# Patient Record
Sex: FEMALE | Race: WHITE | NOT HISPANIC OR LATINO | Employment: FULL TIME | URBAN - METROPOLITAN AREA
[De-identification: names, ages, dates, MRNs, and addresses within clinical notes are randomized per-mention and may not be internally consistent; named-entity substitution may affect disease eponyms.]

---

## 2017-03-13 ENCOUNTER — TELEPHONE (OUTPATIENT)
Dept: FAMILY MEDICINE | Facility: CLINIC | Age: 62
End: 2017-03-13

## 2017-03-13 NOTE — TELEPHONE ENCOUNTER
Dr. Frazier: Dr. Lenz is away until 3-21  What is needed for this patient from me (I am covering for her)?  Ninfa Morales MD

## 2017-03-13 NOTE — TELEPHONE ENCOUNTER
Patient is scheduled for a colonoscopy for 04/12/2017 with Dr. Do, patient is noting that she is needing to have orders for Mac Sedation for her anesthesia orders are needed. Thank you

## 2017-04-05 ENCOUNTER — OFFICE VISIT (OUTPATIENT)
Dept: FAMILY MEDICINE | Facility: CLINIC | Age: 62
End: 2017-04-05
Payer: COMMERCIAL

## 2017-04-05 VITALS
HEIGHT: 66 IN | SYSTOLIC BLOOD PRESSURE: 136 MMHG | BODY MASS INDEX: 22.02 KG/M2 | WEIGHT: 137 LBS | TEMPERATURE: 98 F | OXYGEN SATURATION: 94 % | HEART RATE: 92 BPM | DIASTOLIC BLOOD PRESSURE: 88 MMHG

## 2017-04-05 DIAGNOSIS — F33.0 MILD RECURRENT MAJOR DEPRESSION (H): ICD-10-CM

## 2017-04-05 DIAGNOSIS — J45.30 MILD PERSISTENT ASTHMA WITHOUT COMPLICATION: ICD-10-CM

## 2017-04-05 DIAGNOSIS — I10 HYPERTENSION GOAL BP (BLOOD PRESSURE) < 140/90: ICD-10-CM

## 2017-04-05 DIAGNOSIS — R19.5 POSITIVE FIT (FECAL IMMUNOCHEMICAL TEST): ICD-10-CM

## 2017-04-05 DIAGNOSIS — J33.9 NASAL POLYPOSIS: ICD-10-CM

## 2017-04-05 DIAGNOSIS — Z01.818 PREOP GENERAL PHYSICAL EXAM: Primary | ICD-10-CM

## 2017-04-05 LAB — HGB BLD-MCNC: 15.2 G/DL (ref 11.7–15.7)

## 2017-04-05 PROCEDURE — 85018 HEMOGLOBIN: CPT | Performed by: FAMILY MEDICINE

## 2017-04-05 PROCEDURE — 36415 COLL VENOUS BLD VENIPUNCTURE: CPT | Performed by: FAMILY MEDICINE

## 2017-04-05 PROCEDURE — 99214 OFFICE O/P EST MOD 30 MIN: CPT | Performed by: FAMILY MEDICINE

## 2017-04-05 PROCEDURE — 93000 ELECTROCARDIOGRAM COMPLETE: CPT | Performed by: FAMILY MEDICINE

## 2017-04-05 NOTE — LETTER
My Depression Action Plan  Name: Wendy Francis   Date of Birth 1955  Date: 4/5/2017    My doctor: Rosanna Lenz   My clinic: [address]  [place] [address]  [phone]          GREEN    ZONE   Good Control    What it looks like:     Things are going generally well. You have normal up s and down s. You may even feel depressed from time to time, but bad moods usually last less than a day.   What you need to do:  1. Continue to care for yourself (see self care plan)  2. Check your depression survival kit and update it as needed  3. Follow your physician s recommendations including any medication.  4. Do not stop taking medication unless you consult with your physician first.           YELLOW         ZONE Getting Worse    What it looks like:     Depression is starting to interfere with your life.     It may be hard to get out of bed; you may be starting to isolate yourself from others.    Symptoms of depression are starting to last most all day and this has happened for several days.     You may have suicidal thoughts but they are not constant.   What you need to do:     1. Call your care team, your response to treatment will improve if you keep your care team informed of your progress. Yellow periods are signs an adjustment may need to be made.     2. Continue your self-care, even if you have to fake it!    3. Talk to someone in your support network    4. Open up your depression survival kit           RED    ZONE Medical Alert - Get Help    What it looks like:     Depression is seriously interfering with your life.     You may experience these or other symptoms: You can t get out of bed most days, can t work or engage in other necessary activities, you have trouble taking care of basic hygiene, or basic responsibilities, thoughts of suicide or death that will not go away, self-injurious behavior.     What you need to do:  1. Call your care team and  request a same-day appointment. If they are not available (weekends or after hours) call your local crisis line, emergency room or 911.      Electronically signed by: Rosanna Lenz, April 5, 2017    Depression Self Care Plan / Survival Kit    Self-Care for Depression  Here s the deal. Your body and mind are really not as separate as most people think.  What you do and think affects how you feel and how you feel influences what you do and think. This means if you do things that people who feel good do, it will help you feel better.  Sometimes this is all it takes.  There is also a place for medication and therapy depending on how severe your depression is, so be sure to consult with your medical provider and/ or Behavioral Health Consultant if your symptoms are worsening or not improving.     In order to better manage my stress, I will:    Exercise  Get some form of exercise, every day. This will help reduce pain and release endorphins, the  feel good  chemicals in your brain. This is almost as good as taking antidepressants!  This is not the same as joining a gym and then never going! (they count on that by the way ) It can be as simple as just going for a walk or doing some gardening, anything that will get you moving.      Hygiene   Maintain good hygiene (Get out of bed in the morning, Make your bed, Brush your teeth, Take a shower, and Get dressed like you were going to work, even if you are unemployed).  If your clothes don't fit try to get ones that do.    Diet  I will strive to eat foods that are good for me, drink plenty of water, and avoid excessive sugar, caffeine, alcohol, and other mood-altering substances.  Some foods that are helpful in depression are: complex carbohydrates, B vitamins, flaxseed, fish or fish oil, fresh fruits and vegetables.    Psychotherapy  I agree to participate in Individual Therapy (if recommended).    Medication  If prescribed medications, I agree to take them.  Missing doses  can result in serious side effects.  I understand that drinking alcohol, or other illicit drug use, may cause potential side effects.  I will not stop my medication abruptly without first discussing it with my provider.    Staying Connected With Others  I will stay in touch with my friends, family members, and my primary care provider/team.    Use your imagination  Be creative.  We all have a creative side; it doesn t matter if it s oil painting, sand castles, or mud pies! This will also kick up the endorphins.    Witness Beauty  (AKA stop and smell the roses) Take a look outside, even in mid-winter. Notice colors, textures. Watch the squirrels and birds.     Service to others  Be of service to others.  There is always someone else in need.  By helping others we can  get out of ourselves  and remember the really important things.  This also provides opportunities for practicing all the other parts of the program.    Humor  Laugh and be silly!  Adjust your TV habits for less news and crime-drama and more comedy.    Control your stress  Try breathing deep, massage therapy, biofeedback, and meditation. Find time to relax each day.     My support system    Clinic Contact:  Phone number:    Contact 1:  Phone number:    Contact 2:  Phone number:    Latter day/:  Phone number:    Therapist:  Phone number:    Local crisis center:    Phone number:    Other community support:  Phone number:

## 2017-04-05 NOTE — NURSING NOTE
"Chief Complaint   Patient presents with     Pre-Op Exam       Initial /88 (BP Location: Left arm, Patient Position: Chair, Cuff Size: Adult Regular)  Pulse 92  Temp 98  F (36.7  C) (Oral)  Ht 5' 6\" (1.676 m)  Wt 137 lb (62.1 kg)  SpO2 94%  BMI 22.11 kg/m2 Estimated body mass index is 22.11 kg/(m^2) as calculated from the following:    Height as of this encounter: 5' 6\" (1.676 m).    Weight as of this encounter: 137 lb (62.1 kg).  Medication Reconciliation: complete    "

## 2017-04-05 NOTE — TELEPHONE ENCOUNTER
RN spoke with Velia with  compounding pharmacy and states they are request a refill on the Budesonide 2 mg Irrigation refills.  Medication and and pharmacy teed up.  Will route to MD for the refill request.    Josse ROCHA RN, BSN

## 2017-04-05 NOTE — TELEPHONE ENCOUNTER
Reason for call: med refill   Patient called regarding (reason for call): prescription-Budesonide 2 mg irrigation  Additional comments: Please call to discuss further    Phone Number Pt can be reached at: [phone]  Best Time: anytime before 4  Can we leave a detailed message on this number? YES

## 2017-04-05 NOTE — PROGRESS NOTES
[facility]  [address] [place] [id number]  [phone]  Dept: [phone]    PRE-OP EVALUATION:  Today's date: 2017    Wendy Francis (: 1955) presents for pre-operative evaluation assessment as requested by Dr. Do.  She requires evaluation and anesthesia risk assessment prior to undergoing surgery/procedure for treatment of occult blood in stool.  Proposed procedure: Colonoscopy    Date of Surgery/ Procedure: 2017  Time of Surgery/ Procedure: 11:30 am  Hospital/Surgical Facility: [facility]  Primary Physician: Rosanna Lenz  Type of Anesthesia Anticipated: to be determined    Patient has a Health Care Directive or Living Will:  NO    Preop Questions 4/3/2017   1.  Do you have a history of heart attack, stroke, stent, bypass or surgery on an artery in the head, neck, heart or legs? No   2.  Do you ever have any pain or discomfort in your chest? No   3.  Do you have a history of  Heart Failure? No   4.   Are you troubled by shortness of breath when:  walking on a level surface, or up a slight hill, or at night? No   5.  Do you currently have a cold, bronchitis or other respiratory infection? No   6.  Do you have a cough, shortness of breath, or wheezing? No   7.  Do you sometimes get pains in the calves of your legs when you walk? No   8. Do you or anyone in your family have previous history of blood clots? No   9.  Do you or does anyone in your family have a serious bleeding problem such as prolonged bleeding following surgeries or cuts? No   10. Have you ever had problems with anemia or been told to take iron pills? No   11. Have you had any abnormal blood loss such as black, tarry or bloody stools, or abnormal vaginal bleeding? No   12. Have you ever had a blood transfusion? No   13. Have you or any of your relatives ever had problems with anesthesia? No   14. Do you have sleep apnea, excessive snoring or daytime drowsiness? No   15. Do you have any prosthetic  heart valves? No   16. Do you have prosthetic joints? No   17. Is there any chance that you may be pregnant? No     HPI:                                                    Wendy MELISSA Shantonioien is a 61 year old female who presents with positive occult blood in stool by FIT card. She denies symptoms.     She has been feeling depressed for 2 months.     See problem list for active medical problems.  Problems all longstanding and stable, except as noted/documented.  See ROS for pertinent symptoms related to these conditions.                                                                                                  .    MEDICAL HISTORY:                                                      Patient Active Problem List    Diagnosis Date Noted     Hypertension goal BP (blood pressure) < 140/90      Priority: High     Mild persistent asthma      Priority: High     Mild recurrent major depression (H)      Priority: Medium     Positive FIT (fecal immunochemical test) 12/15/2016     Priority: Medium     Nasal polyposis 02/07/2013     Priority: Medium     Dr. Zhang ENT       History of cervical cancer 09/23/2010     Priority: Medium     2000       Overactive bladder      Priority: Medium     Advanced directives, counseling/discussion 08/18/2011     Priority: Low     Advanced directive materials discussed and given to patient.         Allergic rhinitis      Priority: Low     molds, dust; claritin ineffective       CARDIOVASCULAR SCREENING; LDL GOAL LESS THAN 160 10/31/2010     Priority: Low      Past Medical History:   Diagnosis Date     Allergic rhinitis     molds, dust     Atrophic vaginitis      Cervical cancer (H) 4/10/2000    chemo and radiation, IB-II squamous cell, positive nodes     Diverticulosis of colon 6/2004     Hypertension goal BP (blood pressure) < 140/90      Mild persistent asthma      Mild recurrent major depression (H)      Nasal polyps      Overactive bladder      Sensorineural hearing losses       Tinnitus      Varicose vein of leg     No surgery     Past Surgical History:   Procedure Laterality Date     COLPOSCOPY,LOOP ELECTRD CERVIX EXCIS  2000    Radiation & chemotherapy for cervical cancer. Diagnosed in 2000     HC CORRECT BUNION,SIMPLE  over 10 years ago    ESTEPHANIE     SINUS SURGERY      x 2, Nasal polyps removed     Current Outpatient Prescriptions   Medication Sig Dispense Refill     tolterodine (DETROL LA) 4 MG 24 hr capsule Take 1 capsule (4 mg) by mouth daily 90 capsule 3     budesonide-formoterol (SYMBICORT) 80-4.5 MCG/ACT inhaler USE 2 INHALATIONS TWICE A DAY 3 Inhaler 1     COMPOUND (CMPD RX) - PHARMACY TO MIX COMPOUNDED MEDICATION Budesonide 2 mg/L irrigation solution. Rinse with 20 mls in each nostril two times a day 2000 mL 11     albuterol (PROAIR HFA, PROVENTIL HFA, VENTOLIN HFA) 108 (90 BASE) MCG/ACT inhaler Inhale 2 puffs into the lungs every 4 hours as needed for shortness of breath / dyspnea 1 Inhaler 5     cetirizine (ZYRTEC) 10 MG tablet Take 1 tablet by mouth daily as needed for allergies.       Omega-3 Fatty Acids (FISH OIL) 500 MG CAPS Take  by mouth. 1 daily         MULTI-VITAMIN OR DAILY       OTC products: None, except as noted above    Allergies   Allergen Reactions     Citalopram      Thinking changes     Flonase [Fluticasone Propionate]      Sulfa Drugs Rash      Latex Allergy: NO    Social History   Substance Use Topics     Smoking status: Never Smoker     Smokeless tobacco: Never Used     Alcohol use 1.0 oz/week      Comment: 0-2 drinks weekly     History   Drug Use No     Social History     Social History     Marital status:      Spouse name: Gerry     Number of children: 0     Years of education: 14     Occupational History     office work Other     [employer]     Social History Main Topics     Smoking status: Never Smoker     Smokeless tobacco: Never Used     Alcohol use 1.0 oz/week      Comment: 0-2 drinks weekly     Drug use: No     Sexual activity: Not Currently      Partners: Male     Birth control/ protection: Post-menopausal     Other Topics Concern      Service No     Blood Transfusions No     Caffeine Concern No     Occupational Exposure No     Hobby Hazards No     Sleep Concern No     Stress Concern Yes     marital stress, as patient is not sexually active.     Weight Concern No     Special Diet No     Back Care No     Exercise Yes     gym 3-4 x week, aerobics, weight machines.     Bike Helmet No     she will purchase one.     Seat Belt Yes     Self-Exams Yes     occasional     Parent/Sibling W/ Cabg, Mi Or Angioplasty Before 65f 55m? No     Social History Narrative    She and her  have been taking lessons for dancing.    Hobby of dancing, go to [place] to dance.          Family History   Problem Relation Age of Onset     CANCER Maternal Grandmother      pelvic     Neurologic Disorder Sister      brain aneurysm     Hypertension Father      Asthma Father      Pacemaker Father      DIABETES Maternal Grandfather      Allergies Brother      CEREBROVASCULAR DISEASE Sister      C.A.D. No family hx of       REVIEW OF SYSTEMS:                                                    C: NEGATIVE for fever, chills, change in weight  I: NEGATIVE for worrisome rashes, moles or lesions  E: NEGATIVE for vision changes or irritation  E/M: NEGATIVE for ear, mouth and throat problems  R: NEGATIVE for significant cough or SOB  B: NEGATIVE for masses, tenderness or discharge  CV: NEGATIVE for chest pain, palpitations or peripheral edema  GI: NEGATIVE for nausea, abdominal pain, heartburn, or change in bowel habits  : NEGATIVE for frequency, dysuria, or hematuria  M: NEGATIVE for significant arthralgias or myalgia  N: NEGATIVE for weakness, dizziness or paresthesias  E: NEGATIVE for temperature intolerance, skin/hair changes  H: NEGATIVE for bleeding problems  PSYCHIATRIC: depression   Complete ROS otherwise negative.     EXAM:                                                   "  /88 (BP Location: Left arm, Patient Position: Chair, Cuff Size: Adult Regular)  Pulse 92  Temp 98  F (36.7  C) (Oral)  Ht 5' 6\" (1.676 m)  Wt 137 lb (62.1 kg)  SpO2 94%  BMI 22.11 kg/m2    GENERAL APPEARANCE: healthy, alert and no distress     EYES: EOMI, PERRL     HENT: ear canals and TM's normal and nose and mouth without ulcers or lesions     NECK: no adenopathy, no asymmetry, masses, or scars and thyroid normal to palpation     RESP: lungs clear to auscultation - no rales, rhonchi or wheezes     CV: regular rates and rhythm, normal S1 S2, no S3 or S4 and no murmur, click or rub     ABDOMEN:  soft, nontender, no HSM or masses and bowel sounds normal     MS: extremities normal- no gross deformities noted, no evidence of inflammation in joints, FROM in all extremities.     SKIN: no suspicious lesions or rashes; multiple seborrheic keratoses and cherry hemangiomas; a few excoriations on back     NEURO: Normal strength and tone, sensory exam grossly normal, mentation intact and speech normal     PSYCH: mentation appears normal. and affect normal/bright     LYMPHATICS: No axillary, cervical, or supraclavicular nodes    DIAGNOSTICS:                                                    Normal EKG     IMPRESSION:                                                    Reason for surgery/procedure: occult blood in stool   Diagnosis/reason for consult: hypertension, asthma     The proposed surgical procedure is considered LOW risk.    REVISED CARDIAC RISK INDEX  The patient has the following serious cardiovascular risks for perioperative complications such as (MI, PE, VFib and 3  AV Block):  No serious cardiac risks  INTERPRETATION: 0 risks: Class I (very low risk - 0.4% complication rate)    The patient has the following additional risks for perioperative complications:  No identified additional risks      ICD-10-CM    1. Preop general physical exam Z01.818 EKG 12-lead complete w/read - Clinics     Hemoglobin     " JUST IN CASE   2. Positive FIT (fecal immunochemical test) R19.5 Hemoglobin     JUST IN CASE   3. Mild recurrent major depression (H) F33.0    4. Hypertension goal BP (blood pressure) < 140/90 I10    5. Mild persistent asthma without complication J45.30      RECOMMENDATIONS:                                                    --Patient is to hold all scheduled medications on the day of surgery    Follow-up with me about depression.     APPROVAL GIVEN to proceed with proposed procedure, without further diagnostic evaluation       Signed Electronically by: Rosanna Lenz MD    Copy of this evaluation report is provided to requesting physician.    [facility] Preop Guidelines

## 2017-04-05 NOTE — TELEPHONE ENCOUNTER
Signed Prescriptions:                        Disp   Refills    COMPOUND (CMPD RX) - PHARMACY TO MIX COMPO*2000 mL11       Sig: Budesonide 2 mg/L irrigation solution. Rinse with 20           mls in each nostril two times a day  Authorizing Provider: CASSANDRA LA

## 2017-04-05 NOTE — PATIENT INSTRUCTIONS
Before Your Surgery      Call your surgeon if there is any change in your health. This includes signs of a cold or flu (such as a sore throat, runny nose, cough, rash or fever).    Do not smoke, drink alcohol or take over the counter medicine (unless your surgeon or primary care doctor tells you to) for the 24 hours before and after surgery.    If you take prescribed drugs: Follow your doctor s orders about which medicines to take and which to stop until after surgery.    Eating and drinking prior to surgery: follow the instructions from your surgeon    Take a shower or bath the night before surgery. Use the soap your surgeon gave you to gently clean your skin. If you do not have soap from your surgeon, use your regular soap. Do not shave or scrub the surgery site.  Wear clean pajamas and have clean sheets on your bed.       [facility]    If you have any questions regarding to your visit please contact your care team:       Team Red:   Clinic Hours Telephone Number   Dr. Rosanna Newby NP   7am-7pm  Monday - Thursday   7am-5pm  Fridays  [phone]  (Appointment scheduling available 24/7)    Questions about your visit?   Team Line  [phone]   Urgent Care - [place] - 11am-9pm Monday-Friday Saturday-Sunday- 9am-5pm   [place] - 5pm-9pm Monday-Friday Saturday-Sunday- 9am-5pm  [phone] - [place]   [phone] - [place]       What options do I have for visits at the clinic other than the traditional office visit?  To expand how we care for you, many of our providers are utilizing electronic visits (e-visits) and telephone visits, when medically appropriate, for interactions with their patients rather than a visit in the clinic.   We also offer nurse visits for many medical concerns. Just like any other service, we will bill your insurance company for this type of visit based on time spent on the phone with  your provider. Not all insurance companies cover these visits. Please check with your medical insurance if this type of visit is covered. You will be responsible for any charges that are not paid by your insurance.      E-visits via [employer]:  generally incur a $35.00 fee.  Telephone visits:  Time spent on the phone: *charged based on time that is spent on the phone in increments of 10 minutes. Estimated cost:   5-10 mins $30.00   11-20 mins. $59.00   21-30 mins. $85.00     Use [employer] (secure email communication and access to your chart) to send your primary care provider a message or make an appointment. Ask someone on your Team how to sign up for [employer].  For a Price Quote for your services, please call our Consumer Price Line at [phone].      As always, Thank you for trusting us with your health care needs!      Discharge TULIO Olsen  CMA

## 2017-04-05 NOTE — MR AVS SNAPSHOT
After Visit Summary   4/5/2017    Wendy Francis    MRN: [id number]           Patient Information     Date Of Birth          1955        Visit Information        Provider Department      4/5/2017 7:00 AM Rosanna Lenz MD [facility]        Today's Diagnoses     Preop general physical exam    -  1    Positive FIT (fecal immunochemical test)        Mild recurrent major depression (H)        Hypertension goal BP (blood pressure) < 140/90        Mild persistent asthma without complication          Care Instructions      Before Your Surgery      Call your surgeon if there is any change in your health. This includes signs of a cold or flu (such as a sore throat, runny nose, cough, rash or fever).    Do not smoke, drink alcohol or take over the counter medicine (unless your surgeon or primary care doctor tells you to) for the 24 hours before and after surgery.    If you take prescribed drugs: Follow your doctor s orders about which medicines to take and which to stop until after surgery.    Eating and drinking prior to surgery: follow the instructions from your surgeon    Take a shower or bath the night before surgery. Use the soap your surgeon gave you to gently clean your skin. If you do not have soap from your surgeon, use your regular soap. Do not shave or scrub the surgery site.  Wear clean pajamas and have clean sheets on your bed.       [facility]    If you have any questions regarding to your visit please contact your care team:       Team Red:   Clinic Hours Telephone Number   Dr. Rosanna Newby, NP   7am-7pm  Monday - Thursday   7am-5pm  Fridays  [phone]  (Appointment scheduling available 24/7)    Questions about your visit?   Team Line  [phone]   Urgent Care - [place] and [facility] [place] - 11am-9pm Monday-Friday Saturday-Sunday- 9am-5pm   [place] - 5pm-9pm Monday-Friday Saturday-Sunday-  9am-5pm  [phone] - [place]   [phone] - [place]       What options do I have for visits at the clinic other than the traditional office visit?  To expand how we care for you, many of our providers are utilizing electronic visits (e-visits) and telephone visits, when medically appropriate, for interactions with their patients rather than a visit in the clinic.   We also offer nurse visits for many medical concerns. Just like any other service, we will bill your insurance company for this type of visit based on time spent on the phone with your provider. Not all insurance companies cover these visits. Please check with your medical insurance if this type of visit is covered. You will be responsible for any charges that are not paid by your insurance.      E-visits via Sawer[employer]:  generally incur a $35.00 fee.  Telephone visits:  Time spent on the phone: *charged based on time that is spent on the phone in increments of 10 minutes. Estimated cost:   5-10 mins $30.00   11-20 mins. $59.00   21-30 mins. $85.00     Use Sawerly (secure email communication and access to your chart) to send your primary care provider a message or make an appointment. Ask someone on your Team how to sign up for [employer]er[employer].  For a Price Quote for your services, please call our Consumer Price Line at [phone].      As always, Thank you for trusting us with your health care needs!      Discharge TULIO Olsen  [facility]          Follow-ups after your visit        Follow-up notes from your care team     Return in about 6 months (around 10/5/2017) for physical, asthma, depression.      Your next 10 appointments already scheduled     Apr 12, 2017   Procedure with Anatoly Do MD   [facility] (--)    [address]  [facility] [address] [phone]              Who to contact     If you have questions or need follow up information about today's clinic visit or your schedule please contact [facility] directly at [phone].  Normal or non-critical lab and imaging results will be communicated to you by MyChart, letter or phone within 4 business days after the clinic has received the results. If you do not hear from us within 7 days, please contact the clinic through [employer] or phone. If you have a critical or abnormal lab result, we will notify you by phone as soon as possible.  Submit refill requests through [employer] or call your pharmacy and they will forward the refill request to us. Please allow 3 business days for your refill to be completed.          Additional Information About Your Visit        [employer] Information     [employer] gives you secure access to your electronic health record. If you see a primary care provider, you can also send messages to your care team and make appointments. If you have questions, please call your primary care clinic.  If you do not have a primary care provider, please call [phone] and they will assist you.        Care EveryWhere ID     This is your Care EveryWhere ID. This could be used by other organizations to access your [facility] medical records  [id number]        Your Vitals Were     Pulse Temperature Height Pulse Oximetry BMI (Body Mass Index)       92 98  F (36.7  C) (Oral) 5' 6\" (1.676 m) 94% 22.11 kg/m2        Blood Pressure from Last 3 Encounters:   04/05/17 136/88   11/10/16 (!) 154/100   11/07/16 140/90    Weight from Last 3 Encounters:   04/05/17 137 lb (62.1 kg)   01/09/17 135 lb (61.2 kg)   11/10/16 134 lb 9.6 oz (61.1 kg)              We Performed the Following     EKG 12-lead complete w/read - Clinics     Hemoglobin     JUST IN CASE        Primary Care Provider Office Phone # Fax #    Rosanna Lenz [phone]       [facility] [address] [facility] [address]        Thank you!     Thank you for choosing [facility]  for your care. Our goal is always to provide you with excellent care. " Hearing back from our patients is one way we can continue to improve our services. Please take a few minutes to complete the written survey that you may receive in the mail after your visit with us. Thank you!             Your Updated Medication List - Protect others around you: Learn how to safely use, store and throw away your medicines at www.disposemymeds.org.          This list is accurate as of: 4/5/17  7:44 AM.  Always use your most recent med list.                   Brand Name Dispense Instructions for use    albuterol 108 (90 BASE) MCG/ACT Inhaler    PROAIR HFA/PROVENTIL HFA/VENTOLIN HFA    1 Inhaler    Inhale 2 puffs into the lungs every 4 hours as needed for shortness of breath / dyspnea       budesonide-formoterol 80-4.5 MCG/ACT Inhaler    SYMBICORT    3 Inhaler    USE 2 INHALATIONS TWICE A DAY       cetirizine 10 MG tablet    zyrTEC     Take 1 tablet by mouth daily as needed for allergies.       COMPOUND - PHARMACY TO MIX COMPOUNDED MEDICATION    CMPD RX    2000 mL    Budesonide 2 mg/L irrigation solution. Rinse with 20 mls in each nostril two times a day       Fish Oil 500 MG Caps      Take  by mouth. 1 daily       MULTI-VITAMIN PO      DAILY       tolterodine 4 MG 24 hr capsule    DETROL LA    90 capsule    Take 1 capsule (4 mg) by mouth daily

## 2017-04-11 ENCOUNTER — TELEPHONE (OUTPATIENT)
Dept: FAMILY MEDICINE | Facility: CLINIC | Age: 62
End: 2017-04-11

## 2017-04-11 NOTE — TELEPHONE ENCOUNTER
Called and left message to call red team.    Rae Huff CMA ([facility])

## 2017-04-11 NOTE — TELEPHONE ENCOUNTER
Ele was in to see Dr. Lenz on 04-05-17 and has the diagnosis of Depression and was due for a PHQ-9, please complete.

## 2017-04-12 ENCOUNTER — HOSPITAL ENCOUNTER (OUTPATIENT)
Facility: AMBULATORY SURGERY CENTER | Age: 62
Discharge: HOME | End: 2017-04-12
Attending: SURGERY | Admitting: SURGERY
Payer: COMMERCIAL

## 2017-04-12 ENCOUNTER — SURGERY (OUTPATIENT)
Age: 62
End: 2017-04-12

## 2017-04-12 ENCOUNTER — ANESTHESIA (OUTPATIENT)
Dept: SURGERY | Facility: AMBULATORY SURGERY CENTER | Age: 62
End: 2017-04-12

## 2017-04-12 ENCOUNTER — ANESTHESIA EVENT (OUTPATIENT)
Dept: SURGERY | Facility: AMBULATORY SURGERY CENTER | Age: 62
End: 2017-04-12

## 2017-04-12 VITALS — RESPIRATION RATE: 17 BRPM

## 2017-04-12 VITALS
OXYGEN SATURATION: 96 % | RESPIRATION RATE: 18 BRPM | TEMPERATURE: 97.4 F | SYSTOLIC BLOOD PRESSURE: 153 MMHG | DIASTOLIC BLOOD PRESSURE: 78 MMHG

## 2017-04-12 DIAGNOSIS — R19.5 HEME POSITIVE STOOL: Primary | ICD-10-CM

## 2017-04-12 LAB — COLONOSCOPY: NORMAL

## 2017-04-12 PROCEDURE — G8918 PT W/O PREOP ORDER IV AB PRO: HCPCS

## 2017-04-12 PROCEDURE — 45378 DIAGNOSTIC COLONOSCOPY: CPT | Mod: 74

## 2017-04-12 PROCEDURE — 45378 DIAGNOSTIC COLONOSCOPY: CPT | Mod: 53 | Performed by: SURGERY

## 2017-04-12 PROCEDURE — G8907 PT DOC NO EVENTS ON DISCHARG: HCPCS

## 2017-04-12 RX ORDER — NALOXONE HYDROCHLORIDE 0.4 MG/ML
.1-.4 INJECTION, SOLUTION INTRAMUSCULAR; INTRAVENOUS; SUBCUTANEOUS
Status: DISCONTINUED | OUTPATIENT
Start: 2017-04-12 | End: 2017-04-13 | Stop reason: HOSPADM

## 2017-04-12 RX ORDER — FENTANYL CITRATE 50 UG/ML
25-50 INJECTION, SOLUTION INTRAMUSCULAR; INTRAVENOUS
Status: DISCONTINUED | OUTPATIENT
Start: 2017-04-12 | End: 2017-04-13 | Stop reason: HOSPADM

## 2017-04-12 RX ORDER — ONDANSETRON 4 MG/1
4 TABLET, ORALLY DISINTEGRATING ORAL EVERY 30 MIN PRN
Status: DISCONTINUED | OUTPATIENT
Start: 2017-04-12 | End: 2017-04-13 | Stop reason: HOSPADM

## 2017-04-12 RX ORDER — PROPOFOL 10 MG/ML
INJECTION, EMULSION INTRAVENOUS PRN
Status: DISCONTINUED | OUTPATIENT
Start: 2017-04-12 | End: 2017-04-12

## 2017-04-12 RX ORDER — ONDANSETRON 2 MG/ML
4 INJECTION INTRAMUSCULAR; INTRAVENOUS EVERY 30 MIN PRN
Status: DISCONTINUED | OUTPATIENT
Start: 2017-04-12 | End: 2017-04-13 | Stop reason: HOSPADM

## 2017-04-12 RX ORDER — SODIUM CHLORIDE, SODIUM LACTATE, POTASSIUM CHLORIDE, CALCIUM CHLORIDE 600; 310; 30; 20 MG/100ML; MG/100ML; MG/100ML; MG/100ML
INJECTION, SOLUTION INTRAVENOUS CONTINUOUS
Status: DISCONTINUED | OUTPATIENT
Start: 2017-04-12 | End: 2017-04-13 | Stop reason: HOSPADM

## 2017-04-12 RX ORDER — LIDOCAINE 40 MG/G
CREAM TOPICAL
Status: DISCONTINUED | OUTPATIENT
Start: 2017-04-12 | End: 2017-04-13 | Stop reason: HOSPADM

## 2017-04-12 RX ORDER — LIDOCAINE HYDROCHLORIDE 20 MG/ML
INJECTION, SOLUTION INFILTRATION; PERINEURAL PRN
Status: DISCONTINUED | OUTPATIENT
Start: 2017-04-12 | End: 2017-04-12

## 2017-04-12 RX ORDER — ONDANSETRON 2 MG/ML
INJECTION INTRAMUSCULAR; INTRAVENOUS PRN
Status: DISCONTINUED | OUTPATIENT
Start: 2017-04-12 | End: 2017-04-12

## 2017-04-12 RX ORDER — PROPOFOL 10 MG/ML
INJECTION, EMULSION INTRAVENOUS CONTINUOUS PRN
Status: DISCONTINUED | OUTPATIENT
Start: 2017-04-12 | End: 2017-04-12

## 2017-04-12 RX ADMIN — PROPOFOL 50 MG: 10 INJECTION, EMULSION INTRAVENOUS at 11:54

## 2017-04-12 RX ADMIN — SODIUM CHLORIDE, SODIUM LACTATE, POTASSIUM CHLORIDE, CALCIUM CHLORIDE: 600; 310; 30; 20 INJECTION, SOLUTION INTRAVENOUS at 10:46

## 2017-04-12 RX ADMIN — ONDANSETRON 4 MG: 2 INJECTION INTRAMUSCULAR; INTRAVENOUS at 11:59

## 2017-04-12 RX ADMIN — PROPOFOL 50 MG: 10 INJECTION, EMULSION INTRAVENOUS at 11:53

## 2017-04-12 RX ADMIN — SODIUM CHLORIDE, SODIUM LACTATE, POTASSIUM CHLORIDE, CALCIUM CHLORIDE: 600; 310; 30; 20 INJECTION, SOLUTION INTRAVENOUS at 11:47

## 2017-04-12 RX ADMIN — PROPOFOL 100 MCG/KG/MIN: 10 INJECTION, EMULSION INTRAVENOUS at 11:51

## 2017-04-12 RX ADMIN — LIDOCAINE HYDROCHLORIDE 40 MG: 20 INJECTION, SOLUTION INFILTRATION; PERINEURAL at 11:51

## 2017-04-12 NOTE — ANESTHESIA CARE TRANSFER NOTE
Patient: Wendy Francis    Procedure(s):  colonoscopy with MAC - Wound Class: II-Clean Contaminated    Diagnosis: screening  Diagnosis Additional Information: No value filed.    Anesthesia Type:   MAC     Note:    Patient transferred to:Phase II        Vitals: (Last set prior to Anesthesia Care Transfer)    CRNA VITALS  4/12/2017 1136 - 4/12/2017 1208      4/12/2017             Pulse: 99    SpO2: 97 %                Electronically Signed By: CAMILO Hayden CRNA  April 12, 2017  12:08 PM

## 2017-04-12 NOTE — ANESTHESIA PREPROCEDURE EVALUATION
Anesthesia Evaluation     . Pt has had prior anesthetic. Type: General    No history of anesthetic complications          ROS/MED HX    ENT/Pulmonary:     (+)Mild Persistent asthma Treatment: Inhaler daily,  , . .    Neurologic:  - neg neurologic ROS     Cardiovascular:     (+) hypertension----. : . . . :. .       METS/Exercise Tolerance:     Hematologic:  - neg hematologic  ROS       Musculoskeletal:  - neg musculoskeletal ROS       GI/Hepatic:  - neg GI/hepatic ROS       Renal/Genitourinary:     (+) Other Renal/ Genitourinary, Overactive bladder      Endo:  - neg endo ROS       Psychiatric:     (+) psychiatric history depression and anxiety      Infectious Disease:  - neg infectious disease ROS       Malignancy:   (+) Malignancy History of Other  Other CA Cervical Remission status post         Other:    - neg other ROS                 Physical Exam  Normal systems: cardiovascular, pulmonary and dental    Airway   Mallampati: I  TM distance: >3 FB  Neck ROM: full    Dental     Cardiovascular       Pulmonary    breath sounds clear to auscultation                    Anesthesia Plan      History & Physical Review  History and physical reviewed and following examination; no interval change.    ASA Status:  3 .    NPO Status:  > 6 hours    Plan for MAC with Intravenous and Propofol induction. Maintenance will be TIVA.  Reason for MAC:  Extreme anxiety (QS)  PONV prophylaxis:  Ondansetron (or other 5HT-3)       Postoperative Care  Postoperative pain management:  Multi-modal analgesia.      Consents  Anesthetic plan, risks, benefits and alternatives discussed with:  Patient and Spouse..                          .

## 2017-04-12 NOTE — IP AVS SNAPSHOT
MRN:[id number]                      After Visit Summary   4/12/2017    Wendy Francis    MRN: [id number]           Thank you!     Thank you for choosing [facility] for your care. Our goal is always to provide you with excellent care. Hearing back from our patients is one way we can continue to improve our services. Please take a few minutes to complete the written survey that you may receive in the mail after you visit with us. Thank you!        Patient Information     Date Of Birth          1955        About your hospital stay     You were admitted on:  April 12, 2017 You last received care in the:  [facility]    You were discharged on:  April 12, 2017       Who to Call     For medical emergencies, please call 911.  For non-urgent questions about your medical care, please call your primary care provider or clinic, [phone]  For questions related to your surgery, please call your surgery clinic        Attending Provider     Provider Specialty    Anatoly Do MD Surgery       Primary Care Provider Office Phone # Fax #    Rosanna Lenz [phone]       [address] [address]        Your next 10 appointments already scheduled     Apr 13, 2017  9:00 AM CDT   XR COLON AIR CONTRAST with MGXR1, MG NM RAD   [facility]    [address] [place] [address] [phone]           Your exam will take about one hour. If you think you might be pregnant, tell your doctor before your exam.  Your bowel (insides) must be empty to get a clear picture. Follow these guidelines:   The night before your exam:Take 10 ounces magnesium citrate at 6 p.m. the night before your exam.   Take 3 Dulcolax tablets at 8 p.m. Swallow the tablets whole, do not chew or crush them. Do not take within 1 hour of antacids.   1 Dulcolax rectal suppository. Take this the morning  of your exam if your stools are not clear by this time. If your stools are clear, you don t need to take this. Do not use a suppository if you are having an air contrast colon.   Follow a  non-residue diet  for at least 48 hours. This means no fruits, vegetables, nuts, seeds or whole grains.   Do not eat, chew gum or smoke for 8 hours before your exam.   Keep drinking clear liquids until 2 hours before your exam. Clear liquids include water, clear juice, black coffee or clear tea without milk, Gatorade, clear soda.   Please bring a list of your current medicines to your exam. (Include vitamins, minerals and over-the-counter medicines.) Leave your valuables at home.  Please call the Imaging Department at your exam site with any questions.              Future tests that were ordered for you     XR Colon Air contrast       Only able to get to 40 cm on colonoscopy                  Further instructions from your care team        I did notice that your anus muscle was weak.    Do you, or anyone you know, have any trouble with leaking of stool?  With passing gas or sudden urge to have a bowel movement?  Or not being aware that you are passing stool with no warning or while you are asleep?  If so we have a questionnaire to have you fill out and then follow up with me in the clinic to discuss a great new option that has a 90 % success rate and you get to see if it works for you first before having any permanent procedure done.    Call [phone] get an appointment with me.   There is hope.  Thanks  Anatoly Do MD      Pending Results     No orders found from 4/10/2017 to 4/13/2017.            Admission Information     Date & Time Provider Department Dept. Phone    4/12/2017 Anatoly Do MD [facility] [phone]      Your Vitals Were     Blood Pressure Temperature Respirations Pulse Oximetry          132/68 97.4  F (36.3  C) (Temporal) 16 96%        MyChart Information     MyChart  gives you secure access to your electronic health record. If you see a primary care provider, you can also send messages to your care team and make appointments. If you have questions, please call your primary care clinic.  If you do not have a primary care provider, please call [phone] and they will assist you.        Care EveryWhere ID     This is your Care EveryWhere ID. This could be used by other organizations to access your [facility] medical records  [id number]           Review of your medicines      CONTINUE these medicines which have NOT CHANGED        Dose / Directions    albuterol 108 (90 BASE) MCG/ACT Inhaler   Commonly known as:  PROAIR HFA/PROVENTIL HFA/VENTOLIN HFA   Used for:  Mild persistent asthma without complication        Dose:  2 puff   Inhale 2 puffs into the lungs every 4 hours as needed for shortness of breath / dyspnea   Quantity:  1 Inhaler   Refills:  5       budesonide-formoterol 80-4.5 MCG/ACT Inhaler   Commonly known as:  SYMBICORT   Used for:  Mild persistent asthma without complication        USE 2 INHALATIONS TWICE A DAY   Quantity:  3 Inhaler   Refills:  1       cetirizine 10 MG tablet   Commonly known as:  zyrTEC        Dose:  10 mg   Take 1 tablet by mouth daily as needed for allergies.   Refills:  0       COMPOUND - PHARMACY TO MIX COMPOUNDED MEDICATION   Commonly known as:  CMPD RX   Used for:  Nasal polyposis        Budesonide 2 mg/L irrigation solution. Rinse with 20 mls in each nostril two times a day   Quantity:  2000 mL   Refills:  11       Fish Oil 500 MG Caps        Take  by mouth. 1 daily   Refills:  0       MULTI-VITAMIN PO        DAILY   Refills:  0       tolterodine 4 MG 24 hr capsule   Commonly known as:  DETROL LA   Used for:  Overactive bladder        Dose:  4 mg   Take 1 capsule (4 mg) by mouth daily   Quantity:  90 capsule   Refills:  3                Protect others around you: Learn how to safely use, store and throw away your medicines at  www.disposemymeds.org.             Medication List: This is a list of all your medications and when to take them. Check marks below indicate your daily home schedule. Keep this list as a reference.      Medications           Morning Afternoon Evening Bedtime As Needed    albuterol 108 (90 BASE) MCG/ACT Inhaler   Commonly known as:  PROAIR HFA/PROVENTIL HFA/VENTOLIN HFA   Inhale 2 puffs into the lungs every 4 hours as needed for shortness of breath / dyspnea                                budesonide-formoterol 80-4.5 MCG/ACT Inhaler   Commonly known as:  SYMBICORT   USE 2 INHALATIONS TWICE A DAY                                cetirizine 10 MG tablet   Commonly known as:  zyrTEC   Take 1 tablet by mouth daily as needed for allergies.                                COMPOUND - PHARMACY TO MIX COMPOUNDED MEDICATION   Commonly known as:  CMPD RX   Budesonide 2 mg/L irrigation solution. Rinse with 20 mls in each nostril two times a day                                Fish Oil 500 MG Caps   Take  by mouth. 1 daily                                MULTI-VITAMIN PO   DAILY                                tolterodine 4 MG 24 hr capsule   Commonly known as:  DETROL LA   Take 1 capsule (4 mg) by mouth daily

## 2017-04-12 NOTE — DISCHARGE INSTRUCTIONS
I did notice that your anus muscle was weak.    Do you, or anyone you know, have any trouble with leaking of stool?  With passing gas or sudden urge to have a bowel movement?  Or not being aware that you are passing stool with no warning or while you are asleep?  If so we have a questionnaire to have you fill out and then follow up with me in the clinic to discuss a great new option that has a 90 % success rate and you get to see if it works for you first before having any permanent procedure done.    Call [phone] get an appointment with me.   There is hope.  Thanks  Anatoly Do MD

## 2017-04-12 NOTE — ANESTHESIA POSTPROCEDURE EVALUATION
Patient: Wendy Francis    Procedure(s):  colonoscopy with MAC - Wound Class: II-Clean Contaminated    Diagnosis:screening  Diagnosis Additional Information: No value filed.    Anesthesia Type:  MAC    Note:  Anesthesia Post Evaluation    Patient location during evaluation: PACU  Patient participation: Able to fully participate in evaluation  Level of consciousness: awake  Pain management: adequate  Airway patency: patent  Cardiovascular status: acceptable  Respiratory status: acceptable  Hydration status: acceptable  PONV: none     Anesthetic complications: None    Comments: Excellent recovery noted.        Last vitals:  Vitals:    04/12/17 1038 04/12/17 1205   BP: 154/75 132/68   Resp: 18 16   Temp: 97.4  F (36.3  C)    SpO2: 95% 96%         Electronically Signed By: Adolfo Randall MD  April 12, 2017  12:14 PM

## 2017-04-12 NOTE — IP AVS SNAPSHOT
[facility]    [address] [place] [address]    Phone:  [phone]                                       After Visit Summary   4/12/2017    Wendy Francis    MRN: [id number]           After Visit Summary Signature Page     I have received my discharge instructions, and my questions have been answered. I have discussed any challenges I see with this plan with the nurse or doctor.    ..........................................................................................................................................  Patient/Patient Representative Signature      ..........................................................................................................................................  Patient Representative Print Name and Relationship to Patient    ..................................................               ................................................  Date                                            Time    ..........................................................................................................................................  Reviewed by Signature/Title    ...................................................              ..............................................  Date                                                            Time

## 2017-04-13 ENCOUNTER — RADIANT APPOINTMENT (OUTPATIENT)
Dept: GENERAL RADIOLOGY | Facility: CLINIC | Age: 62
End: 2017-04-13
Attending: SURGERY
Payer: COMMERCIAL

## 2017-04-13 DIAGNOSIS — R19.5 HEME POSITIVE STOOL: ICD-10-CM

## 2017-04-13 PROCEDURE — 74270 X-RAY XM COLON 1CNTRST STD: CPT

## 2017-04-14 NOTE — TELEPHONE ENCOUNTER
Spoke to patient let her know that Dr Lenz want patient to do PHQ 9 on my chart, send to patient    Jodie Olsen. MA

## 2017-05-05 ENCOUNTER — MYC MEDICAL ADVICE (OUTPATIENT)
Dept: FAMILY MEDICINE | Facility: CLINIC | Age: 62
End: 2017-05-05

## 2017-05-05 DIAGNOSIS — J45.30 MILD PERSISTENT ASTHMA WITHOUT COMPLICATION: ICD-10-CM

## 2017-05-05 RX ORDER — ALBUTEROL SULFATE 90 UG/1
2 AEROSOL, METERED RESPIRATORY (INHALATION) EVERY 4 HOURS PRN
Qty: 1 INHALER | Refills: 4 | Status: SHIPPED | OUTPATIENT
Start: 2017-05-05 | End: 2019-06-30

## 2017-05-05 NOTE — TELEPHONE ENCOUNTER
albuterol (PROAIR HFA, PROVENTIL HFA, VENTOLIN HFA) 108 (90 BASE) MCG/ACT inhaler  Last Written Prescription Date: 11/7/2016  Last Fill Quantity: 1, # refills: 5    Last Office Visit with [facility], P or [facility] prescribing provider:  4/5/2017   Future Office Visit:       Date of Last Asthma Action Plan Letter:   Asthma Action Plan Q1 Year    Topic Date Due     Asthma Action Plan - yearly  11/07/2017      Asthma Control Test:   ACT Total Scores 11/7/2016   ACT TOTAL SCORE (Goal Greater than or Equal to 20) 22   In the past 12 months, how many times did you visit the emergency room for your asthma without being admitted to the hospital? 0   In the past 12 months, how many times were you hospitalized overnight because of your asthma? 0       Date of Last Spirometry Test:   No results found for this or any previous visit.

## 2017-05-05 NOTE — TELEPHONE ENCOUNTER
Prescription approved per [facility] Refill Protocol.    Signed Prescriptions:                        Disp   Refills    albuterol (PROAIR HFA/PROVENTIL HFA/VENTOL*1 Inha*4        Sig: Inhale 2 puffs into the lungs every 4 hours as needed           for shortness of breath / dyspnea  Authorizing Provider: CASSANDRA LA  Ordering User: MAYRA REDMOND, RN, BSN

## 2017-05-06 ENCOUNTER — TRANSFERRED RECORDS (OUTPATIENT)
Dept: HEALTH INFORMATION MANAGEMENT | Facility: CLINIC | Age: 62
End: 2017-05-06

## 2017-05-11 NOTE — PROGRESS NOTES
SUBJECTIVE:                                                    Wendy Francis is a 62 year old female who presents to clinic today for the following health issues:    Hospital Follow-up Visit:    Hospital/Nursing Home/IP Rehab Facility: [facility]  Date of Admission: 5/6/17  Date of Discharge: 5/8/17  Reason(s) for Admission: Asthma exacerbation             Problems taking medications regularly:  None       Medication changes since discharge: None       Problems adhering to non-medication therapy:  None    Summary of hospitalization:  CareEverywhere information obtained and reviewed  Diagnostic Tests/Treatments reviewed.  Follow up needed: none  Other Healthcare Providers Involved in Patient s Care:         [facility]  Update since discharge: improved.      Post Discharge Medication Reconciliation: discharge medications reconciled, continue medications without change.  Plan of care communicated with patient     Coding guidelines for this visit:  Type of Medical   Decision Making Face-to-Face Visit       within 7 Days of discharge Face-to-Face Visit        within 14 days of discharge   Moderate Complexity [id number]   High Complexity [id number]          Wendy Francis is a 62 year old female who presents with hospital follow-up of asthma exacerbation. Symptom onset of acute shortness of breath has been sudden, improving for a time period of 9 days. Severity is described as mild. Course of her symptoms over time is improving.    I have reviewed the patient's medical history in detail and updated the computerized patient record.     ROS:  C: NEGATIVE for fever, chills, change in weight  I: NEGATIVE for worrisome rashes, moles or lesions  E: NEGATIVE for vision changes or irritation  E/M: NEGATIVE for ear, mouth and throat problems  RESP:as above  CV: NEGATIVE for chest pain, palpitations or peripheral edema    OBJECTIVE:                                                    /80  Pulse 88  Temp 97.8  " F (36.6  C)  Resp 16  Ht 5' 6\" (1.676 m)  Wt 135 lb (61.2 kg)  SpO2 97%  BMI 21.79 kg/m2  Body mass index is 21.79 kg/(m^2).  GENERAL: healthy, alert and no distress  EYES: Eyes grossly normal to inspection, PERRL and conjunctivae and sclerae normal  HENT: ear canals and TM's normal, nose and mouth without ulcers or lesions  NECK: no adenopathy, no asymmetry, masses, or scars and thyroid normal to palpation  RESP: lungs clear to auscultation - no rales, rhonchi or wheezes  CV: regular rate and rhythm, normal S1 S2, no S3 or S4, no murmur, click or rub, no peripheral edema and peripheral pulses strong  MS: no gross musculoskeletal defects noted, no edema  PSYCH: mentation appears normal, affect normal/bright    Diagnostic Test Results:  none      ASSESSMENT/PLAN:                                                    (J45.30) Mild persistent asthma without complication  (primary encounter diagnosis)  Comment: improved   Plan: budesonide-formoterol (SYMBICORT) 80-4.5         MCG/ACT Inhaler        Follow-up ACT in 1 month     (I10) Hypertension goal BP (blood pressure) < 140/90  Comment: Well controlled with medications without side effects.     See Patient Instructions    Rosanna Lenz MD  [facility]    "

## 2017-05-11 NOTE — PATIENT INSTRUCTIONS
[facility]    If you have any questions regarding to your visit please contact your care team:       Team Red:   Clinic Hours Telephone Number   Dr. Rosanna Newby, NP   7am-7pm  Monday - Thursday   7am-5pm  Fridays  [phone]  (Appointment scheduling available 24/7)    Questions about your visit?   Team Line  [phone]   [facility] [place] [facility] [facility] 11am-9pm Monday-Friday Saturday-Sunday- 9am-5pm   [place] - 5pm-9pm Monday-Friday Saturday-Sunday- 9am-5pm  [phone] - [place]   [phone] - [place]       What options do I have for visits at the clinic other than the traditional office visit?  To expand how we care for you, many of our providers are utilizing electronic visits (e-visits) and telephone visits, when medically appropriate, for interactions with their patients rather than a visit in the clinic.   We also offer nurse visits for many medical concerns. Just like any other service, we will bill your insurance company for this type of visit based on time spent on the phone with your provider. Not all insurance companies cover these visits. Please check with your medical insurance if this type of visit is covered. You will be responsible for any charges that are not paid by your insurance.      E-visits via PokitDok:  generally incur a $35.00 fee.  Telephone visits:  Time spent on the phone: *charged based on time that is spent on the phone in increments of 10 minutes. Estimated cost:   5-10 mins $30.00   11-20 mins. $59.00   21-30 mins. $85.00     Use [employer] (secure email communication and access to your chart) to send your primary care provider a message or make an appointment. Ask someone on your Team how to sign up for PokitDok.  For a Price Quote for your services, please call our Consumer Price Line at [phone].      As always, Thank you for trusting us with your health care  needs!      Discharge TULIO Olsen  CMA

## 2017-05-15 ENCOUNTER — OFFICE VISIT (OUTPATIENT)
Dept: FAMILY MEDICINE | Facility: CLINIC | Age: 62
End: 2017-05-15
Payer: COMMERCIAL

## 2017-05-15 VITALS
TEMPERATURE: 97.8 F | HEIGHT: 66 IN | DIASTOLIC BLOOD PRESSURE: 80 MMHG | OXYGEN SATURATION: 97 % | RESPIRATION RATE: 16 BRPM | BODY MASS INDEX: 21.69 KG/M2 | WEIGHT: 135 LBS | HEART RATE: 88 BPM | SYSTOLIC BLOOD PRESSURE: 122 MMHG

## 2017-05-15 DIAGNOSIS — I10 HYPERTENSION GOAL BP (BLOOD PRESSURE) < 140/90: ICD-10-CM

## 2017-05-15 DIAGNOSIS — J45.30 MILD PERSISTENT ASTHMA WITHOUT COMPLICATION: Primary | ICD-10-CM

## 2017-05-15 PROCEDURE — 99214 OFFICE O/P EST MOD 30 MIN: CPT | Performed by: FAMILY MEDICINE

## 2017-05-15 RX ORDER — BUDESONIDE AND FORMOTEROL FUMARATE DIHYDRATE 80; 4.5 UG/1; UG/1
AEROSOL RESPIRATORY (INHALATION)
Qty: 3 INHALER | Refills: 1 | Status: SHIPPED | OUTPATIENT
Start: 2017-05-15 | End: 2018-01-01

## 2017-05-15 ASSESSMENT — PATIENT HEALTH QUESTIONNAIRE - PHQ9: 5. POOR APPETITE OR OVEREATING: NOT AT ALL

## 2017-05-15 ASSESSMENT — ANXIETY QUESTIONNAIRES

## 2017-05-15 NOTE — MR AVS SNAPSHOT
After Visit Summary   5/15/2017    Wendy Francis    MRN: [id number]           Patient Information     Date Of Birth          1955        Visit Information        Provider Department      5/15/2017 2:20 PM Rosanna Lenz MD [facility]        Today's Diagnoses     Mild persistent asthma without complication    -  1    Hypertension goal BP (blood pressure) < 140/90          Care Instructions    [facility]    If you have any questions regarding to your visit please contact your care team:       Team Red:   Clinic Hours Telephone Number   Dr. Rosanna Newby NP   7am-7pm  Monday - Thursday   7am-5pm  Fridays  [phone]  (Appointment scheduling available 24/7)    Questions about your visit?   Team Line  [phone]   Urgent Care - [place] - 11am-9pm Monday-Friday Saturday-Sunday- 9am-5pm   [place] - 5pm-9pm Monday-Friday Saturday-Sunday- 9am-5pm  [phone] - [facility]  [phone] - [place]       What options do I have for visits at the clinic other than the traditional office visit?  To expand how we care for you, many of our providers are utilizing electronic visits (e-visits) and telephone visits, when medically appropriate, for interactions with their patients rather than a visit in the clinic.   We also offer nurse visits for many medical concerns. Just like any other service, we will bill your insurance company for this type of visit based on time spent on the phone with your provider. Not all insurance companies cover these visits. Please check with your medical insurance if this type of visit is covered. You will be responsible for any charges that are not paid by your insurance.      E-visits via iPosition:  generally incur a $35.00 fee.  Telephone visits:  Time spent on the phone: *charged based on time that is spent on the phone in increments of 10 minutes. Estimated  cost:   5-10 mins $30.00   11-20 mins. $59.00   21-30 mins. $85.00     Use [employer]khart (secure email communication and access to your chart) to send your primary care provider a message or make an appointment. Ask someone on your Team how to sign up for [employer].  For a Price Quote for your services, please call our [employer] Line at [phone].      As always, Thank you for trusting us with your health care needs!      Discharge TULIO Olsen CMA          Follow-ups after your visit        Follow-up notes from your care team     Return in about 6 months (around 11/15/2017), or if symptoms worsen or fail to improve, for physical, asthma, depression.      Who to contact     If you have questions or need follow up information about today's clinic visit or your schedule please contact [facility] directly at [phone].  Normal or non-critical lab and imaging results will be communicated to you by MyChart, letter or phone within 4 business days after the clinic has received the results. If you do not hear from us within 7 days, please contact the clinic through StandDeskhart or phone. If you have a critical or abnormal lab result, we will notify you by phone as soon as possible.  Submit refill requests through [employer]etera or call your pharmacy and they will forward the refill request to us. Please allow 3 business days for your refill to be completed.          Additional Information About Your Visit        Stand[employer]khart Information     [employer] gives you secure access to your electronic health record. If you see a primary care provider, you can also send messages to your care team and make appointments. If you have questions, please call your primary care clinic.  If you do not have a primary care provider, please call [phone] and they will assist you.        Care EveryWhere ID     This is your Care EveryWhere ID. This could be used by other organizations to access your [facility]  "records  [id number]        Your Vitals Were     Pulse Temperature Respirations Height Pulse Oximetry BMI (Body Mass Index)    88 97.8  F (36.6  C) 16 5' 6\" (1.676 m) 97% 21.79 kg/m2       Blood Pressure from Last 3 Encounters:   05/15/17 122/80   04/12/17 153/78   04/05/17 136/88    Weight from Last 3 Encounters:   05/15/17 135 lb (61.2 kg)   04/05/17 137 lb (62.1 kg)   01/09/17 135 lb (61.2 kg)              We Performed the Following     DEPRESSION ACTION PLAN (DAP)          Where to get your medicines      These medications were sent to [email or web address] - [address] [address]     Phone:  [phone]     budesonide-formoterol 80-4.5 MCG/ACT Inhaler          Primary Care Provider Office Phone # Fax #    Rosanna Lenz [phone]       [address] [address]        Thank you!     Thank you for choosing [facility]  for your care. Our goal is always to provide you with excellent care. Hearing back from our patients is one way we can continue to improve our services. Please take a few minutes to complete the written survey that you may receive in the mail after your visit with us. Thank you!             Your Updated Medication List - Protect others around you: Learn how to safely use, store and throw away your medicines at www.disposemymeds.org.          This list is accurate as of: 5/15/17  2:29 PM.  Always use your most recent med list.                   Brand Name Dispense Instructions for use    albuterol 108 (90 BASE) MCG/ACT Inhaler    PROAIR HFA/PROVENTIL HFA/VENTOLIN HFA    1 Inhaler    Inhale 2 puffs into the lungs every 4 hours as needed for shortness of breath / dyspnea       budesonide-formoterol 80-4.5 MCG/ACT Inhaler    SYMBICORT    3 Inhaler    USE 2 INHALATIONS TWICE A DAY       cetirizine 10 MG tablet    zyrTEC     Take 1 tablet by mouth daily as needed " for allergies.       COMPOUND - PHARMACY TO MIX COMPOUNDED MEDICATION    CMPD RX    2000 mL    Budesonide 2 mg/L irrigation solution. Rinse with 20 mls in each nostril two times a day       Fish Oil 500 MG Caps      Take  by mouth. 1 daily       MULTI-VITAMIN PO      DAILY       tolterodine 4 MG 24 hr capsule    DETROL LA    90 capsule    Take 1 capsule (4 mg) by mouth daily

## 2017-05-15 NOTE — NURSING NOTE
"Chief Complaint   Patient presents with     Hospital F/U       Initial /80  Pulse 88  Temp 97.8  F (36.6  C)  Resp 16  Ht 5' 6\" (1.676 m)  Wt 135 lb (61.2 kg)  SpO2 97%  BMI 21.79 kg/m2 Estimated body mass index is 21.79 kg/(m^2) as calculated from the following:    Height as of this encounter: 5' 6\" (1.676 m).    Weight as of this encounter: 135 lb (61.2 kg).  Medication Reconciliation: complete     Jodie Olsen. MA      "

## 2017-05-16 ASSESSMENT — ASTHMA QUESTIONNAIRES: ACT_TOTALSCORE: 18

## 2017-05-16 ASSESSMENT — ANXIETY QUESTIONNAIRES: GAD7 TOTAL SCORE: 2

## 2017-05-16 ASSESSMENT — PATIENT HEALTH QUESTIONNAIRE - PHQ9: SUM OF ALL RESPONSES TO PHQ QUESTIONS 1-9: 5

## 2017-05-24 DIAGNOSIS — N32.81 OVERACTIVE BLADDER: ICD-10-CM

## 2017-05-24 NOTE — TELEPHONE ENCOUNTER
tolterodine (DETROL LA) 4 MG 24 hr capsule      Last Written Prescription Date: 11/7/16  Last Fill Quantity: 90,  # refills: 3   Last Office Visit with [facility], [facility] or [facility] prescribing provider: 5/15/17

## 2017-05-26 RX ORDER — TOLTERODINE 4 MG/1
CAPSULE, EXTENDED RELEASE ORAL
Qty: 90 CAPSULE | Refills: 1 | Status: SHIPPED | OUTPATIENT
Start: 2017-05-26 | End: 2017-07-25

## 2017-05-26 NOTE — TELEPHONE ENCOUNTER
Prescription approved per [facility] Ref[facility] Protocol.    Signed Prescriptions:                        Disp   Refills    tolterodine (DETROL LA) 4 MG 24 hr capsule 90 cap*1        Sig: TAKE 1 CAPSULE DAILY  Authorizing Provider: CASSANDRA LA  Ordering User: MAYRA REDMOND, RN, BSN

## 2017-07-20 NOTE — PROGRESS NOTES
"  SUBJECTIVE:                                                    Wendy Francis is a 62 year old female who presents to clinic today for the following health issues:    Diarrhea      Duration: 6 weeks    Description:       Consistency of stool: loose       Blood in stool: YES- just when she wipes       Number of loose stools past 24 hours: 8-10    Intensity:  Moderate, not getting any better    Accompanying signs and symptoms:       Fever: no        Nausea/vomitting: YES - once       Abdominal pain: no        Weight loss: no     History (recent antibiotics or travel/ill contacts/med changes/testing done): colonoscopy with follow-up barium enema 2017     Precipitating or alleviating factors: None    Therapies tried and outcome: Imodium AD gives no relief    ROS:  C: NEGATIVE for fever, chills, change in weight  ENT/MOUTH: allergies   RESP: mild persistent asthma   GI: diarrhea    I have reviewed the patient's medical history in detail and updated the computerized patient record.     OBJECTIVE:     /88 (BP Location: Left arm, Patient Position: Chair, Cuff Size: Adult Regular)  Pulse 87  Temp 98.6  F (37  C) (Oral)  Ht 5' 6\" (1.676 m)  Wt 135 lb (61.2 kg)  SpO2 95%  BMI 21.79 kg/m2  Body mass index is 21.79 kg/(m^2).  GENERAL: healthy, alert and no distress  EYES: Eyes grossly normal to inspection, PERRL and conjunctivae and sclerae normal  HENT: ear canals and TM's normal, nose and mouth without ulcers or lesions  NECK: no adenopathy, no asymmetry, masses, or scars and thyroid normal to palpation  RESP: lungs clear to auscultation - no rales, rhonchi or wheezes  CV: regular rate and rhythm, normal S1 S2, no S3 or S4, no murmur   ABDOMEN: soft, nontender, no hepatosplenomegaly, no masses and bowel sounds normal  MS: no gross musculoskeletal defects noted, no edema  PSYCH: mentation appears normal, affect normal/bright    Diagnostic Test Results:  Results for orders placed or performed in visit on 04/13/17 "   XR Colon Barium    Narrative    Barium enema    Comparisons: None.    HISTORY: Heme positive stool. Incomplete colonoscopy.    FINDINGS: There are multiple surgical clips in the pelvis on the   view. Single column barium enema was performed. No bladder mass or  polyp is identified. No mucosal abnormality is identified. There is  reflux of barium into the small bowel.      Impression    IMPRESSION: Negative barium enema.    REMINGTON ALANIZ MD       ASSESSMENT/PLAN:   (R19.7) Diarrhea, unspecified type  (primary encounter diagnosis)  Plan: Enteric Bacteria and Virus Panel by MARY Stool,         Clostridium difficile toxin B PCR,         diphenoxylate-atropine (LOMOTIL) 2.5-0.025 MG         per tablet        Discussed risks and benefits of this medication. No further refills until follow-up appointment. Call or return to clinic as needed if these symptoms worsen or fail to improve as anticipated.     (J45.30) Mild persistent asthma without complication  Comment: controlled with medications without side effects.         See Patient Instructions    Rosanna Lenz MD  [facility]

## 2017-07-20 NOTE — PATIENT INSTRUCTIONS
[facility]    If you have any questions regarding to your visit please contact your care team:       Team Red:   Clinic Hours Telephone Number   Dr. Rosanna Newby, NP   7am-7pm  Monday - Thursday   7am-5pm  Fridays  [phone]  (Appointment scheduling available 24/7)    Questions about your visit?   Team Line  [phone]   Urgent Care - [place] - 11am-9pm Monday-Friday Saturday-Sunday- 9am-5pm   [place] - 5pm-9pm Monday-Friday Saturday-Sunday- 9am-5pm  [phone] - [place]   [phone] - [place]       What options do I have for visits at the clinic other than the traditional office visit?  To expand how we care for you, many of our providers are utilizing electronic visits (e-visits) and telephone visits, when medically appropriate, for interactions with their patients rather than a visit in the clinic.   We also offer nurse visits for many medical concerns. Just like any other service, we will bill your insurance company for this type of visit based on time spent on the phone with your provider. Not all insurance companies cover these visits. Please check with your medical insurance if this type of visit is covered. You will be responsible for any charges that are not paid by your insurance.      E-visits via AtHoc:  generally incur a $35.00 fee.  Telephone visits:  Time spent on the phone: *charged based on time that is spent on the phone in increments of 10 minutes. Estimated cost:   5-10 mins $30.00   11-20 mins. $59.00   21-30 mins. $85.00     Use [employer]ict (secure email communication and access to your chart) to send your primary care provider a message or make an appointment. Ask someone on your Team how to sign up for AtHoc.  For a Price Quote for your services, please call our Consumer Price Line at [phone].      As always, Thank you for trusting us with your health care needs!  Roxie EMANUEL  MA

## 2017-07-25 ENCOUNTER — OFFICE VISIT (OUTPATIENT)
Dept: FAMILY MEDICINE | Facility: CLINIC | Age: 62
End: 2017-07-25
Payer: COMMERCIAL

## 2017-07-25 VITALS
BODY MASS INDEX: 21.69 KG/M2 | HEART RATE: 87 BPM | HEIGHT: 66 IN | SYSTOLIC BLOOD PRESSURE: 136 MMHG | OXYGEN SATURATION: 95 % | TEMPERATURE: 98.6 F | DIASTOLIC BLOOD PRESSURE: 88 MMHG | WEIGHT: 135 LBS

## 2017-07-25 DIAGNOSIS — J45.30 MILD PERSISTENT ASTHMA WITHOUT COMPLICATION: ICD-10-CM

## 2017-07-25 DIAGNOSIS — R19.7 DIARRHEA, UNSPECIFIED TYPE: Primary | ICD-10-CM

## 2017-07-25 PROCEDURE — 99214 OFFICE O/P EST MOD 30 MIN: CPT | Performed by: FAMILY MEDICINE

## 2017-07-25 RX ORDER — DIPHENOXYLATE HCL/ATROPINE 2.5-.025MG
2 TABLET ORAL 4 TIMES DAILY PRN
Qty: 30 TABLET | Refills: 0 | Status: SHIPPED | OUTPATIENT
Start: 2017-07-25 | End: 2018-08-06

## 2017-07-25 NOTE — MR AVS SNAPSHOT
After Visit Summary   7/25/2017    Wendy Francis    MRN: [id number]           Patient Information     Date Of Birth          1955        Visit Information        Provider Department      7/25/2017 6:20 PM Rosanna Lenz MD [facility]        Today's Diagnoses     Diarrhea, unspecified type    -  1    Mild persistent asthma without complication          Care Instructions    [facility]    If you have any questions regarding to your visit please contact your care team:       Team Red:   Clinic Hours Telephone Number   Dr. Rosanna Newby NP   7am-7pm  Monday - Thursday   7am-5pm  Fridays  [phone]  (Appointment scheduling available 24/7)    Questions about your visit?   Team Line  [phone]   Urgent Care - [place] [facility] - 11am-9pm Monday-Friday Saturday-Sunday- 9am-5pm   [place] - 5pm-9pm Monday-Friday Saturday-Sunday- 9am-5pm  [phone] - [place]   [phone] - [place]       What options do I have for visits at the clinic other than the traditional office visit?  To expand how we care for you, many of our providers are utilizing electronic visits (e-visits) and telephone visits, when medically appropriate, for interactions with their patients rather than a visit in the clinic.   We also offer nurse visits for many medical concerns. Just like any other service, we will bill your insurance company for this type of visit based on time spent on the phone with your provider. Not all insurance companies cover these visits. Please check with your medical insurance if this type of visit is covered. You will be responsible for any charges that are not paid by your insurance.      E-visits via [employer]:  generally incur a $35.00 fee.  Telephone visits:  Time spent on the phone: *charged based on time that is spent on the phone in increments of 10 minutes. Estimated cost:   5-10 mins  $30.00   11-20 mins. $59.00   21-30 mins. $85.00     Use Exacasterhart (secure email communication and access to your chart) to send your primary care provider a message or make an appointment. Ask someone on your Team how to sign up for Lucky Pai.  For a Price Quote for your services, please call our ASSET4 Line at [phone].      As always, Thank you for trusting us with your health care needs!  Roxie EMANUEL MA            Follow-ups after your visit        Follow-up notes from your care team     Return in about 6 months (around 1/25/2018), or if symptoms worsen or fail to improve, for physical, asthma.      Future tests that were ordered for you today     Open Future Orders        Priority Expected Expires Ordered    Enteric Bacteria and Virus Panel by MARY Stool Routine 7/25/2017 7/25/2018 7/25/2017    Clostridium difficile toxin B PCR Routine 7/25/2017 8/24/2017 7/25/2017            Who to contact     If you have questions or need follow up information about today's clinic visit or your schedule please contact [facility] directly at [phone].  Normal or non-critical lab and imaging results will be communicated to you by Exacasterhart, letter or phone within 4 business days after the clinic has received the results. If you do not hear from us within 7 days, please contact the clinic through Exacasterhart or phone. If you have a critical or abnormal lab result, we will notify you by phone as soon as possible.  Submit refill requests through Lucky Pai or call your pharmacy and they will forward the refill request to us. Please allow 3 business days for your refill to be completed.          Additional Information About Your Visit        Exacasterhart Information     [employer] gives you secure access to your electronic health record. If you see a primary care provider, you can also send messages to your care team and make appointments. If you have questions, please call your primary care clinic.  If you do not have a  "primary care provider, please call [phone] and they will assist you.        Care EveryWhere ID     This is your Care EveryWhere ID. This could be used by other organizations to access your [facility] medical records  [id number]        Your Vitals Were     Pulse Temperature Height Pulse Oximetry BMI (Body Mass Index)       87 98.6  F (37  C) (Oral) 5' 6\" (1.676 m) 95% 21.79 kg/m2        Blood Pressure from Last 3 Encounters:   07/25/17 136/88   05/15/17 122/80   04/12/17 153/78    Weight from Last 3 Encounters:   07/25/17 135 lb (61.2 kg)   05/15/17 135 lb (61.2 kg)   04/05/17 137 lb (62.1 kg)                 Today's Medication Changes          These changes are accurate as of: 7/25/17  6:51 PM.  If you have any questions, ask your nurse or doctor.               Start taking these medicines.        Dose/Directions    diphenoxylate-atropine 2.5-0.025 MG per tablet   Commonly known as:  LOMOTIL   Used for:  Diarrhea, unspecified type   Started by:  Rosanna Lenz MD        Dose:  2 tablet   Take 2 tablets by mouth 4 times daily as needed for diarrhea . No further refills until follow-up appointment   Quantity:  30 tablet   Refills:  0            Where to get your medicines      Some of these will need a paper prescription and others can be bought over the counter.  Ask your nurse if you have questions.     Bring a paper prescription for each of these medications     diphenoxylate-atropine 2.5-0.025 MG per tablet                Primary Care Provider Office Phone # Fax #    Rosanna Lenz [phone]       [address] [address]        Equal Access to Services     [facility]: Shayla Neumann, wajenny lumonroe, qayumi kaalmada tesfaye, rajwinder joy. So [facility] [phone].    ATENCIÓN: Si habla español, tiene a wolff disposición servicios gratuitos de asistencia lingüística. Llame al [phone].    We comply with " applicable federal civil rights laws and [place] laws. We do not discriminate on the basis of race, color, national origin, age, disability sex, sexual orientation or gender identity.            Thank you!     Thank you for choosing [facility]  for your care. Our goal is always to provide you with excellent care. Hearing back from our patients is one way we can continue to improve our services. Please take a few minutes to complete the written survey that you may receive in the mail after your visit with us. Thank you!             Your Updated Medication List - Protect others around you: Learn how to safely use, store and throw away your medicines at www.disposemymeds.org.          This list is accurate as of: 7/25/17  6:51 PM.  Always use your most recent med list.                   Brand Name Dispense Instructions for use Diagnosis    albuterol 108 (90 BASE) MCG/ACT Inhaler    PROAIR HFA/PROVENTIL HFA/VENTOLIN HFA    1 Inhaler    Inhale 2 puffs into the lungs every 4 hours as needed for shortness of breath / dyspnea    Mild persistent asthma without complication       budesonide-formoterol 80-4.5 MCG/ACT Inhaler    SYMBICORT    3 Inhaler    USE 2 INHALATIONS TWICE A DAY    Mild persistent asthma without complication       cetirizine 10 MG tablet    zyrTEC     Take 1 tablet by mouth daily as needed for allergies.        COMPOUNDED NON-CONTROLLED SUBSTANCE - PHARMACY TO MIX COMPOUNDED MEDICATION    CMPD RX    2000 mL    Budesonide 2 mg/L irrigation solution. Rinse with 20 mls in each nostril two times a day    Nasal polyposis       diphenoxylate-atropine 2.5-0.025 MG per tablet    LOMOTIL    30 tablet    Take 2 tablets by mouth 4 times daily as needed for diarrhea . No further refills until follow-up appointment    Diarrhea, unspecified type       Fish Oil 500 MG Caps      Take  by mouth. 1 daily        MULTI-VITAMIN PO      DAILY        tolterodine 4 MG 24 hr capsule    DETROL LA    90 capsule     Take 1 capsule (4 mg) by mouth daily    Overactive bladder

## 2017-07-25 NOTE — NURSING NOTE
"Chief Complaint   Patient presents with     Diarrhea       Initial /88 (BP Location: Left arm, Patient Position: Chair, Cuff Size: Adult Regular)  Pulse 87  Temp 98.6  F (37  C) (Oral)  Ht 5' 6\" (1.676 m)  Wt 135 lb (61.2 kg)  SpO2 95%  BMI 21.79 kg/m2 Estimated body mass index is 21.79 kg/(m^2) as calculated from the following:    Height as of this encounter: 5' 6\" (1.676 m).    Weight as of this encounter: 135 lb (61.2 kg).  Medication Reconciliation: complete    "

## 2017-07-26 DIAGNOSIS — R19.7 DIARRHEA, UNSPECIFIED TYPE: ICD-10-CM

## 2017-07-26 PROCEDURE — 87493 C DIFF AMPLIFIED PROBE: CPT | Performed by: FAMILY MEDICINE

## 2017-07-26 PROCEDURE — 87506 IADNA-DNA/RNA PROBE TQ 6-11: CPT | Performed by: FAMILY MEDICINE

## 2017-07-26 ASSESSMENT — ASTHMA QUESTIONNAIRES: ACT_TOTALSCORE: 23

## 2017-07-27 LAB
C DIFF TOX B STL QL: NORMAL
SPECIMEN SOURCE: NORMAL

## 2017-07-27 NOTE — PROGRESS NOTES
Your results are normal.  Your final test results are pending.  Please check your chart again within 3 to 5 days. You will receive further instruction when your full test result panel is complete.    Rosanna Lenz MD

## 2017-07-28 LAB
CAMPYLOBACTER GROUP BY NAT: NOT DETECTED
ENTERIC PATHOGEN COMMENT: NORMAL
NOROVIRUS I AND II BY NAT: NOT DETECTED
ROTAVIRUS A BY NAT: NOT DETECTED
SALMONELLA SPECIES BY NAT: NOT DETECTED
SHIGA TOXIN 1 GENE BY NAT: NOT DETECTED
SHIGA TOXIN 2 GENE BY NAT: NOT DETECTED
SHIGELLA SP+EIEC IPAH STL QL NAA+PROBE: NOT DETECTED
VIBRIO GROUP BY NAT: NOT DETECTED
YERSINIA ENTEROCOLITICA BY NAT: NOT DETECTED

## 2017-07-28 NOTE — PROGRESS NOTES
Your results are normal. Call or return to clinic as needed if these symptoms worsen or fail to improve as anticipated.     Rosanna Lenz MD

## 2017-08-04 ENCOUNTER — MYC MEDICAL ADVICE (OUTPATIENT)
Dept: FAMILY MEDICINE | Facility: CLINIC | Age: 62
End: 2017-08-04

## 2017-08-04 NOTE — TELEPHONE ENCOUNTER
Pt was seen for diarrhea issues on 7/25/17.  Please review the mychart message below and advise.    Josse ROCHA, RN, BSN

## 2017-08-07 ENCOUNTER — MYC MEDICAL ADVICE (OUTPATIENT)
Dept: FAMILY MEDICINE | Facility: CLINIC | Age: 62
End: 2017-08-07

## 2017-08-07 DIAGNOSIS — R19.7 DIARRHEA, UNSPECIFIED TYPE: Primary | ICD-10-CM

## 2017-08-18 ENCOUNTER — TRANSFERRED RECORDS (OUTPATIENT)
Dept: HEALTH INFORMATION MANAGEMENT | Facility: CLINIC | Age: 62
End: 2017-08-18

## 2017-08-21 ENCOUNTER — TRANSFERRED RECORDS (OUTPATIENT)
Dept: HEALTH INFORMATION MANAGEMENT | Facility: CLINIC | Age: 62
End: 2017-08-21

## 2017-10-06 ENCOUNTER — TRANSFERRED RECORDS (OUTPATIENT)
Dept: HEALTH INFORMATION MANAGEMENT | Facility: CLINIC | Age: 62
End: 2017-10-06

## 2018-01-01 DIAGNOSIS — J45.30 MILD PERSISTENT ASTHMA WITHOUT COMPLICATION: ICD-10-CM

## 2018-01-05 RX ORDER — DILTIAZEM HYDROCHLORIDE 60 MG/1
TABLET, FILM COATED ORAL
Qty: 30.6 G | Refills: 1 | Status: SHIPPED | OUTPATIENT
Start: 2018-01-05 | End: 2018-07-01

## 2018-01-05 NOTE — TELEPHONE ENCOUNTER
Spoke with [facility]. No prior auth. Needed. Patient will have a 60.00 dollar copay.  Roxie EMANUEL MA

## 2018-01-05 NOTE — TELEPHONE ENCOUNTER
Prescription approved per [facility] Refill Protocol.  Niurka Rowley, RN - BC

## 2018-01-06 ENCOUNTER — OFFICE VISIT (OUTPATIENT)
Dept: URGENT CARE | Facility: URGENT CARE | Age: 63
End: 2018-01-06
Payer: COMMERCIAL

## 2018-01-06 VITALS
BODY MASS INDEX: 20.98 KG/M2 | OXYGEN SATURATION: 91 % | WEIGHT: 130 LBS | HEART RATE: 133 BPM | TEMPERATURE: 99.5 F | SYSTOLIC BLOOD PRESSURE: 152 MMHG | DIASTOLIC BLOOD PRESSURE: 81 MMHG

## 2018-01-06 DIAGNOSIS — J06.9 VIRAL URI WITH COUGH: ICD-10-CM

## 2018-01-06 DIAGNOSIS — J45.21 MILD INTERMITTENT ASTHMA WITH EXACERBATION: Primary | ICD-10-CM

## 2018-01-06 PROCEDURE — 94640 AIRWAY INHALATION TREATMENT: CPT | Performed by: PHYSICIAN ASSISTANT

## 2018-01-06 PROCEDURE — 99214 OFFICE O/P EST MOD 30 MIN: CPT | Mod: 25 | Performed by: PHYSICIAN ASSISTANT

## 2018-01-06 RX ORDER — IPRATROPIUM BROMIDE AND ALBUTEROL SULFATE 2.5; .5 MG/3ML; MG/3ML
1 SOLUTION RESPIRATORY (INHALATION) ONCE
Qty: 3 ML | Refills: 0
Start: 2018-01-06 | End: 2019-01-09

## 2018-01-06 RX ORDER — IPRATROPIUM BROMIDE AND ALBUTEROL SULFATE 2.5; .5 MG/3ML; MG/3ML
1 SOLUTION RESPIRATORY (INHALATION) EVERY 6 HOURS PRN
Qty: 30 VIAL | Refills: 0 | Status: SHIPPED | OUTPATIENT
Start: 2018-01-06 | End: 2019-06-30

## 2018-01-06 RX ORDER — PREDNISONE 20 MG/1
40 TABLET ORAL DAILY
Qty: 10 TABLET | Refills: 0 | Status: SHIPPED | OUTPATIENT
Start: 2018-01-06 | End: 2018-01-11

## 2018-01-06 ASSESSMENT — ENCOUNTER SYMPTOMS
PSYCHIATRIC NEGATIVE: 1
CARDIOVASCULAR NEGATIVE: 1
GASTROINTESTINAL NEGATIVE: 1
EYES NEGATIVE: 1
NEUROLOGICAL NEGATIVE: 1
MUSCULOSKELETAL NEGATIVE: 1

## 2018-01-06 NOTE — NURSING NOTE
"Chief Complaint   Patient presents with     Cough     Patient complains of cough and wheezing       Initial /81 (BP Location: Left arm, Patient Position: Chair, Cuff Size: Adult Regular)  Pulse 133  Temp 99.5  F (37.5  C) (Oral)  Wt 130 lb (59 kg)  SpO2 91%  BMI 20.98 kg/m2 Estimated body mass index is 20.98 kg/(m^2) as calculated from the following:    Height as of 7/25/17: 5' 6\" (1.676 m).    Weight as of this encounter: 130 lb (59 kg).  Medication Reconciliation: complete       Tess Lancaster    "

## 2018-01-06 NOTE — PROGRESS NOTES
SUBJECTIVE:   Wendy Francis is a 62 year old female presenting with a chief complaint of   Chief Complaint   Patient presents with     Cough     Patient complains of cough and wheezing   .    Onset of symptoms was 1 day(s) ago.  Course of illness is worsening.    Severity moderate  Current and Associated symptoms: wheezing, cough, tightness in chest, mucinex, sinus medications  Treatment measures tried include inhaler.  Predisposing factors include None.    Tored after work yesterday  She has a headache, body aches, wheezing, chills,   She feels short of breath  She has very seldom asthma attacks - maybe 2 a year  No fever, no chest pain     Hx of pneumonia 1x    Review of Systems   Constitutional:        As in HPI   HENT:        As in HPI   Eyes: Negative.    Respiratory:        As in HPI   Cardiovascular: Negative.    Gastrointestinal: Negative.    Genitourinary: Negative.    Musculoskeletal: Negative.    Skin: Negative.    Neurological: Negative.    Psychiatric/Behavioral: Negative.          Past Medical History:   Diagnosis Date     Allergic rhinitis     molds, dust     Atrophic vaginitis      Cervical cancer (H) 4/10/2000    chemo and radiation, IB-II squamous cell, positive nodes     Diverticulosis of colon 6/2004     Hypertension goal BP (blood pressure) < 140/90      Mild persistent asthma      Mild recurrent major depression (H)      Nasal polyps      Overactive bladder      Sensorineural hearing losses      Tinnitus      Varicose vein of leg     No surgery     Current Outpatient Prescriptions   Medication Sig Dispense Refill     ipratropium - albuterol 0.5 mg/2.5 mg/3 mL (DUONEB) 0.5-2.5 (3) MG/3ML neb solution Take 1 vial (3 mLs) by nebulization once for 1 dose 3 mL 0     predniSONE (DELTASONE) 20 MG tablet Take 2 tablets (40 mg) by mouth daily for 5 days 10 tablet 0     ipratropium - albuterol 0.5 mg/2.5 mg/3 mL (DUONEB) 0.5-2.5 (3) MG/3ML neb solution Take 1 vial (3 mLs) by nebulization every 6 hours  as needed for shortness of breath / dyspnea or wheezing 30 vial 0     order for DME Equipment being ordered: Nebulizer 1 Device 0     SYMBICORT 80-4.5 MCG/ACT Inhaler USE 2 INHALATIONS TWICE A DAY 30.6 g 1     tolterodine (DETROL LA) 4 MG 24 hr capsule TAKE 1 CAPSULE DAILY 90 capsule 1     diphenoxylate-atropine (LOMOTIL) 2.5-0.025 MG per tablet Take 2 tablets by mouth 4 times daily as needed for diarrhea . No further refills until follow-up appointment 30 tablet 0     albuterol (PROAIR HFA/PROVENTIL HFA/VENTOLIN HFA) 108 (90 BASE) MCG/ACT Inhaler Inhale 2 puffs into the lungs every 4 hours as needed for shortness of breath / dyspnea 1 Inhaler 4     COMPOUND (CMPD RX) - PHARMACY TO MIX COMPOUNDED MEDICATION Budesonide 2 mg/L irrigation solution. Rinse with 20 mls in each nostril two times a day 2000 mL 11     cetirizine (ZYRTEC) 10 MG tablet Take 1 tablet by mouth daily as needed for allergies.       Omega-3 Fatty Acids (FISH OIL) 500 MG CAPS Take  by mouth. 1 daily         MULTI-VITAMIN OR DAILY       Social History   Substance Use Topics     Smoking status: Never Smoker     Smokeless tobacco: Never Used     Alcohol use 1.0 oz/week      Comment: 1-2 a month       OBJECTIVE  /81 (BP Location: Left arm, Patient Position: Chair, Cuff Size: Adult Regular)  Pulse 133  Temp 99.5  F (37.5  C) (Oral)  Wt 130 lb (59 kg)  SpO2 91%  BMI 20.98 kg/m2    Physical Exam   Constitutional: She is oriented to person, place, and time and well-developed, well-nourished, and in no distress.   HENT:   Head: Normocephalic and atraumatic.   Right Ear: Tympanic membrane and ear canal normal.   Left Ear: Tympanic membrane and ear canal normal.   Nose: Nose normal.   Mouth/Throat: Uvula is midline, oropharynx is clear and moist and mucous membranes are normal.   Eyes: Conjunctivae and EOM are normal. Pupils are equal, round, and reactive to light.   Neck: Normal range of motion. Neck supple.   Cardiovascular: Normal rate, regular  rhythm and normal heart sounds.    Pulmonary/Chest: Effort normal. No tachypnea. No respiratory distress. She has wheezes. She has no rhonchi. She has no rales.   Neurological: She is alert and oriented to person, place, and time. Gait normal.   Skin: Skin is warm and dry.   Psychiatric: Mood and affect normal.       Labs:  No results found for this or any previous visit (from the past 24 hour(s)).        ASSESSMENT:      ICD-10-CM    1. Mild intermittent asthma with exacerbation J45.21 ipratropium - albuterol 0.5 mg/2.5 mg/3 mL (DUONEB) 0.5-2.5 (3) MG/3ML neb solution     INHALATION/NEBULIZER TREATMENT, INITIAL     ALBUTEROL/IPRATROPIUM 3ML NEB     predniSONE (DELTASONE) 20 MG tablet     ipratropium - albuterol 0.5 mg/2.5 mg/3 mL (DUONEB) 0.5-2.5 (3) MG/3ML neb solution     order for DME   2. Viral URI with cough J06.9 predniSONE (DELTASONE) 20 MG tablet    B97.89         Medical Decision Making:    Duoneb today helped her symptoms, but wheezing sounded the same  Home duonebs ordered and prednisone  She will return if getting worse, and we would consider an antibiotic at that time or possible chest XR    Serious Comorbid Conditions:  Adult:  Asthma    PLAN:    URI Adult:  Fluids, Rest, OTC cough suppressant/expectorant and Rx Asthma exacerbation  Prednisone and Duoneb    Followup:    If not improving or if condition worsens, follow up with your Primary Care Provider    There are no Patient Instructions on file for this visit.    Siena Cantrell PA-C

## 2018-01-06 NOTE — MR AVS SNAPSHOT
After Visit Summary   1/6/2018    Wendy Francis    MRN: [id number]           Patient Information     Date Of Birth          1955        Visit Information        Provider Department      1/6/2018 11:05 AM Siena Cantrell PA-C [facility]        Today's Diagnoses     Mild intermittent asthma with exacerbation    -  1    Viral URI with cough           Follow-ups after your visit        Who to contact     If you have questions or need follow up information about today's clinic visit or your schedule please contact [facility] directly at [phone].  Normal or non-critical lab and imaging results will be communicated to you by PCT Internationalhart, letter or phone within 4 business days after the clinic has received the results. If you do not hear from us within 7 days, please contact the clinic through [employer] or phone. If you have a critical or abnormal lab result, we will notify you by phone as soon as possible.  Submit refill requests through IES or call your pharmacy and they will forward the refill request to us. Please allow 3 business days for your refill to be completed.          Additional Information About Your Visit        MyChart Information     IES gives you secure access to your electronic health record. If you see a primary care provider, you can also send messages to your care team and make appointments. If you have questions, please call your primary care clinic.  If you do not have a primary care provider, please call [phone] and they will assist you.        Care EveryWhere ID     This is your Care EveryWhere ID. This could be used by other organizations to access your [facility] medical records  [id number]        Your Vitals Were     Pulse Temperature Pulse Oximetry BMI (Body Mass Index)          133 99.5  F (37.5  C) (Oral) 91% 20.98 kg/m2         Blood Pressure from Last 3 Encounters:   01/06/18 152/81   07/25/17 136/88    05/15/17 122/80    Weight from Last 3 Encounters:   01/06/18 130 lb (59 kg)   07/25/17 135 lb (61.2 kg)   05/15/17 135 lb (61.2 kg)              We Performed the Following     ALBUTEROL/IPRATROPIUM 3ML NEB     INHALATION/NEBULIZER TREATMENT, INITIAL          Today's Medication Changes          These changes are accurate as of: 1/6/18 11:53 AM.  If you have any questions, ask your nurse or doctor.               Start taking these medicines.        Dose/Directions    * ipratropium - albuterol 0.5 mg/2.5 mg/3 mL 0.5-2.5 (3) MG/3ML neb solution   Commonly known as:  DUONEB   Used for:  Mild intermittent asthma with exacerbation   Started by:  Siena Cantrell PA-C        Dose:  1 vial   Take 1 vial (3 mLs) by nebulization once for 1 dose   Quantity:  3 mL   Refills:  0       * ipratropium - albuterol 0.5 mg/2.5 mg/3 mL 0.5-2.5 (3) MG/3ML neb solution   Commonly known as:  DUONEB   Used for:  Mild intermittent asthma with exacerbation   Started by:  Siena Cantrell PA-C        Dose:  1 vial   Take 1 vial (3 mLs) by nebulization every 6 hours as needed for shortness of breath / dyspnea or wheezing   Quantity:  30 vial   Refills:  0       order for DME   Used for:  Mild intermittent asthma with exacerbation   Started by:  Siena Cantrell PA-C        Equipment being ordered: Nebulizer   Quantity:  1 Device   Refills:  0       predniSONE 20 MG tablet   Commonly known as:  DELTASONE   Used for:  Mild intermittent asthma with exacerbation, Viral URI with cough   Started by:  Siena Cantrell PA-C        Dose:  40 mg   Take 2 tablets (40 mg) by mouth daily for 5 days   Quantity:  10 tablet   Refills:  0       * Notice:  This list has 2 medication(s) that are the same as other medications prescribed for you. Read the directions carefully, and ask your doctor or other care provider to review them with you.         Where to get your medicines      These medications [place] [facility] [place] [address]  [address], [facility] [address]     Phone:  [phone]     ipratropium - albuterol 0.5 mg/2.5 mg/3 mL 0.5-2.5 (3) MG/3ML neb solution    predniSONE 20 MG tablet         Some of these will need a paper prescription and others can be bought over the counter.  Ask your nurse if you have questions.     Bring a paper prescription for each of these medications     order for DME       You don't need a prescription for these medications     ipratropium - albuterol 0.5 mg/2.5 mg/3 mL 0.5-2.5 (3) MG/3ML neb solution                Primary Care Provider Office Phone # Fax #    Rosanna Lenz [phone] [address] [facility]  [place] [address]        Equal Access to Services     [facility]: Hadii breonna louise hadasho Sojane, waaxda luqadaha, qaybta kaalmada adeegyada, rajwinder harris . So [facility] [phone].    ATENCIÓN: Si habla español, tiene a wolff disposición servicios gratuitos de asistencia lingüística. Ll[facility] [phone].    We comply with applicable federal civil rights laws and [place] laws. We do not discriminate on the basis of race, color, national origin, age, disability, sex, sexual orientation, or gender identity.            Thank you!     Thank you for choosing [facility]  for your care. Our goal is always to provide you with excellent care. Hearing back from our patients is one way we can continue to improve our services. Please take a few minutes to complete the written survey that you may receive in the mail after your visit with us. Thank you!             Your Updated Medication List - Protect others around you: Learn how to safely use, store and throw away your medicines at www.disposemymeds.org.          This list is accurate as of: 1/6/18 11:54 AM.  Always use your most recent med list.                   Brand Name Dispense Instructions for use Diagnosis     albuterol 108 (90 BASE) MCG/ACT Inhaler    PROAIR HFA/PROVENTIL HFA/VENTOLIN HFA    1 Inhaler    Inhale 2 puffs into the lungs every 4 hours as needed for shortness of breath / dyspnea    Mild persistent asthma without complication       cetirizine 10 MG tablet    zyrTEC     Take 1 tablet by mouth daily as needed for allergies.        COMPOUNDED NON-CONTROLLED SUBSTANCE - PHARMACY TO MIX COMPOUNDED MEDICATION    CMPD RX    2000 mL    Budesonide 2 mg/L irrigation solution. Rinse with 20 mls in each nostril two times a day    Nasal polyposis       diphenoxylate-atropine 2.5-0.025 MG per tablet    LOMOTIL    30 tablet    Take 2 tablets by mouth 4 times daily as needed for diarrhea . No further refills until follow-up appointment    Diarrhea, unspecified type       Fish Oil 500 MG Caps      Take  by mouth. 1 daily        * ipratropium - albuterol 0.5 mg/2.5 mg/3 mL 0.5-2.5 (3) MG/3ML neb solution    DUONEB    3 mL    Take 1 vial (3 mLs) by nebulization once for 1 dose    Mild intermittent asthma with exacerbation       * ipratropium - albuterol 0.5 mg/2.5 mg/3 mL 0.5-2.5 (3) MG/3ML neb solution    DUONEB    30 vial    Take 1 vial (3 mLs) by nebulization every 6 hours as needed for shortness of breath / dyspnea or wheezing    Mild intermittent asthma with exacerbation       MULTI-VITAMIN PO      DAILY        order for DME     1 Device    Equipment being ordered: Nebulizer    Mild intermittent asthma with exacerbation       predniSONE 20 MG tablet    DELTASONE    10 tablet    Take 2 tablets (40 mg) by mouth daily for 5 days    Mild intermittent asthma with exacerbation, Viral URI with cough       SYMBICORT 80-4.5 MCG/ACT Inhaler   Generic drug:  budesonide-formoterol     30.6 g    USE 2 INHALATIONS TWICE A DAY    Mild persistent asthma without complication       tolterodine 4 MG 24 hr capsule    DETROL LA    90 capsule    TAKE 1 CAPSULE DAILY    Overactive bladder       * Notice:  This list has 2 medication(s) that are  the same as other medications prescribed for you. Read the directions carefully, and ask your doctor or other care provider to review them with you.

## 2018-01-06 NOTE — NURSING NOTE
The following nebulizer treatment was given:     MEDICATION: Duoneb  : [employer]  LOT #: [id number]  EXPIRATION DATE:  July 2019  NDC # 6420-7414-88     Christianne Antonio MA

## 2018-01-20 ENCOUNTER — HEALTH MAINTENANCE LETTER (OUTPATIENT)
Age: 63
End: 2018-01-20

## 2018-05-14 DIAGNOSIS — J33.9 NASAL POLYPOSIS: ICD-10-CM

## 2018-05-14 NOTE — TELEPHONE ENCOUNTER
Budesonide 2mg/ml Irrigation Soln     Last Written Prescription Date:  04/05/2017  Last Fill Quantity: 2000 mls,   # refills: 11  Last Office Visit: 01/06/2018  Future Office visit:       Routing refill request to provider for review/approval because:  Drug not on the [facility], [facility] or [facility] refill protocol or controlled substance        Todd Beltran  Compounding Pharmacy Technician  [facility]   [address] [place] [address]   Phone: [phone]  Fax: [phone]

## 2018-07-01 DIAGNOSIS — J45.30 MILD PERSISTENT ASTHMA WITHOUT COMPLICATION: ICD-10-CM

## 2018-07-02 NOTE — TELEPHONE ENCOUNTER
"Pending Prescriptions:                       Disp   Refills    SYMBICORT 80-4.5 MCG/ACT Inhaler [Pharmac*30.6 g 1            Sig: USE 2 INHALATIONS TWICE A DAY    Failed [facility] refill protocol, see below:    Requested Prescriptions   Pending Prescriptions Disp Refills     SYMBICORT 80-4.5 MCG/ACT Inhaler [Pharmacy Med Name: SYMBICORT INH AEROSOL 80/4.5] 30.6 g 1     Sig: USE 2 INHALATIONS TWICE A DAY    Inhaled Steroids Protocol Failed    7/2/2018  7:43 AM       Failed - Asthma control assessment score within normal limits in last 6 months    Please review ACT score.          Passed - Patient is age 12 or older       Passed - Recent (6 mo) or future (30 days) visit within the authorizing provider's specialty    Patient had office visit in the last 6 months or has a visit in the next 30 days with authorizing provider or within the authorizing provider's specialty.  See \"Patient Info\" tab in inbasket, or \"Choose Columns\" in Meds & Orders section of the refill encounter.            Raquel Moulton RN    "

## 2018-07-03 RX ORDER — BUDESONIDE AND FORMOTEROL FUMARATE DIHYDRATE 80; 4.5 UG/1; UG/1
2 AEROSOL RESPIRATORY (INHALATION) 2 TIMES DAILY
Qty: 1 INHALER | Refills: 0 | Status: SHIPPED | OUTPATIENT
Start: 2018-07-03 | End: 2018-08-06

## 2018-07-03 NOTE — TELEPHONE ENCOUNTER
MA - call patient and schedule follow up appointment. Medication filled for 1 month.    Niurka Rowley RN - BC

## 2018-08-01 NOTE — PATIENT INSTRUCTIONS
Did you know you can lower your blood pressure with your daily habits?    *Losing 20 pounds of weight lowers blood pressure 5 to 20 points.  *Eating a low-fat diet rich in fruits, vegetables and low-fat dairy lowers blood pressure 8 to 14 points.  *Eating a low-salt diet lowers blood pressure 2 to 8 points.  *Exercising regularly lowers blood pressure 4 to 9 points.  *Reducing alcohol use lowers blood pressure 2 to 4 points.    [facility]    If you have any questions regarding to your visit please contact your care team:       Team Red:   Clinic Hours Telephone Number   Dr. Rosanna Newby, NP   7am-7pm  Monday - Thursday   7am-5pm  Fridays  [phone]  (Appointment scheduling available 24/7)    Questions about your recent visit?   Team Line  [phone]   Urgent Care - [place] and [facility] - 11am-9pm Monday-Friday Saturday-Sunday- 9am-5pm   [place] - 5pm-9pm Monday-Friday Saturday-Sunday- 9am-5pm  [phone] - [place]  [phone] - [place]       What options do I have for a visit other than an office visit? We offer electronic visits (e-visits) and telephone visits, when medically appropriate.  Please check with your medical insurance to see if these types of visits are covered, as you will be responsible for any charges that are not paid by your insurance.      You can use [employer] (secure electronic communication) to access to your chart, send your primary care provider a message, or make an appointment. Ask a team member how to get started.     For a price quote for your services, please call our Consumer Price Line at [phone] or our Imaging Cost estimation line at [phone] (for imaging tests).    Roxie EMANUEL MA      Preventive Health Recommendations  Female Ages 50 - 64    Yearly exam: See your health care provider every year in order to  o Review health changes.   o Discuss preventive care.    o Review your  medicines if your doctor has prescribed any.      Get a Pap test every three years (unless you have an abnormal result and your provider advises testing more often).    If you get Pap tests with HPV test, you only need to test every 5 years, unless you have an abnormal result.     You do not need a Pap test if your uterus was removed (hysterectomy) and you have not had cancer.    You should be tested each year for STDs (sexually transmitted diseases) if you're at risk.     Have a mammogram every 1 to 2 years.    Have a colonoscopy at age 50, or have a yearly FIT test (stool test). These exams screen for colon cancer.      Have a cholesterol test every 5 years, or more often if advised.    Have a diabetes test (fasting glucose) every three years. If you are at risk for diabetes, you should have this test more often.     If you are at risk for osteoporosis (brittle bone disease), think about having a bone density scan (DEXA).    Shots: Get a flu shot each year. Get a tetanus shot every 10 years.    Nutrition:     Eat at least 5 servings of fruits and vegetables each day.    Eat whole-grain bread, whole-wheat pasta and brown rice instead of white grains and rice.    Get adequate Calcium and Vitamin D.     Lifestyle    Exercise at least 150 minutes a week (30 minutes a day, 5 days a week). This will help you control your weight and prevent disease.    Limit alcohol to one drink per day.    No smoking.     Wear sunscreen to prevent skin cancer.     See your dentist every six months for an exam and cleaning.    See your eye doctor every 1 to 2 years.    It is recommended that you get a vaccination for shingles called Shingrix (given as 2 shots, 2 to 6 months apart), even if you have already had the Zostavax vaccine. Discuss getting the Shingix vaccine from your pharmacist, or schedule an ancillary shot visit here. Some insurances do not cover the cost of these vaccines.

## 2018-08-06 ENCOUNTER — TELEPHONE (OUTPATIENT)
Dept: OTHER | Facility: CLINIC | Age: 63
End: 2018-08-06

## 2018-08-06 ENCOUNTER — RADIANT APPOINTMENT (OUTPATIENT)
Dept: ULTRASOUND IMAGING | Facility: CLINIC | Age: 63
End: 2018-08-06
Attending: FAMILY MEDICINE
Payer: COMMERCIAL

## 2018-08-06 ENCOUNTER — OFFICE VISIT (OUTPATIENT)
Dept: FAMILY MEDICINE | Facility: CLINIC | Age: 63
End: 2018-08-06
Payer: COMMERCIAL

## 2018-08-06 VITALS
RESPIRATION RATE: 16 BRPM | BODY MASS INDEX: 21.82 KG/M2 | OXYGEN SATURATION: 95 % | HEART RATE: 87 BPM | DIASTOLIC BLOOD PRESSURE: 90 MMHG | TEMPERATURE: 98 F | HEIGHT: 67 IN | WEIGHT: 139 LBS | SYSTOLIC BLOOD PRESSURE: 146 MMHG

## 2018-08-06 DIAGNOSIS — M79.89 RIGHT LEG SWELLING: ICD-10-CM

## 2018-08-06 DIAGNOSIS — N32.81 OVERACTIVE BLADDER: ICD-10-CM

## 2018-08-06 DIAGNOSIS — J45.30 MILD PERSISTENT ASTHMA WITHOUT COMPLICATION: ICD-10-CM

## 2018-08-06 DIAGNOSIS — Z85.41 HISTORY OF CERVICAL CANCER: ICD-10-CM

## 2018-08-06 DIAGNOSIS — Z00.00 ROUTINE HISTORY AND PHYSICAL EXAMINATION OF ADULT: Primary | ICD-10-CM

## 2018-08-06 DIAGNOSIS — L30.9 DERMATITIS: ICD-10-CM

## 2018-08-06 DIAGNOSIS — I10 HYPERTENSION GOAL BP (BLOOD PRESSURE) < 140/90: ICD-10-CM

## 2018-08-06 PROCEDURE — 93971 EXTREMITY STUDY: CPT | Mod: RT

## 2018-08-06 PROCEDURE — 87624 HPV HI-RISK TYP POOLED RSLT: CPT | Performed by: FAMILY MEDICINE

## 2018-08-06 PROCEDURE — 99396 PREV VISIT EST AGE 40-64: CPT | Performed by: FAMILY MEDICINE

## 2018-08-06 PROCEDURE — G0145 SCR C/V CYTO,THINLAYER,RESCR: HCPCS | Performed by: FAMILY MEDICINE

## 2018-08-06 RX ORDER — TOLTERODINE 4 MG/1
4 CAPSULE, EXTENDED RELEASE ORAL DAILY
Qty: 90 CAPSULE | Refills: 3 | Status: SHIPPED | OUTPATIENT
Start: 2018-08-06 | End: 2019-07-22

## 2018-08-06 RX ORDER — BUDESONIDE AND FORMOTEROL FUMARATE DIHYDRATE 80; 4.5 UG/1; UG/1
2 AEROSOL RESPIRATORY (INHALATION) 2 TIMES DAILY
Qty: 3 INHALER | Refills: 3 | Status: SHIPPED | OUTPATIENT
Start: 2018-08-06 | End: 2019-08-04

## 2018-08-06 RX ORDER — TRIAMCINOLONE ACETONIDE 1 MG/G
CREAM TOPICAL
Qty: 30 G | Refills: 1 | Status: SHIPPED | OUTPATIENT
Start: 2018-08-06 | End: 2019-11-20

## 2018-08-06 NOTE — TELEPHONE ENCOUNTER
Spoke with patient she did not have her work calendar in front of her so she will call back tomorrow.     Esmer Blackburn MA

## 2018-08-06 NOTE — LETTER
My Asthma Action Plan  Name: Wendy Francis   YOB: 1955  Date: 8/6/2018   My doctor: Rosanna Lenz MD   My clinic: [facility]        My Control Medicine: Budesonide + formoterol (Symbicort) -  80/4.5 mcg 1 puff twice daily  My Rescue Medicine: Albuterol (Proair/Ventolin/Proventil) inhaler 2 puffs every 4 hours as needed   My Asthma Severity: mild persistent  Avoid your asthma triggers: upper respiratory infections               GREEN ZONE   Good Control    I feel good    No cough or wheeze    Can work, sleep and play without asthma symptoms       Take your asthma control medicine every day.     1. If exercise triggers your asthma, take your rescue medication    15 minutes before exercise or sports, and    During exercise if you have asthma symptoms  2. Spacer to use with inhaler: If you have a spacer, make sure to use it with your inhaler             YELLOW ZONE Getting Worse  I have ANY of these:    I do not feel good    Cough or wheeze    Chest feels tight    Wake up at night   1. Keep taking your Green Zone medications  2. Start taking your rescue medicine:    every 20 minutes for up to 1 hour. Then every 4 hours for 24-48 hours.  3. If you stay in the Yellow Zone for more than 12-24 hours, contact your doctor.  4. If you do not return to the Green Zone in 12-24 hours or you get worse, start taking your oral steroid medicine if prescribed by your provider.           RED ZONE Medical Alert - Get Help  I have ANY of these:    I feel awful    Medicine is not helping    Breathing getting harder    Trouble walking or talking    Nose opens wide to breathe       1. Take your rescue medicine NOW  2. If your provider has prescribed an oral steroid medicine, start taking it NOW  3. Call your doctor NOW  4. If you are still in the Red Zone after 20 minutes and you have not reached your doctor:    Take your rescue medicine again and    Call 911 or go to the emergency room right away    See  your regular doctor within 2 weeks of an Emergency Room or Urgent Care visit for follow-up treatment.          Annual Reminders:  Meet with Asthma Educator,  Flu Shot in the Fall, consider Pneumonia Vaccination for patients with asthma (aged 19 and older).    Pharmacy:    [facility] - [place], [place] - [address] [facility] - [place], [place] - [address] [facility] [place], [place] - [address] [place] [address]                      Asthma Triggers  How To Control Things That Make Your Asthma Worse    Triggers are things that make your asthma worse.  Look at the list below to help you find your triggers and what you can do about them.  You can help prevent asthma flare-ups by staying away from your triggers.      Trigger                                                          What you can do   Cigarette Smoke  Tobacco smoke can make asthma worse. Do not allow smoking in your home, car or around you.  Be sure no one smokes at a child s day care or school.  If you smoke, ask your health care provider for ways to help you quit.  Ask family members to quit too.  Ask your health care provider for a referral to Quit Plan to help you quit smoking, or call 2-247-529-PLAN.     Colds, Flu, Bronchitis  These are common triggers of asthma. Wash your hands often.  Don t touch your eyes, nose or mouth.  Get a flu shot every year.     Dust Mites  These are tiny bugs that live in cloth or carpet. They are too small to see. Wash sheets and blankets in hot water every week.   Encase pillows and mattress in dust mite proof covers.  Avoid having carpet if you can. If you have carpet, vacuum weekly.   Use a dust mask and HEPA vacuum.   Pollen and Outdoor Mold  Some people are allergic to trees, grass, or weed pollen, or molds. Try to keep your windows closed.  Limit time out doors when pollen count is high.   Ask you health care provider  about taking medicine during allergy season.     Animal Dander  Some people are allergic to skin flakes, urine or saliva from pets with fur or feathers. Keep pets with fur or feathers out of your home.    If you can t keep the pet outdoors, then keep the pet out of your bedroom.  Keep the bedroom door closed.  Keep pets off cloth furniture and away from stuffed toys.     Mice, Rats, and Cockroaches  Some people are allergic to the waste from these pests.   Cover food and garbage.  Clean up spills and food crumbs.  Store grease in the refrigerator.   Keep food out of the bedroom.   Indoor Mold  This can be a trigger if your home has high moisture. Fix leaking faucets, pipes, or other sources of water.   Clean moldy surfaces.  Dehumidify basement if it is damp and smelly.   Smoke, Strong Odors, and Sprays  These can reduce air quality. Stay away from strong odors and sprays, such as perfume, powder, hair spray, paints, smoke incense, paint, cleaning products, candles and new carpet.   Exercise or Sports  Some people with asthma have this trigger. Be active!  Ask your doctor about taking medicine before sports or exercise to prevent symptoms.    Warm up for 5-10 minutes before and after sports or exercise.     Other Triggers of Asthma  Cold air:  Cover your nose and mouth with a scarf.  Sometimes laughing or crying can be a trigger.  Some medicines and food can trigger asthma.

## 2018-08-06 NOTE — TELEPHONE ENCOUNTER
Pt referred to [facility] by Rosanna Lenz MD  for right leg swelling.    Pt needs to be scheduled for consult with vascular medicine.  Will route to scheduling to coordinate an appointment at next available.    JONO OcampoN, RN

## 2018-08-06 NOTE — MR AVS SNAPSHOT
After Visit Summary   8/6/2018    Wendy Francis    MRN: [id number]           Patient Information     Date Of Birth          1955        Visit Information        Provider Department      8/6/2018 10:00 AM Rosanna Lenz MD [facility]        Today's Diagnoses     Routine history and physical examination of adult    -  1    Hypertension goal BP (blood pressure) < 140/90        Mild persistent asthma without complication        Mild recurrent major depression (H)        Right leg swelling        Overactive bladder        Dermatitis        History of cervical cancer          Care Instructions    Did you know you can lower your blood pressure with your daily habits?    *Losing 20 pounds of weight lowers blood pressure 5 to 20 points.  *Eating a low-fat diet rich in fruits, vegetables and low-fat dairy lowers blood pressure 8 to 14 points.  *Eating a low-salt diet lowers blood pressure 2 to 8 points.  *Exercising regularly lowers blood pressure 4 to 9 points.  *Reducing alcohol use lowers blood pressure 2 to 4 points.    [facility]    If you have any questions regarding to your visit please contact your care team:       Team Red:   Clinic Hours Telephone Number   Dr. Rosanna Newby, NP   7am-7pm  Monday - Thursday   7am-5pm  Fridays  [phone]  (Appointment scheduling available 24/7)    Questions about your recent visit?   Team Line  [phone]   Urgent Care - [place] and [facility] - 11am-9pm Monday-Friday Saturday-Sunday- 9am-5pm   [place] - 5pm-9pm Monday-Friday Saturday-Sunday- 9am-5pm  [phone] - [place]  [phone] - [place]       What options do I have for a visit other than an office visit? We offer electronic visits (e-visits) and telephone visits, when medically appropriate.  Please check with your medical insurance to see if these types of visits are covered, as you will be  responsible for any charges that are not paid by your insurance.      You can use [employer] (secure electronic communication) to access to your chart, send your primary care provider a message, or make an appointment. Ask a team member how to get started.     For a price quote for your services, please call our Consumer Price Line at [phone] or our Imaging Cost estimation line at [phone] (for imaging tests).    Roxie EMANUEL MA      Preventive Health Recommendations  Female Ages 50 - 64    Yearly exam: See your health care provider every year in order to  o Review health changes.   o Discuss preventive care.    o Review your medicines if your doctor has prescribed any.      Get a Pap test every three years (unless you have an abnormal result and your provider advises testing more often).    If you get Pap tests with HPV test, you only need to test every 5 years, unless you have an abnormal result.     You do not need a Pap test if your uterus was removed (hysterectomy) and you have not had cancer.    You should be tested each year for STDs (sexually transmitted diseases) if you're at risk.     Have a mammogram every 1 to 2 years.    Have a colonoscopy at age 50, or have a yearly FIT test (stool test). These exams screen for colon cancer.      Have a cholesterol test every 5 years, or more often if advised.    Have a diabetes test (fasting glucose) every three years. If you are at risk for diabetes, you should have this test more often.     If you are at risk for osteoporosis (brittle bone disease), think about having a bone density scan (DEXA).    Shots: Get a flu shot each year. Get a tetanus shot every 10 years.    Nutrition:     Eat at least 5 servings of fruits and vegetables each day.    Eat whole-grain bread, whole-wheat pasta and brown rice instead of white grains and rice.    Get adequate Calcium and Vitamin D.     Lifestyle    Exercise at least 150 minutes a week (30 minutes a day, 5 days a week). This  will help you control your weight and prevent disease.    Limit alcohol to one drink per day.    No smoking.     Wear sunscreen to prevent skin cancer.     See your dentist every six months for an exam and cleaning.    See your eye doctor every 1 to 2 years.    It is recommended that you get a vaccination for shingles called Shingrix (given as 2 shots, 2 to 6 months apart), even if you have already had the Zostavax vaccine. Discuss getting the Shingix vaccine from your pharmacist, or schedule an ancillary shot visit here. Some insurances do not cover the cost of these vaccines.               Follow-ups after your visit        Additional Services     VASCULAR SURGERY REFERRAL       Your provider has referred you to: **Vascular  Services [phone] - Vascular - Vascular Medicine & None - Please Order Appropriate Testing   [email or web address]    Please be aware that coverage of these services is subject to the terms and limitations of your health insurance plan.  Call member services at your health plan with any benefit or coverage questions.      Please bring the following with you to your appointment:    (1) Any X-Rays, CTs or MRIs which have been performed.  Contact the facility where they were done to arrange for  prior to your scheduled appointment.    (2) List of current medications   (3) This referral request   (4) Any documents/labs given to you for this referral                  Follow-up notes from your care team     Return in about 1 month (around 9/6/2018) for free pharmacy blood pressure check (walk-in).      Future tests that were ordered for you today     Open Future Orders        Priority Expected Expires Ordered    US Lower Extremity Venous Duplex Right Routine  8/6/2019 8/6/2018            Who to contact     If you have questions or need follow up information about today's clinic visit or your schedule please contact [facility] directly  "at [phone].  Normal or non-critical lab and imaging results will be communicated to you by [employer], letter or phone within 4 business days after the clinic has received the results. If you do not hear from us within 7 days, please contact the clinic through [employer] or phone. If you have a critical or abnormal lab result, we will notify you by phone as soon as possible.  Submit refill requests through [employer] or call your pharmacy and they will forward the refill request to us. Please allow 3 business days for your refill to be completed.          Additional Information About Your Visit        [employer] Information     [employer] gives you secure access to your electronic health record. If you see a primary care provider, you can also send messages to your care team and make appointments. If you have questions, please call your primary care clinic.  If you do not have a primary care provider, please call [phone] and they will assist you.        Care EveryWhere ID     This is your Care EveryWhere ID. This could be used by other organizations to access your [place] medical records  [id number]        Your Vitals Were     Pulse Temperature Respirations Height Pulse Oximetry Breastfeeding?    87 98  F (36.7  C) (Oral) 16 5' 6.5\" (1.689 m) 95% No    BMI (Body Mass Index)                   22.1 kg/m2            Blood Pressure from Last 3 Encounters:   08/06/18 140/90   01/06/18 152/81   07/25/17 136/88    Weight from Last 3 Encounters:   08/06/18 139 lb (63 kg)   01/06/18 130 lb (59 kg)   07/25/17 135 lb (61.2 kg)              We Performed the Following     Pap imaged thin layer screen reflex to HPV if ASCUS - recommend age 25 - 29     VASCULAR SURGERY REFERRAL          Today's Medication Changes          These changes are accurate as of 8/6/18 11:00 AM.  If you have any questions, ask your nurse or doctor.               Start taking these medicines.        Dose/Directions    triamcinolone 0.1 % cream   Commonly " known as:  KENALOG   Used for:  Dermatitis   Started by:  Rosanna Lenz MD        Apply to affected area sparingly twice daily as needed   Quantity:  30 g   Refills:  1         These medicines have changed or have updated prescriptions.        Dose/Directions    budesonide-formoterol 80-4.5 MCG/ACT Inhaler   Commonly known as:  SYMBICORT   This may have changed:  additional instructions   Used for:  Mild persistent asthma without complication   Changed by:  Rosanna Lenz MD        Dose:  2 puff   Inhale 2 puffs into the lungs 2 times daily   Quantity:  3 Inhaler   Refills:  3       tolterodine 4 MG 24 hr capsule   Commonly known as:  DETROL LA   This may have changed:  See the new instructions.   Used for:  Overactive bladder   Changed by:  oRsanna Lenz MD        Dose:  4 mg   Take 1 capsule (4 mg) by mouth daily   Quantity:  90 capsule   Refills:  3            Where to get your medicines      These medications were sent to [facility] [address] [address]     Phone:  [phone]     budesonide-formoterol 80-4.5 MCG/ACT Inhaler    tolterodine 4 MG 24 hr capsule    triamcinolone 0.1 % cream                Primary Care Provider Office Phone # Fax #    Rosanna Lenz [phone] [address] [facility] [address]        Equal Access to Services     [facility] Center[facility]ET[facility] AH: Hadii breonna ku hadasho Soomaali, waaxda luqadaha, qaybta kaalmada adeegyada, rajwinder joy. So [facility] [phone].    ATENCIÓN: Si habla español, tiene a wolff disposición servicios gratuitos de asistencia lingüística. Geovanni al [phone].    We comply with applicable federal civil rights laws and [place] laws. We do not discriminate on the basis of race, color, national origin, age, disability, sex, sexual orientation, or gender identity.            Thank you!     Thank you for choosing [facility]   for your care. Our goal is always to provide you with excellent care. Hearing back from our patients is one way we can continue to improve our services. Please take a few minutes to complete the written survey that you may receive in the mail after your visit with us. Thank you!             Your Updated Medication List - Protect others around you: Learn how to safely use, store and throw away your medicines at www.disposemymeds.org.          This list is accurate as of 8/6/18 11:00 AM.  Always use your most recent med list.                   Brand Name Dispense Instructions for use Diagnosis    albuterol 108 (90 Base) MCG/ACT Inhaler    PROAIR HFA/PROVENTIL HFA/VENTOLIN HFA    1 Inhaler    Inhale 2 puffs into the lungs every 4 hours as needed for shortness of breath / dyspnea    Mild persistent asthma without complication       budesonide-formoterol 80-4.5 MCG/ACT Inhaler    SYMBICORT    3 Inhaler    Inhale 2 puffs into the lungs 2 times daily    Mild persistent asthma without complication       cetirizine 10 MG tablet    zyrTEC     Take 1 tablet by mouth daily as needed for allergies.        Fish Oil 500 MG Caps      Take  by mouth. 1 daily        ipratropium - albuterol 0.5 mg/2.5 mg/3 mL 0.5-2.5 (3) MG/3ML neb solution    DUONEB    30 vial    Take 1 vial (3 mLs) by nebulization every 6 hours as needed for shortness of breath / dyspnea or wheezing    Mild intermittent asthma with exacerbation       MULTI-VITAMIN PO      DAILY        NASACORT ALLERGY 24HR NA           tolterodine 4 MG 24 hr capsule    DETROL LA    90 capsule    Take 1 capsule (4 mg) by mouth daily    Overactive bladder       triamcinolone 0.1 % cream    KENALOG    30 g    Apply to affected area sparingly twice daily as needed    Dermatitis

## 2018-08-06 NOTE — PROGRESS NOTES
SUBJECTIVE:   CC: Wendy Francis is an 63 year old woman  who presents for preventive health visit.     She has insidious onset of right leg swelling, poorly controlled with use of elevation and compression stockings. Hypertension well controlled on current medications without side effects, chest pain, or dyspnea. Patient also has asthma.  Patient has had asthma for years.  Occasional wheezing and shortness of breath are relieved by albuterol.  She has a recurrent itchy rash on her buttocks, treated with lidex 5 years ago.     Healthy Habits:    Do you get at least three servings of calcium containing foods daily (dairy, green leafy vegetables, etc.)? yes    Amount of exercise or daily activities, outside of work: walks / hikes    Problems taking medications regularly No    Medication side effects: No    Have you had an eye exam in the past two years? yes    Do you see a dentist twice per year? yes    Do you have sleep apnea, excessive snoring or daytime drowsiness?no    Today's PHQ-2 Score:   PHQ-2 (  [employer]) 2018   Q1: Little interest or pleasure in doing things 0 1   Q2: Feeling down, depressed or hopeless 0 1   PHQ-2 Score 0 2   Q1: Little interest or pleasure in doing things - -   Q2: Feeling down, depressed or hopeless - -   PHQ-2 Score - -       Abuse: Current or Past(Physical, Sexual or Emotional)- No  Do you feel safe in your environment - Yes    Social History   Substance Use Topics     Smoking status: Never Smoker     Smokeless tobacco: Never Used     Alcohol use 1.0 oz/week      Comment: 1-2 a month     If you drink alcohol do you typically have >3 drinks per day or >7 drinks per week? No                     Reviewed orders with patient.  Reviewed health maintenance and updated orders accordingly - Yes  Patient Active Problem List   Diagnosis     Mild persistent asthma     Overactive bladder     History of cervical cancer     CARDIOVASCULAR SCREENING; LDL GOAL LESS THAN 160      Allergic rhinitis     Advanced directives, counseling/discussion     Hypertension goal BP (blood pressure) < 140/90     Nasal polyposis     Past Surgical History:   Procedure Laterality Date     COLONOSCOPY WITH CO2 INSUFFLATION N/A 4/12/2017    Procedure: COLONOSCOPY WITH CO2 INSUFFLATION;  Surgeon: Anatoly Do MD;  Location: [facility] OR     COLPOSCOPY,LOOP ELECTRD CERVIX EXCIS  2000    Radiation & chemotherapy for cervical cancer. Diagnosed in 2000     HC CORRECT BUNION,SIMPLE  over 10 years ago    ESTEPHANIE     SINUS SURGERY      x 2, Nasal polyps removed       Social History   Substance Use Topics     Smoking status: Never Smoker     Smokeless tobacco: Never Used     Alcohol use 1.0 oz/week      Comment: 1-2 a month     Family History   Problem Relation Age of Onset     Cancer Maternal Grandmother      pelvic     Neurologic Disorder Sister      brain aneurysm     Hypertension Father      Asthma Father      Pacemaker Father      Diabetes Maternal Grandfather      Allergies Brother      Cerebrovascular Disease Sister      C.A.D. No family hx of          Current Outpatient Prescriptions   Medication Sig Dispense Refill     albuterol (PROAIR HFA/PROVENTIL HFA/VENTOLIN HFA) 108 (90 BASE) MCG/ACT Inhaler Inhale 2 puffs into the lungs every 4 hours as needed for shortness of breath / dyspnea 1 Inhaler 4     budesonide-formoterol (SYMBICORT) 80-4.5 MCG/ACT Inhaler Inhale 2 puffs into the lungs 2 times daily 3 Inhaler 3     cetirizine (ZYRTEC) 10 MG tablet Take 1 tablet by mouth daily as needed for allergies.       ipratropium - albuterol 0.5 mg/2.5 mg/3 mL (DUONEB) 0.5-2.5 (3) MG/3ML neb solution Take 1 vial (3 mLs) by nebulization every 6 hours as needed for shortness of breath / dyspnea or wheezing 30 vial 0     MULTI-VITAMIN OR DAILY       Omega-3 Fatty Acids (FISH OIL) 500 MG CAPS Take  by mouth. 1 daily         tolterodine (DETROL LA) 4 MG 24 hr capsule Take 1 capsule (4 mg) by mouth daily 90 capsule 3      triamcinolone (KENALOG) 0.1 % cream Apply to affected area sparingly twice daily as needed 30 g 1     Triamcinolone Acetonide (NASACORT ALLERGY 24HR NA)        [DISCONTINUED] budesonide-formoterol (SYMBICORT) 80-4.5 MCG/ACT Inhaler Inhale 2 puffs into the lungs 2 times daily Need to see MD for further refills 1 Inhaler 0     Allergies   Allergen Reactions     Citalopram      Thinking changes     Flonase [Fluticasone Propionate]      Sulfa Drugs Rash     Sulfasalazine Rash       Patient over age 50, mutual decision to screen reflected in health maintenance.    Pertinent mammograms are reviewed under the imaging tab.  History of abnormal Pap smear:   YES - other categories - see link Cervical Cytology Screening Guidelines  Last 3 Pap and HPV Results:   PAP / HPV Latest Ref Rng & Units 11/7/2016 6/2/2015 4/29/2014   PAP - UNSAT NIL NIL   HPV 16 DNA NEG - Negative -   HPV 18 DNA NEG - Negative -   OTHER HR HPV NEG - Negative -     PAP / HPV Latest Ref Rng & Units 11/7/2016 6/2/2015 4/29/2014   PAP - UNSAT NIL NIL   HPV 16 DNA NEG - Negative -   HPV 18 DNA NEG - Negative -   OTHER HR HPV NEG - Negative -     Reviewed and updated as needed this visit by clinical staff  Tobacco  Allergies  Meds  Problems  Med Hx  Surg Hx  Fam Hx  Soc Hx          Reviewed and updated as needed this visit by Provider  Allergies  Meds  Problems  Med Hx  Surg Hx  Fam Hx        Past Medical History:   Diagnosis Date     Allergic rhinitis     molds, dust     Atrophic vaginitis      Cervical cancer (H) 4/10/2000    chemo and radiation, IB-II squamous cell, positive nodes     Diverticulosis of colon 6/2004     Hypertension goal BP (blood pressure) < 140/90      Mild persistent asthma      Mild recurrent major depression (H)      Nasal polyps      Overactive bladder      Sensorineural hearing losses      Tinnitus      Varicose vein of leg     No surgery        ROS:  CONSTITUTIONAL: NEGATIVE for fever, chills, change in  "weight  INTEGUMENTARY/SKIN: see above   EYES: NEGATIVE for vision changes or irritation  ENT: NEGATIVE for ear, mouth and throat problems  RESP: NEGATIVE for significant cough or SOB  BREAST: NEGATIVE for masses, tenderness or discharge  CV: see above   GI: NEGATIVE for nausea, abdominal pain, heartburn, or change in bowel habits   menopausal female: see above   MUSCULOSKELETAL: NEGATIVE for significant arthralgias or myalgia  NEURO: NEGATIVE for weakness, dizziness or paresthesias  PSYCHIATRIC: NEGATIVE for changes in mood or affect     OBJECTIVE:   /90  Pulse 87  Temp 98  F (36.7  C) (Oral)  Resp 16  Ht 5' 6.5\" (1.689 m)  Wt 139 lb (63 kg)  SpO2 95%  Breastfeeding? No  BMI 22.1 kg/m2  EXAM:  GENERAL: healthy, alert and no distress  EYES: Eyes grossly normal to inspection, PERRL and conjunctivae and sclerae normal  HENT: ear canals and TM's normal, nose and mouth without ulcers or lesions  NECK: no adenopathy, no asymmetry, masses, or scars and thyroid normal to palpation  RESP: lungs clear to auscultation - no rales, rhonchi or wheezes  BREAST: normal without masses, tenderness or nipple discharge and no palpable axillary masses or adenopathy  CV: regular rate and rhythm, normal S1 S2, no S3 or S4, no murmur, click or rub, no peripheral edema and peripheral pulses strong  ABDOMEN: soft, nontender, no hepatosplenomegaly, no masses and bowel sounds normal   (female): normal external genitalia; post radiation changes and atrophy of vaginal wall and cervix; no adnexal or uterine masses   MS: no gross musculoskeletal defects noted, no edema  SKIN: dry, pink rash in gluteal crease with a few pink satellite papules   NEURO: Normal strength and tone, mentation intact and speech normal  PSYCH: mentation appears normal, affect normal/bright    Diagnostic Test Results:  Results for orders placed or performed in visit on 07/26/17   Enteric Bacteria and Virus Panel by MARY Stool   Result Value Ref Range    " Campylobacter group by MARY Not Detected NDET    Salmonella species by MARY Not Detected NDET    Shigella species by MARY Not Detected NDET    Vibrio group by MARY Not Detected NDET    Rotavirus A by MARY Not Detected NDET    Shiga toxin 1 gene by MARY Not Detected NDET    Shiga toxin 2 gene by MARY Not Detected NDET    Norovirus I and II by MARY Not Detected NDET    Yersinia enterocolitica by MARY Not Detected NDET    Enteric pathogen comment       Testing performed by multiplexed, qualitative PCR using the [employer]ene   Enteric Pathogens Nucleic Acid Test. Results should not be used as the sole   basis for diagnosis, treatment, or other patient management decisions.   Positive results do not rule out co-infection with other organisms that are not   detected by this test, and may not be the sole or definitive cause of patient   illness.   Negative results in the setting of clinical illness compatible with   gastroenteritis may be due to infection by pathogens that are not detected by   this test or non-infectious causes such as ulcerative colitis, irritable bowel   syndrome, or Crohn's disease.   Note: Shiga toxin producing E. coli (STEC) typically harbor one or both genes   that encode for Shiga toxins 1 and 2.     Clostridium difficile toxin B PCR   Result Value Ref Range    Specimen Description Feces     C Diff Toxin B PCR  NEG     Negative  Negative: Clostridium difficile target DNA sequences NOT detected, presumed   negative for Clostridium difficile toxin B or the number of bacteria present   may be below the limit of detection for the test.   FDA approved assay performed using [employer] GeneXpert real-time PCR.   A negative result does not exclude actual disease due to Clostridium difficile   and may be due to improper collection, handling and storage of the specimen or   the number of organisms in the specimen is below the detection limit of the   assay.         ASSESSMENT/PLAN:   (Z00.00) Routine history and  "physical examination of adult  (primary encounter diagnosis)  Plan: Pap imaged thin layer screen reflex to HPV if         ASCUS - recommend age 25 - 29          (I10) Hypertension goal BP (blood pressure) < 140/90  Plan: Follow up ancillary blood pressure recheck in one month to consider medication     (J45.30) Mild persistent asthma without complication  Comment: Well controlled with medications without side effects.   Plan: budesonide-formoterol (SYMBICORT) 80-4.5         MCG/ACT Inhaler          (M79.89) Right leg swelling  Plan: US Lower Extremity Venous Duplex Right,         VASCULAR SURGERY REFERRAL        Lower extremity elevation, low-salt diet, compression stockings, and call or return to clinic as needed      (N32.81) Overactive bladder  Comment: Well controlled with medications without side effects.   Plan: tolterodine (DETROL LA) 4 MG 24 hr capsule          (L30.9) Dermatitis  Comment: gluteal crease   Plan: triamcinolone (KENALOG) 0.1 % cream        Call or return to clinic as needed if these symptoms worsen or fail to improve as anticipated.     (Z85.41) History of cervical cancer  Plan: Pap smear yearly     COUNSELING:   Reviewed preventive health counseling, as reflected in patient instructions  Special attention given to:        Regular exercise       Healthy diet/nutrition       Osteoporosis Prevention/Bone Health       Colon cancer screening       Consider Hep C screening for patients born between 1945 and 1965       HIV screeninx in teen years, 1x in adult years, and at intervals if high risk       The ASCVD Risk score (Shukri LEROY Jr, et al., 2013) failed to calculate for the following reasons:    Cannot find a previous HDL lab    Cannot find a previous total cholesterol lab    BP Readings from Last 1 Encounters:   18 146/90     Estimated body mass index is 22.1 kg/(m^2) as calculated from the following:    Height as of this encounter: 5' 6.5\" (1.689 m).    Weight as of this encounter: 139 " lb (63 kg).           reports that she has never smoked. She has never used smokeless tobacco.      Counseling Resources:  ATP IV Guidelines  Pooled Cohorts Equation Calculator  Breast Cancer Risk Calculator  FRAX Risk Assessment  ICSI Preventive Guidelines  Dietary Guidelines for Americans, 2010  USDA's MyPlate  ASA Prophylaxis  Lung CA Screening    Rosanna Lenz MD  [facility]

## 2018-08-06 NOTE — LETTER
My Depression Action Plan  Name: Wendy Francis   Date of Birth 1955  Date: 8/6/2018    My doctor: Rosanna Lenz   My clinic: [address]  [place] [address]  [phone]          GREEN    ZONE   Good Control    What it looks like:     Things are going generally well. You have normal up s and down s. You may even feel depressed from time to time, but bad moods usually last less than a day.   What you need to do:  1. Continue to care for yourself (see self care plan)  2. Check your depression survival kit and update it as needed  3. Follow your physician s recommendations including any medication.  4. Do not stop taking medication unless you consult with your physician first.           YELLOW         ZONE Getting Worse    What it looks like:     Depression is starting to interfere with your life.     It may be hard to get out of bed; you may be starting to isolate yourself from others.    Symptoms of depression are starting to last most all day and this has happened for several days.     You may have suicidal thoughts but they are not constant.   What you need to do:     1. Call your care team, your response to treatment will improve if you keep your care team informed of your progress. Yellow periods are signs an adjustment may need to be made.     2. Continue your self-care, even if you have to fake it!    3. Talk to someone in your support network    4. Open up your depression survival kit           RED    ZONE Medical Alert - Get Help    What it looks like:     Depression is seriously interfering with your life.     You may experience these or other symptoms: You can t get out of bed most days, can t work or engage in other necessary activities, you have trouble taking care of basic hygiene, or basic responsibilities, thoughts of suicide or death that will not go away, self-injurious behavior.     What you need to do:  1. Call your care team and  request a same-day appointment. If they are not available (weekends or after hours) call your local crisis line, emergency room or 911.            Depression Self Care Plan / Survival Kit    Self-Care for Depression  Here s the deal. Your body and mind are really not as separate as most people think.  What you do and think affects how you feel and how you feel influences what you do and think. This means if you do things that people who feel good do, it will help you feel better.  Sometimes this is all it takes.  There is also a place for medication and therapy depending on how severe your depression is, so be sure to consult with your medical provider and/ or Behavioral Health Consultant if your symptoms are worsening or not improving.     In order to better manage my stress, I will:    Exercise  Get some form of exercise, every day. This will help reduce pain and release endorphins, the  feel good  chemicals in your brain. This is almost as good as taking antidepressants!  This is not the same as joining a gym and then never going! (they count on that by the way ) It can be as simple as just going for a walk or doing some gardening, anything that will get you moving.      Hygiene   Maintain good hygiene (Get out of bed in the morning, Make your bed, Brush your teeth, Take a shower, and Get dressed like you were going to work, even if you are unemployed).  If your clothes don't fit try to get ones that do.    Diet  I will strive to eat foods that are good for me, drink plenty of water, and avoid excessive sugar, caffeine, alcohol, and other mood-altering substances.  Some foods that are helpful in depression are: complex carbohydrates, B vitamins, flaxseed, fish or fish oil, fresh fruits and vegetables.    Psychotherapy  I agree to participate in Individual Therapy (if recommended).    Medication  If prescribed medications, I agree to take them.  Missing doses can result in serious side effects.  I understand that  drinking alcohol, or other illicit drug use, may cause potential side effects.  I will not stop my medication abruptly without first discussing it with my provider.    Staying Connected With Others  I will stay in touch with my friends, family members, and my primary care provider/team.    Use your imagination  Be creative.  We all have a creative side; it doesn t matter if it s oil painting, sand castles, or mud pies! This will also kick up the endorphins.    Witness Beauty  (AKA stop and smell the roses) Take a look outside, even in mid-winter. Notice colors, textures. Watch the squirrels and birds.     Service to others  Be of service to others.  There is always someone else in need.  By helping others we can  get out of ourselves  and remember the really important things.  This also provides opportunities for practicing all the other parts of the program.    Humor  Laugh and be silly!  Adjust your TV habits for less news and crime-drama and more comedy.    Control your stress  Try breathing deep, massage therapy, biofeedback, and meditation. Find time to relax each day.     My support system    Clinic Contact:  Phone number:    Contact 1:  Phone number:    Contact 2:  Phone number:    [place]:  Phone number:    Therapist:  Phone number:    Local crisis center:    Phone number:    Other community support:  Phone number:

## 2018-08-07 ASSESSMENT — PATIENT HEALTH QUESTIONNAIRE - PHQ9: SUM OF ALL RESPONSES TO PHQ QUESTIONS 1-9: 0

## 2018-08-07 ASSESSMENT — ASTHMA QUESTIONNAIRES: ACT_TOTALSCORE: 25

## 2018-08-08 LAB
COPATH REPORT: NORMAL
PAP: NORMAL

## 2018-08-13 LAB
FINAL DIAGNOSIS: NORMAL
HPV HR 12 DNA CVX QL NAA+PROBE: NEGATIVE
HPV16 DNA SPEC QL NAA+PROBE: NEGATIVE
HPV18 DNA SPEC QL NAA+PROBE: NEGATIVE
SPECIMEN DESCRIPTION: NORMAL
SPECIMEN SOURCE CVX/VAG CYTO: NORMAL

## 2018-08-15 ENCOUNTER — RADIANT APPOINTMENT (OUTPATIENT)
Dept: CT IMAGING | Facility: CLINIC | Age: 63
End: 2018-08-15
Attending: INTERNAL MEDICINE
Payer: COMMERCIAL

## 2018-08-15 ENCOUNTER — OFFICE VISIT (OUTPATIENT)
Dept: OTHER | Facility: CLINIC | Age: 63
End: 2018-08-15
Attending: INTERNAL MEDICINE
Payer: COMMERCIAL

## 2018-08-15 VITALS
OXYGEN SATURATION: 95 % | HEART RATE: 86 BPM | BODY MASS INDEX: 21.72 KG/M2 | SYSTOLIC BLOOD PRESSURE: 131 MMHG | WEIGHT: 136.6 LBS | DIASTOLIC BLOOD PRESSURE: 86 MMHG

## 2018-08-15 DIAGNOSIS — Z85.41 HISTORY OF CERVICAL CANCER: Primary | ICD-10-CM

## 2018-08-15 DIAGNOSIS — Z85.41 HISTORY OF CERVICAL CANCER: ICD-10-CM

## 2018-08-15 PROCEDURE — 74177 CT ABD & PELVIS W/CONTRAST: CPT | Mod: TC

## 2018-08-15 PROCEDURE — 99204 OFFICE O/P NEW MOD 45 MIN: CPT | Mod: ZP | Performed by: INTERNAL MEDICINE

## 2018-08-15 PROCEDURE — G0463 HOSPITAL OUTPT CLINIC VISIT: HCPCS

## 2018-08-15 RX ORDER — IOPAMIDOL 755 MG/ML
67 INJECTION, SOLUTION INTRAVASCULAR ONCE
Status: COMPLETED | OUTPATIENT
Start: 2018-08-15 | End: 2018-08-15

## 2018-08-15 RX ADMIN — IOPAMIDOL 67 ML: 755 INJECTION, SOLUTION INTRAVASCULAR at 14:25

## 2018-08-15 NOTE — NURSING NOTE
"Wendy Francis is a 63 year old female who presents for:  Chief Complaint   Patient presents with     Consult     Pt referred by Rosanna Lenz MD  for right leg swelling. LEV US done 8/6/18 in epic.        Vitals:    Vitals:    08/15/18 1028 08/15/18 1030   BP: 142/90 131/86   BP Location: Right arm Left arm   Patient Position: Chair Chair   Cuff Size: Adult Regular Adult Regular   Pulse: 85 86   SpO2: 95%    Weight: 136 lb 9.6 oz (62 kg)        BMI:  Estimated body mass index is 21.72 kg/(m^2) as calculated from the following:    Height as of 8/6/18: 5' 6.5\" (1.689 m).    Weight as of this encounter: 136 lb 9.6 oz (62 kg).    Pain Score:  Data Unavailable        Cinthya Bryant MA      "

## 2018-08-15 NOTE — MR AVS SNAPSHOT
After Visit Summary   8/15/2018    Wendy Francis    MRN: [id number]           Patient Information     Date Of Birth          1955        Visit Information        Provider Department      8/15/2018 10:20 AM Wally Gu MD [facility] [place]      Today's Diagnoses     History of cervical cancer    -  1       Follow-ups after your visit        Your next 10 appointments already scheduled     Aug 15, 2018  2:30 PM CDT   (Arrive by 2:00 PM)   CT ABDOMEN PELVIS W CONTRAST with BECT1   [facility]    [address] [address]   [phone]           Please bring any scans or X-rays taken at other hospitals, if similar tests were done. Also bring a list of your medicines, including vitamins, minerals and over-the-counter drugs. It is safest to leave personal items at home.  Be sure to tell your doctor:   If you have any allergies.   If there s any chance you are pregnant.   If you are breastfeeding.  How to prepare:   Do not eat or drink for 2 hours before your exam. If you need to take medicine, you may take it with small sips of water. (We may ask you to take liquid medicine as well.)   Please wear loose clothing, such as a sweat suit or jogging clothes. Avoid snaps, zippers and other metal. We may ask you to undress and put on a hospital gown.  Please arrive 30 minutes early for your CT. Once in the department you might be asked to drink water 15-20 minutes prior to your exam.  If indicated you may be asked to drink an oral contrast in advance of your CT.  If this is the case, the imaging team will let you know or be in contact with you prior to your appointment  Patients over 70 or patients with diabetes or kidney problems:   If you haven t had a blood test (creatinine test) within the last 30 days, the Cardiologist/Radiologist may require you to get this test prior to  your exam.  If you have diabetes:   Continue to take your metformin medication on the day of your exam  If you have any questions, please call the Imaging Department where you will have your exam.            Aug 21, 2018 10:50 AM CDT   Return Visit with Wally Gu MD   [facility] ([facility])    [address]  [place] [address] [phone]              Who to contact     If you have questions or need follow up information about today's clinic visit or your schedule please contact [facility] directly at [phone].  Normal or non-critical lab and imaging results will be communicated to you by MyChart, letter or phone within 4 business days after the clinic has received the results. If you do not hear from us within 7 days, please contact the clinic through Aframehart or phone. If you have a critical or abnormal lab result, we will notify you by phone as soon as possible.  Submit refill requests through Zounds or call your pharmacy and they will forward the refill request to us. Please allow 3 business days for your refill to be completed.          Additional Information About Your Visit        [email or web address] Information     Zounds gives you secure access to your electronic health record. If you see a primary care provider, you can also send messages to your care team and make appointments. If you have questions, please call your primary care clinic.  If you do not have a primary care provider, please call [phone] and they will assist you.        Care EveryWhere ID     This is your Care EveryWhere ID. This could be used by other organizations to access your [place] medical records  [id number]        Your Vitals Were     Pulse Pulse Oximetry Breastfeeding? BMI (Body Mass Index)          86 95% No 21.72 kg/m2         Blood Pressure from Last 3 Encounters:   08/15/18 131/86   08/06/18 146/90    01/06/18 152/81    Weight from Last 3 Encounters:   08/15/18 136 lb 9.6 oz (62 kg)   08/06/18 139 lb (63 kg)   01/06/18 130 lb (59 kg)               Primary Care Provider Office Phone # Fax #    Rosanna Lenz [phone] [address]  [facility] [address]        Equal Access to Services     [facility] Campus[facility]ETZY : Hadii aad ku hadasho Soomaali, waaxda luqadaha, qaybta kaalmada adeegyada, waxay idiin hayaan adeeg khginnysh la'aan . So [facility] [phone].    ATENCIÓN: Si habla español, tiene a wolff disposición servicios gratuitos de asistencia lingüística. Llame al [phone].    We comply with applicable federal civil rights laws and [place] laws. We do not discriminate on the basis of race, color, national origin, age, disability, sex, sexual orientation, or gender identity.            Thank you!     Thank you for choosing [facility] [place]  for your care. Our goal is always to provide you with excellent care. Hearing back from our patients is one way we can continue to improve our services. Please take a few minutes to complete the written survey that you may receive in the mail after your visit with us. Thank you!             Your Updated Medication List - Protect others around you: Learn how to safely use, store and throw away your medicines at www.disposemymeds.org.          This list is accurate as of 8/15/18  1:56 PM.  Always use your most recent med list.                   Brand Name Dispense Instructions for use Diagnosis    albuterol 108 (90 Base) MCG/ACT inhaler    PROAIR HFA/PROVENTIL HFA/VENTOLIN HFA    1 Inhaler    Inhale 2 puffs into the lungs every 4 hours as needed for shortness of breath / dyspnea    Mild persistent asthma without complication       budesonide-formoterol 80-4.5 MCG/ACT Inhaler    SYMBICORT    3 Inhaler    Inhale 2 puffs into the lungs 2 times daily    Mild persistent asthma without complication       cetirizine 10 MG tablet    zyrTEC     Take  1 tablet by mouth daily as needed for allergies.        Fish Oil 500 MG Caps      Take  by mouth. 1 daily        ipratropium - albuterol 0.5 mg/2.5 mg/3 mL 0.5-2.5 (3) MG/3ML neb solution    DUONEB    30 vial    Take 1 vial (3 mLs) by nebulization every 6 hours as needed for shortness of breath / dyspnea or wheezing    Mild intermittent asthma with exacerbation       MULTI-VITAMIN PO      DAILY        NASACORT ALLERGY 24HR NA           tolterodine 4 MG 24 hr capsule    DETROL LA    90 capsule    Take 1 capsule (4 mg) by mouth daily    Overactive bladder       triamcinolone 0.1 % cream    KENALOG    30 g    Apply to affected area sparingly twice daily as needed    Dermatitis

## 2018-08-15 NOTE — PROGRESS NOTES
Vascular Medicine Consultation     Chief Complaint   Right leg swelling    Date of Admission:  (Not on file)    Wendy Franics is a 63 year old female who was admitted on (Not on file). I was asked to see the patient for right leg swelling.    Code Status    Full code    Reason for Consult   Reason for consult: I was asked by PCP to evaluate this patient for right leg swelling.    Primary Care Physician   Rosanna Lenz      History is obtained from the patient    History of Present Illness   Wendy Francis is a 63 year old female who presents with sudden onset of right leg swelling patient noticed that her right leg is getting swollen, the condition started about 3 weeks ago and it is progressive the swelling is present all through the day gets a little bit better in the morning but the leg will remain swollen the whole day from the ankle all the way up to the hip, patient recently had Doppler ultrasound which showed no evidence of blood clotting patient denies any history of pain.  There is some discomfort on external rotation and hip flexion  Patient mentioned that with leg elevation she will get some relief  Patient has no prior history of varicose veins, DVT, no family history of DVT or varicose veins  Patient has a history of cervical cancer which was diagnosed in the year 2000 patient had surgery and radiation therapy back in 2000 for follow-up which was done recently was completely normal yet she did have any sort of imaging to the abdominal pelvis  Patient cancer screening is up-to-date  Patient denies any history of constitutional symptoms, no loss of weight, loss of appetite, no abdominal pain, no nausea vomiting fever night sweats  Patient wears compression stockings daily there are 15-20 mmHg and they are knee-high in spite of wearing compression stockings patient did develop right leg swelling  Patient denies any history of claudication rest pain or leg pain    Past Medical History   I have  reviewed this patient's medical history and updated it with pertinent information if needed.   Past Medical History:   Diagnosis Date     Allergic rhinitis     molds, dust     Atrophic vaginitis      Cervical cancer (H) 4/10/2000    chemo and radiation, IB-II squamous cell, positive nodes     Diverticulosis of colon 6/2004     Hypertension goal BP (blood pressure) < 140/90      Mild persistent asthma      Mild recurrent major depression (H)      Nasal polyps      Overactive bladder      Sensorineural hearing losses      Tinnitus      Varicose vein of leg     No surgery       Past Surgical History   I have reviewed this patient's surgical history and updated it with pertinent information if needed.  Past Surgical History:   Procedure Laterality Date     COLONOSCOPY WITH CO2 INSUFFLATION N/A 4/12/2017    Procedure: COLONOSCOPY WITH CO2 INSUFFLATION;  Surgeon: Anatoly Do MD;  Location: [facility] OR     COLPOSCOPY,LOOP ELECTRD CERVIX EXCIS  2000    Radiation & chemotherapy for cervical cancer. Diagnosed in 2000     HC CORRECT BUNION,SIMPLE  over 10 years ago    ESTEPHANIE     SINUS SURGERY      x 2, Nasal polyps removed       Prior to Admission Medications   Cannot display prior to admission medications because the patient has not been admitted in this contact.     Allergies   Allergies   Allergen Reactions     Citalopram      Thinking changes     Flonase [Fluticasone Propionate]      Sulfa Drugs Rash     Sulfasalazine Rash       Social History   I have reviewed this patient's social history and updated it with pertinent information if needed. Wendy A Shupien  reports that she has never smoked. She has never used smokeless tobacco. She reports that she drinks about 1.0 oz of alcohol per week  She reports that she does not use illicit drugs.    Family History   I have reviewed this patient's family history and updated it with pertinent information if needed.   Family History   Problem Relation Age of Onset     Cancer  Maternal Grandmother      pelvic     Neurologic Disorder Sister      brain aneurysm     Hypertension Father      Asthma Father      Pacemaker Father      Diabetes Maternal Grandfather      Allergies Brother      Cerebrovascular Disease Sister      C.A.D. No family hx of        Review of Systems   The 10 point Review of Systems is negative other than noted in the HPI or here.     Physical Exam       BP: 131/86 Pulse: 86     SpO2: 95 %      Vital Signs with Ranges  Pulse:  [85-86] 86  BP: (131-142)/(86-90) 131/86  SpO2:  [95 %] 95 %  136 lbs 9.6 oz    Constitutional: awake, alert, cooperative, no apparent distress, and appears stated age  Eyes: Lids and lashes normal, pupils equal, round and reactive to light, extra ocular muscles intact, sclera clear, conjunctiva normal  ENT: normocepalic, without obvious abnormality, oropharynx pink and moist  Hematologic / Lymphatic: no lymphadenopathy  Respiratory: No increased work of breathing, good air exchange, clear to auscultation bilaterally, no crackles or wheezing  Cardiovascular: regular rate and rhythm, normal S1 and S2 and no murmur noted  GI: Normal bowel sounds, soft, non-distended, non-tender  Skin: no redness, warmth, or swelling, no rashes and no lesions  Musculoskeletal: There is no redness, warmth, or swelling of the joints.  Full range of motion noted.  Motor strength is 5 out of 5 all extremities bilaterally.  Tone is normal.  Neurologic: Awake, alert, oriented to name, place and time.  Cranial nerves II-XII are grossly intact.  Motor is 5 out of 5 bilaterally.    Neuropsychiatric:  Normal affect, memory, insight.  Pulses: Palpable DP and PT pulses bilateral and equal +2  Leg swelling, nonpitting edema  . No carotid bruits appreciated.     Data   Most Recent 3 CBC's:  Recent Labs   Lab Test  04/05/17   0752   HGB  15.2     Most Recent 3 BMP's:  Recent Labs   Lab Test  06/02/15   0810  04/02/14   0856  04/06/12   1002   GLC  90  109*  89     Most Recent 2  LFT's:No lab results found.  Most Recent 3 INR's:No lab results found.  Most Recent 3 Troponin's:No lab results found.  Most Recent 3 BNP's:No lab results found.  Most Recent D-dimer:No lab results found.  Most Recent Hemoglobin A1c:No lab results found.  Most Recent 6 glucoses:  Recent Labs   Lab Test  06/02/15   0810  04/02/14   0856  04/06/12   1002   GLC  90  109*  89     Most Recent Urinalysis:No lab results found.  Most Recent Anemia Panel:  Recent Labs   Lab Test  04/05/17   0752   HGB  15.2         Assessment & Plan   (Z85.41) History of cervical cancer  (primary encounter diagnosis)  Comment: Given the fact that patient had acute onset of right leg swelling and given her prior history of cervical cancer and post surgical radiation therapy will obtain CT abdomen pelvis to rule out any recurrence, masses for stenotic lesions secondary to radiation vasculitis  Plan: CT Abdomen Pelvis w Contrast              Summary: We will see the patient once test results are available, CT scan of the abdomen pelvis panned out as being negative then patient might need venous insufficiency study right lower extremity    Wally Gu MD

## 2018-08-21 ENCOUNTER — TELEPHONE (OUTPATIENT)
Dept: OTHER | Facility: CLINIC | Age: 63
End: 2018-08-21

## 2018-08-21 ENCOUNTER — OFFICE VISIT (OUTPATIENT)
Dept: OTHER | Facility: CLINIC | Age: 63
End: 2018-08-21
Attending: INTERNAL MEDICINE
Payer: COMMERCIAL

## 2018-08-21 VITALS
DIASTOLIC BLOOD PRESSURE: 82 MMHG | BODY MASS INDEX: 21.62 KG/M2 | WEIGHT: 136 LBS | SYSTOLIC BLOOD PRESSURE: 138 MMHG | OXYGEN SATURATION: 94 % | HEART RATE: 83 BPM

## 2018-08-21 DIAGNOSIS — Z85.41 HISTORY OF CERVICAL CANCER: Primary | ICD-10-CM

## 2018-08-21 DIAGNOSIS — N83.8 OVARIAN MASS, LEFT: ICD-10-CM

## 2018-08-21 DIAGNOSIS — R16.0 LIVER MASS: ICD-10-CM

## 2018-08-21 PROCEDURE — 99214 OFFICE O/P EST MOD 30 MIN: CPT | Mod: ZP | Performed by: INTERNAL MEDICINE

## 2018-08-21 PROCEDURE — G0463 HOSPITAL OUTPT CLINIC VISIT: HCPCS

## 2018-08-21 NOTE — TELEPHONE ENCOUNTER
"\"We will refer the patient to primary care physician regarding her left adnexal mass cystic mass, complex cyst measuring 6.3 cm in its widest dimension for further management.\"    Will route to referring PCP.     Melanie DE LA CRUZN, RN    "

## 2018-08-21 NOTE — PROGRESS NOTES
Vascular Medicine Progress Note     Wendy Francis is a 63 year old female patient is here for follow-up on CT scan results    Interval History   Patient leg swelling is better once she started using the compression stockings yet she does have residual left lower extremity swelling CT scan results shown below it and show any mass-effect on the right lower extremity and no evidence of IVC occlusion    Physical Exam       BP: 138/82 Pulse: 83     SpO2: 94 %      Vitals:    08/21/18 1041   Weight: 136 lb (61.7 kg)     Vital Signs with Ranges  Pulse:  [83] 83  BP: (138)/(82) 138/82  SpO2:  [94 %] 94 %  [unfilled]    Constitutional: awake, alert, cooperative, no apparent distress, and appears stated age  Eyes: Lids and lashes normal, pupils equal, round and reactive to light, extra ocular muscles intact, sclera clear, conjunctiva normal  ENT: normocepalic, without obvious abnormality, oropharynx pink and moist  Hematologic / Lymphatic: no lymphadenopathy  Respiratory: No increased work of breathing, good air exchange, clear to auscultation bilaterally, no crackles or wheezing  Cardiovascular: regular rate and rhythm, normal S1 and S2 and no murmur noted  GI: Normal bowel sounds, soft, non-distended, non-tender  Skin: no redness, warmth, or swelling, no rashes and no lesions  Musculoskeletal: There is no redness, warmth, or swelling of the joints.  Full range of motion noted.  Motor strength is 5 out of 5 all extremities bilaterally.  Tone is normal.  Neurologic: Awake, alert, oriented to name, place and time.  Cranial nerves II-XII are grossly intact.  Motor is 5 out of 5 bilaterally.    Neuropsychiatric:  Normal affect, memory, insight.  Pulses: As previous exam  . No carotid bruits appreciated.     Medications         Data   No results found for this or any previous visit (from the past 24 hour(s)).  FINDINGS:  No evidence of diverticulitis or small bowel obstruction.  Unremarkable appendix. There is a 6.3  cm complex left ovarian/adnexal  cystic lesion. Cystic neoplasm cannot be excluded. Short-term  ultrasound followup recommended. No evidence of diverticulitis or  small bowel obstruction. Pelvic surgical clips. Appendix not seen.  Low-attenuation subcentimeter liver lesion(s) compatible with benign  cysts or other benign lesions. No specific evaluation or follow-up is  recommended in a low risk patient. On image 27, there is a 1 cm  enhancing liver lesion. This could represent a hemangioma but is  incompletely evaluated. Depending on the clinical scenario, this could  be further evaluated with MRI versus followed up with CT. No  compression of the inferior vena cava. Nothing else acute is seen in  the upper abdominal organs.          IMPRESSION:  1. 6.3 cm complex left ovarian/adnexal cystic lesion. Ovarian neoplasm  cannot be excluded. Short-term ultrasound followup recommended.  2. 1 cm indeterminate liver lesion, as above.  Assessment & Plan   No diagnosis found.      Summary: Right lower extremity swelling which showed response to compression stockings will continue with compression stockings and will see patient 1 month if swelling still persists and patient will need venogram    We will refer the patient to primary care physician regarding her left adnexal mass cystic mass, complex cyst measuring 6.3 cm in its widest dimension for further management    Wally Gu MD

## 2018-08-21 NOTE — TELEPHONE ENCOUNTER
Please call patient: your recent imaging test showed a mass of the left ovary and liver. Call the imaging center at [phone] or  [phone] to schedule an ultrasound and follow-up with me to discuss.  Rosanna Lenz MD

## 2018-08-21 NOTE — MR AVS SNAPSHOT
After Visit Summary   8/21/2018    Wendy Francis    MRN: [id number]           Patient Information     Date Of Birth          1955        Visit Information        Provider Department      8/21/2018 10:50 AM Wally Gu MD [facility]      Today's Diagnoses     History of cervical cancer    -  1       Follow-ups after your visit        Additional Services     Follow-Up with Vascular Medicine         These are internal orders to be used by  [facility] providers only.                  Your next 10 appointments already scheduled     Sep 28, 2018 10:50 AM CDT   Return Visit with Wally Gu MD   [facility] ([facility])    [address]  [facility] [address] [phone]              Future tests that were ordered for you today     Open Future Orders        Priority Expected Expires Ordered    Follow-Up with Vascular Medicine Routine 9/21/2018 8/21/2019 8/21/2018    US Pelvic Complete with Transvaginal Routine 8/21/2018 2/17/2019 8/21/2018            Who to contact     If you have questions or need follow up information about today's clinic visit or your schedule please contact [facility] directly at [phone].  Normal or non-critical lab and imaging results will be communicated to you by MyChart, letter or phone within 4 business days after the clinic has received the results. If you do not hear from us within 7 days, please contact the clinic through MyChart or phone. If you have a critical or abnormal lab result, we will notify you by phone as soon as possible.  Submit refill requests through Keisense or call your pharmacy and they will forward the refill request to us. Please allow 3 business days for your refill to be completed.          Additional Information About Your Visit         Vigilix Information     Vigilix gives you secure access to your electronic health record. If you see a primary care provider, you can also send messages to your care team and make appointments. If you have questions, please call your primary care clinic.  If you do not have a primary care provider, please call [phone] and they will assist you.        Care EveryWhere ID     This is your Care EveryWhere ID. This could be used by other organizations to access your [place] medical records  [id number]        Your Vitals Were     Pulse Pulse Oximetry Breastfeeding? BMI (Body Mass Index)          83 94% No 21.62 kg/m2         Blood Pressure from Last 3 Encounters:   08/21/18 138/82   08/15/18 131/86   08/06/18 146/90    Weight from Last 3 Encounters:   08/21/18 136 lb (61.7 kg)   08/15/18 136 lb 9.6 oz (62 kg)   08/06/18 139 lb (63 kg)               Primary Care Provider Office Phone # Fax #    Rosanna Lenz [phone] [address] [facility] [address]        Equal Access to Services     ARMIDA FINNEY : Hadii aad ku hadasho Soomaali, waaxda luqadaha, qaybta kaalmada adeadeleyada, rajwinder harirs . So [facility] [phone].    ATENCIÓN: Si habla español, tiene a wolff disposición servicios graturszulaos de asistencia lingüística. [facility] [phone].    We comply with applicable federal civil rights laws and [place] laws. We do not discriminate on the basis of race, color, national origin, age, disability, sex, sexual orientation, or gender identity.            Thank you!     Thank you for choosing [facility] [place]  for your care. Our goal is always to provide you with excellent care. Hearing back from our patients is one way we can continue to improve our services. Please take a few minutes to complete the written survey that you may receive in the mail after your visit with us. Thank you!             Your Updated Medication List - Protect others  around you: Learn how to safely use, store and throw away your medicines at www.disposemymeds.org.          This list is accurate as of 8/21/18  1:52 PM.  Always use your most recent med list.                   Brand Name Dispense Instructions for use Diagnosis    albuterol 108 (90 Base) MCG/ACT inhaler    PROAIR HFA/PROVENTIL HFA/VENTOLIN HFA    1 Inhaler    Inhale 2 puffs into the lungs every 4 hours as needed for shortness of breath / dyspnea    Mild persistent asthma without complication       budesonide-formoterol 80-4.5 MCG/ACT Inhaler    SYMBICORT    3 Inhaler    Inhale 2 puffs into the lungs 2 times daily    Mild persistent asthma without complication       cetirizine 10 MG tablet    zyrTEC     Take 1 tablet by mouth daily as needed for allergies.        Fish Oil 500 MG Caps      Take  by mouth. 1 daily        ipratropium - albuterol 0.5 mg/2.5 mg/3 mL 0.5-2.5 (3) MG/3ML neb solution    DUONEB    30 vial    Take 1 vial (3 mLs) by nebulization every 6 hours as needed for shortness of breath / dyspnea or wheezing    Mild intermittent asthma with exacerbation       MULTI-VITAMIN PO      DAILY        NASACORT ALLERGY 24HR NA           tolterodine 4 MG 24 hr capsule    DETROL LA    90 capsule    Take 1 capsule (4 mg) by mouth daily    Overactive bladder       triamcinolone 0.1 % cream    KENALOG    30 g    Apply to affected area sparingly twice daily as needed    Dermatitis

## 2018-08-21 NOTE — NURSING NOTE
"Wendy Francis is a 63 year old female who presents for:  Chief Complaint   Patient presents with     RECHECK     follow up CT 8/15/18.        Vitals:    Vitals:    08/21/18 1041   BP: 138/82   BP Location: Right arm   Patient Position: Chair   Cuff Size: Adult Regular   Pulse: 83   SpO2: 94%   Weight: 136 lb (61.7 kg)       BMI:  Estimated body mass index is 21.62 kg/(m^2) as calculated from the following:    Height as of 8/6/18: 5' 6.5\" (1.689 m).    Weight as of this encounter: 136 lb (61.7 kg).    Pain Score:  Data Unavailable        Cinthya Bryant MA      "

## 2018-09-04 ENCOUNTER — TELEPHONE (OUTPATIENT)
Dept: FAMILY MEDICINE | Facility: CLINIC | Age: 63
End: 2018-09-04

## 2018-09-04 ENCOUNTER — RADIANT APPOINTMENT (OUTPATIENT)
Dept: ULTRASOUND IMAGING | Facility: CLINIC | Age: 63
End: 2018-09-04
Attending: FAMILY MEDICINE
Payer: COMMERCIAL

## 2018-09-04 DIAGNOSIS — N83.8 OVARIAN MASS, LEFT: ICD-10-CM

## 2018-09-04 PROCEDURE — 76856 US EXAM PELVIC COMPLETE: CPT

## 2018-09-04 PROCEDURE — 76830 TRANSVAGINAL US NON-OB: CPT

## 2018-09-04 NOTE — TELEPHONE ENCOUNTER
Message left for patient to call the RN hotline # at [phone]    Rosanna Lenz MD  P Fz Rn Triage Pool                   Please call patient:   You do have a left ovarian cyst which should be evaluated further. Please consult with a gynecologic oncologist to determine the best way to do this. I have referred you to: [facility]: Gynecologic Oncology at [facility] [phone]   [email or web address]     Rosanna Lenz MD

## 2018-09-04 NOTE — TELEPHONE ENCOUNTER
Patient notified of providers message as written.  Per patient she received the message via BiOMt.  Patient verbalized understanding, no further questions or concerns.    Mae Bañuelos RN

## 2018-09-04 NOTE — PROGRESS NOTES
Please call patient:  You do have a left ovarian cyst which should be evaluated further. Please consult with a gynecologic oncologist to determine the best way to do this. I have referred you to: [facility]: Gynecologic Oncology at [facility] [phone]   [email or web address]    Rosanna Lenz MD

## 2018-09-04 NOTE — TELEPHONE ENCOUNTER
Date of appointment: 18 @ 1540   Diagnosis/reason for appointment: Left Ovarian Mass, Hx of Cervical Cancer  Referring provider/facility: Dr. Rosanna Lenz - [facility]  Who called: Patient    Recent Studies - ALL in Epic  Imagin/15/18 - CT Abd/Pelvis, 18 - US Pelvic Complete  Labs: 18 - HPV High Risk

## 2018-09-05 ASSESSMENT — ENCOUNTER SYMPTOMS
HEARTBURN: 0
ABDOMINAL PAIN: 0
BLOOD IN STOOL: 0
JAUNDICE: 0
RECTAL PAIN: 0
BLOATING: 0
BOWEL INCONTINENCE: 1
NAUSEA: 1
CONSTIPATION: 0
VOMITING: 1
DIARRHEA: 0

## 2018-09-06 ENCOUNTER — PRE VISIT (OUTPATIENT)
Dept: ONCOLOGY | Facility: CLINIC | Age: 63
End: 2018-09-06

## 2018-09-06 ENCOUNTER — ONCOLOGY VISIT (OUTPATIENT)
Dept: ONCOLOGY | Facility: CLINIC | Age: 63
End: 2018-09-06
Attending: OBSTETRICS & GYNECOLOGY
Payer: COMMERCIAL

## 2018-09-06 VITALS
RESPIRATION RATE: 18 BRPM | DIASTOLIC BLOOD PRESSURE: 81 MMHG | WEIGHT: 138.23 LBS | HEART RATE: 74 BPM | BODY MASS INDEX: 21.98 KG/M2 | TEMPERATURE: 97.8 F | OXYGEN SATURATION: 96 % | SYSTOLIC BLOOD PRESSURE: 147 MMHG

## 2018-09-06 DIAGNOSIS — N83.8 OVARIAN MASS, LEFT: ICD-10-CM

## 2018-09-06 DIAGNOSIS — Z85.41 HISTORY OF CERVICAL CANCER: Primary | ICD-10-CM

## 2018-09-06 LAB — CANCER AG125 SERPL-ACNC: 16 U/ML (ref 0–30)

## 2018-09-06 PROCEDURE — G0463 HOSPITAL OUTPT CLINIC VISIT: HCPCS | Mod: ZF

## 2018-09-06 PROCEDURE — 99205 OFFICE O/P NEW HI 60 MIN: CPT | Mod: ZP | Performed by: OBSTETRICS & GYNECOLOGY

## 2018-09-06 PROCEDURE — 86304 IMMUNOASSAY TUMOR CA 125: CPT | Performed by: OBSTETRICS & GYNECOLOGY

## 2018-09-06 ASSESSMENT — PAIN SCALES - GENERAL: PAINLEVEL: NO PAIN (0)

## 2018-09-06 NOTE — NURSING NOTE
"Oncology Rooming Note    September 6, 2018 9:10 AM   Wendy Francis is a 63 year old female who presents for:    Chief Complaint   Patient presents with     Oncology Clinic Visit     New for Ovarian Mass      Initial Vitals: /81  Pulse 74  Temp 97.8  F (36.6  C) (Oral)  Resp 18  Wt 62.7 kg (138 lb 3.7 oz)  SpO2 96%  BMI 21.98 kg/m2 Estimated body mass index is 21.98 kg/(m^2) as calculated from the following:    Height as of 8/6/18: 1.689 m (5' 6.5\").    Weight as of this encounter: 62.7 kg (138 lb 3.7 oz). Body surface area is 1.72 meters squared.  No Pain (0) Comment: Data Unavailable   No LMP recorded. Patient is postmenopausal.  Allergies reviewed: Yes  Medications reviewed: Yes    Medications: Medication refills not needed today.  Pharmacy name entered into EPIC:    [employer] - A MAIL ORDER [facility] - [facility], [place] - [address] [place] [address] PHARM[address] [facility] - [place], [place] - [address] [place] [address] AVE     Clinical concerns: Unknowns  Fabiola  was notified.    10  minutes for nursing intake (face to face time)     Whitney Adams MA              "

## 2018-09-06 NOTE — MR AVS SNAPSHOT
After Visit Summary   9/6/2018    Wendy Francis    MRN: [id number]           Patient Information     Date Of Birth          1955        Visit Information        Provider Department      9/6/2018 9:00 AM Genesis Hicks MD [facility]        Today's Diagnoses     History of cervical cancer    -  1    Ovarian mass, left          Care Instructions    Obtain  today  Schedule Pelvic US in 6 weeks with follow up appointment with Dr. Hicks          Follow-ups after your visit        Your next 10 appointments already scheduled     Sep 28, 2018 10:50 AM CDT   Return Visit with Wally Gu MD   [facility] ([facility])    [address]  [facility] [id number] [phone]              Future tests that were ordered for you today     Open Future Orders        Priority Expected Expires Ordered    US pelvis complete Routine 10/31/2018 9/6/2019 9/6/2018            Who to contact     If you have questions or need follow up information about today's clinic visit or your schedule please contact [facility] directly at [phone].  Normal or non-critical lab and imaging results will be communicated to you by Omisehart, letter or phone within 4 business days after the clinic has received the results. If you do not hear from us within 7 days, please contact the clinic through Omisehart or phone. If you have a critical or abnormal lab result, we will notify you by phone as soon as possible.  Submit refill requests through Zyante or call your pharmacy and they will forward the refill request to us. Please allow 3 business days for your refill to be completed.          Additional Information About Your Visit        MyChart Information     Zyante gives you secure access to your electronic health record. If you see a primary care provider, you can also send messages to your care  team and make appointments. If you have questions, please call your primary care clinic.  If you do not have a primary care provider, please call [phone] and they will assist you.        Care EveryWhere ID     This is your Care EveryWhere ID. This could be used by other organizations to access your [facility] medical records  [id number]        Your Vitals Were     Pulse Temperature Respirations Pulse Oximetry BMI (Body Mass Index)       74 97.8  F (36.6  C) (Oral) 18 96% 21.98 kg/m2        Blood Pressure from Last 3 Encounters:   09/06/18 147/81   08/21/18 138/82   08/15/18 131/86    Weight from Last 3 Encounters:   09/06/18 62.7 kg (138 lb 3.7 oz)   08/21/18 61.7 kg (136 lb)   08/15/18 62 kg (136 lb 9.6 oz)              We Performed the Following             Primary Care Provider Office Phone # Fax #    Rosanna Lenz [phone]       [address] [facility] [address]        Equal Access to Services     [facility]: Hadii aad ku hadasho Soomaali, waaxda luqadaha, qaybta kaalmada adetien, rajwinder harris . So [facility] [phone].    ATENCIÓN: Si habla español, tiene a wolff disposición servicios gratuitos de asistencia lingüística. [facility] [phone].    We comply with applicable federal civil rights laws and [place] laws. We do not discriminate on the basis of race, color, national origin, age, disability, sex, sexual orientation, or gender identity.            Thank you!     Thank you for choosing [facility]  for your care. Our goal is always to provide you with excellent care. Hearing back from our patients is one way we can continue to improve our services. Please take a few minutes to complete the written survey that you may receive in the mail after your visit with us. Thank you!             Your Updated Medication List - Protect others around you: Learn how to safely use, store and throw away your medicines at  www.disposemymeds.org.          This list is accurate as of 9/6/18 10:05 AM.  Always use your most recent med list.                   Brand Name Dispense Instructions for use Diagnosis    albuterol 108 (90 Base) MCG/ACT inhaler    PROAIR HFA/PROVENTIL HFA/VENTOLIN HFA    1 Inhaler    Inhale 2 puffs into the lungs every 4 hours as needed for shortness of breath / dyspnea    Mild persistent asthma without complication       budesonide-formoterol 80-4.5 MCG/ACT Inhaler    SYMBICORT    3 Inhaler    Inhale 2 puffs into the lungs 2 times daily    Mild persistent asthma without complication       cetirizine 10 MG tablet    zyrTEC     Take 1 tablet by mouth daily as needed for allergies.        Fish Oil 500 MG Caps      Take  by mouth. 1 daily        ipratropium - albuterol 0.5 mg/2.5 mg/3 mL 0.5-2.5 (3) MG/3ML neb solution    DUONEB    30 vial    Take 1 vial (3 mLs) by nebulization every 6 hours as needed for shortness of breath / dyspnea or wheezing    Mild intermittent asthma with exacerbation       MULTI-VITAMIN PO      DAILY        NASACORT ALLERGY 24HR NA           tolterodine 4 MG 24 hr capsule    DETROL LA    90 capsule    Take 1 capsule (4 mg) by mouth daily    Overactive bladder       triamcinolone 0.1 % cream    KENALOG    30 g    Apply to affected area sparingly twice daily as needed    Dermatitis       UNABLE TO FIND      MEDICATION NAME: Compounded Drug, Budesoonide  ( sinuses, Nasal polops )

## 2018-09-06 NOTE — PROGRESS NOTES
GYNECOLOGIC  ONCOLOGY CONSULT    Referring provider:    Rosanna Lenz MD  [address] [facility]  [place] [address]   RE: Wendy Francis  : 1955  AR: 2018    CC: Left ovarian cyst    HPI: Ms Wendy Francsi is a 63 year old year old female who presents for consultation regarding left ovarian cyst.  Her past gynecologic history is notable for cervical cancer diagnosed in  for which she underwent chemoradiation therapy and has been without recurrence since her initial diagnosis.  She began to experience some lower extremity edema although does not clearly stated as lymphedema and was utilizing compression stockings most recently she noted that there was increasing swelling in the right lower extremity.  As per the course of evaluation lower externally ultrasound was performed which ruled out deep venous thrombosis and pelvic CT noted a 6.3 cm complex left ovarian cyst.  CT scan was performed on August 15 follow-up pelvic ultrasound performed on  noted.  Uterus at 4.3 cm with endometrial thickness of 0.2 cm with a left complex ovarian cyst with thin septation measuring 4 x 3.7 x 4.3 cm with no associated ascites.     Wendy reports that overall she has been feeling well with the exception of that lower extremity edema she is active as noted no loss of appetite no weight loss.  Several months ago she had experienced loose stool over 3 months underwent evaluation she states she was unable to undergo colonoscopy but a barium enema was performed that was negative.      OBGYN history and Health Maintenance:  G 0  Last Pap Smear:  negative HPV megative  Last Mammogram:2016, negataive  Last Colonoscopy: *Never, Barium Enema negative     Review of Systems:  Answers for HPI/ROS submitted by the patient on 2018   General Symptoms: No  Skin Symptoms: No  HENT Symptoms: No  EYE SYMPTOMS: No  HEART SYMPTOMS: No  LUNG SYMPTOMS: No  INTESTINAL SYMPTOMS: Yes  URINARY SYMPTOMS:  No  GYNECOLOGIC SYMPTOMS: No  BREAST SYMPTOMS: No  SKELETAL SYMPTOMS: No  BLOOD SYMPTOMS: No  NERVOUS SYSTEM SYMPTOMS: No  MENTAL HEALTH SYMPTOMS: No  Heart burn or indigestion: No  Nausea: Yes  Vomiting: Yes  Abdominal pain: No  Bloating: No  Constipation: No  Diarrhea: No  Blood in stool: No  Black stools: No  Rectal or Anal pain: No  Fecal incontinence: Yes  Yellowing of skin or eyes: No  Vomit with blood: No  Change in stools: No        Past Medical History:   Diagnosis Date     Allergic rhinitis     molds, dust     Atrophic vaginitis      Cervical cancer (H) 4/10/2000    chemo and radiation, IB-II squamous cell, positive nodes     Diverticulosis of colon 6/2004     Hypertension goal BP (blood pressure) < 140/90      Liver mass      Mild persistent asthma      Mild recurrent major depression (H)      Nasal polyps      Ovarian mass, left      Overactive bladder      Sensorineural hearing losses      Tinnitus      Varicose vein of leg     No surgery       Past Surgical History:   Procedure Laterality Date     COLONOSCOPY WITH CO2 INSUFFLATION N/A 4/12/2017    Procedure: COLONOSCOPY WITH CO2 INSUFFLATION;  Surgeon: Anatoly Do MD;  Location: [facility] OR     COLPOSCOPY,LOOP ELECTRD CERVIX EXCIS  2000    Radiation & chemotherapy for cervical cancer. Diagnosed in 2000     HC CORRECT BUNION,SIMPLE  over 10 years ago    ESTEPHANIE     SINUS SURGERY      x 2, Nasal polyps removed          Current Outpatient Prescriptions   Medication Sig Dispense Refill     albuterol (PROAIR HFA/PROVENTIL HFA/VENTOLIN HFA) 108 (90 BASE) MCG/ACT Inhaler Inhale 2 puffs into the lungs every 4 hours as needed for shortness of breath / dyspnea 1 Inhaler 4     budesonide-formoterol (SYMBICORT) 80-4.5 MCG/ACT Inhaler Inhale 2 puffs into the lungs 2 times daily 3 Inhaler 3     cetirizine (ZYRTEC) 10 MG tablet Take 1 tablet by mouth daily as needed for allergies.       ipratropium - albuterol 0.5 mg/2.5 mg/3 mL (DUONEB) 0.5-2.5 (3) MG/3ML neb  solution Take 1 vial (3 mLs) by nebulization every 6 hours as needed for shortness of breath / dyspnea or wheezing 30 vial 0     MULTI-VITAMIN OR DAILY       Omega-3 Fatty Acids (FISH OIL) 500 MG CAPS Take  by mouth. 1 daily         tolterodine (DETROL LA) 4 MG 24 hr capsule Take 1 capsule (4 mg) by mouth daily 90 capsule 3     triamcinolone (KENALOG) 0.1 % cream Apply to affected area sparingly twice daily as needed 30 g 1     Triamcinolone Acetonide (NASACORT ALLERGY 24HR NA)        UNABLE TO FIND MEDICATION NAME: Compounded Drug, Budesoonide  ( sinuses, Nasal polops )           Allergies   Allergen Reactions     Citalopram      Thinking changes     Flonase [Fluticasone Propionate]      Sulfa Drugs Rash     Sulfasalazine Rash       Social History:  Social History   Substance Use Topics     Smoking status: Never Smoker     Smokeless tobacco: Never Used     Alcohol use 1.0 oz/week      Comment: 1-2 a month       Family History:   The patient's family history is notable for cardiovascular disease.  Family History   Problem Relation Age of Onset     Cancer Maternal Grandmother      pelvic     Ovarian Cancer Maternal Grandmother      Neurologic Disorder Sister      brain aneurysm     Hypertension Father      Asthma Father      Pacemaker Father      Diabetes Maternal Grandfather      Allergies Brother      Cerebrovascular Disease Sister      C.A.D. No family hx of          Physical Exam:     /81  Pulse 74  Temp 97.8  F (36.6  C) (Oral)  Resp 18  Wt 62.7 kg (138 lb 3.7 oz)  SpO2 96%  BMI 21.98 kg/m2  Body mass index is 21.98 kg/(m^2).    General: Alert and oriented, no acute distress  Psych: Mood stable  Cardiovascular: RRR, no murmors  Pulmonary: Lungs clear . Normal respiratory effort  GI: No distention. No masses. No hernia.   Lymph: No enlarge lymph nodes in neck or groin  : Normal external genitalia.  Narrow vagina with radiation changes small obliterated cervix consistent with radiation effects on  bimanual exam no distinct pelvic mass noted .  Lower extremity with stockings in place right greater than left         Assessment:    Wendy Francis is a 63 year old woman with a new diagnosis of left ovarian cyst complex secondary to a thin septation measuring at 4.3 cm by pelvic ultrasound past medical history significant for pelvic radiation as course of treatment for cervical cancer.  Greater than 10 years ago.    At this visit I discussed with Wendy that the overall appearance of the cyst is benign in nature in addition it has decreased in size in a follow-up from the CT to the pelvic ultrasound and she is overall feeling well so we would prefer to pursue a conservative approach to the management.  I am recommending that she obtain a  and if this value is under 20 we would follow up with another repeat ultrasound in 6 weeks.  Alternatively if she begins to experience some symptoms of weight loss changes in appetite or pelvic pain or alternatively the  is elevated we would proceed with surgery soon.    Patient expressed understanding of these options and all of her questions were addressed at this visit.    Wendy is likely experiencing lower extremity edema that is attributed to her prior pelvic radiation therapy as a recent ultrasound ruled out a DVT at the follow-up visit we will addressed management in conjunction with the lymphedema clinic to control her symptoms.    Obtain  today and follow-up in 6 weeks.        A total of 60 minutes was spent with the patient, > 50% minutes of which were spent in counseling the patient and/or treatment planning.        Genesis Hicks M.D., MPH,  F.A.C.ONITHYA.    Department of Obstetrics, Gynecology and Women's Health  Division of Gynecologic Oncology  [phone]

## 2018-09-28 ENCOUNTER — OFFICE VISIT (OUTPATIENT)
Dept: OTHER | Facility: CLINIC | Age: 63
End: 2018-09-28
Attending: INTERNAL MEDICINE
Payer: COMMERCIAL

## 2018-09-28 VITALS
SYSTOLIC BLOOD PRESSURE: 138 MMHG | BODY MASS INDEX: 22.39 KG/M2 | WEIGHT: 140.8 LBS | OXYGEN SATURATION: 93 % | DIASTOLIC BLOOD PRESSURE: 88 MMHG | HEART RATE: 91 BPM

## 2018-09-28 DIAGNOSIS — I89.0 ACQUIRED LYMPHEDEMA: Primary | ICD-10-CM

## 2018-09-28 PROCEDURE — G0463 HOSPITAL OUTPT CLINIC VISIT: HCPCS

## 2018-09-28 PROCEDURE — 99214 OFFICE O/P EST MOD 30 MIN: CPT | Mod: ZP | Performed by: INTERNAL MEDICINE

## 2018-09-28 NOTE — MR AVS SNAPSHOT
After Visit Summary   9/28/2018    Wendy Francis    MRN: [id number]           Patient Information     Date Of Birth          1955        Visit Information        Provider Department      9/28/2018 10:50 AM Walyl Gu MD [facility] [place] [place]      Today's Diagnoses     Acquired lymphedema    -  1       Follow-ups after your visit        Follow-up notes from your care team     Return in about 6 months (around 3/28/2019).      Your next 10 appointments already scheduled     Oct 12, 2018  7:30 AM CDT   (Arrive by 7:15 AM)   US PELVIS COMPLETE W/O TRANSVAGINAL with FKUS1   [facility]    [address] [place] [address] [id number] [phone]           How do I prepare for my exam? (Food and drink instructions) Adults: Drink four 8-ounce glasses of fluid an hour before your exam. If you need to empty your bladder before your exam, try to release only a little urine. Then, drink another glass of fluid.  Children: * Children who are potty trained up to 6 years old should drink at least 2 cups (16 oz) of water/non-carbonated beverage 30 minutes prior to the exam. * Children who are 6-10 years should drink at least 3 cups (24 oz) of water/non-carbonated beverage 45 minutes prior to the exam. * Children who are 10 years or older should drink at least 4 cups (32 oz) of water/non-carbonated beverage 45 minutes prior to the exam.  If your child is very uncomfortable or has an urgent need to pee, please notify a technologist; they will try to find out how much longer the wait may be and provide instructions to help relieve the pressure.  What should I wear: Wear comfortable clothes.  How long does the exam take: Most ultrasounds take 30 to 60 minutes.  What should I bring: Bring a list of your medicines, including vitamins, minerals and over-the-counter drugs. It is safest to leave personal  items at home.  Do I need a :  No  is needed.  What do I need to tell my doctor: Tell your doctor about any allergies you may have.  What should I do after the exam: No restrictions, You may resume normal activities.  What is this test: An ultrasound uses sound waves to make pictures of the body. Sound waves do not cause pain. The only discomfort may be the pressure of the wand against your skin or full bladder.  Who should I call with questions: If you have any questions, please call the Imaging Department where you will have your exam. Directions, parking instructions, and other information is available on our website, [email or web address].            Oct 18, 2018  5:40 PM CDT   (Arrive by 5:25 PM)   Return Visit with Genesis Hicks MD   [facility] ([facility])    [address] [place] [address]  [address] [place] [id number] [phone]              Who to contact     If you have questions or need follow up information about today's clinic visit or your schedule please contact [facility] directly at [phone].  Normal or non-critical lab and imaging results will be communicated to you by MyChart, letter or phone within 4 business days after the clinic has received the results. If you do not hear from us within 7 days, please contact the clinic through Lifesumhart or phone. If you have a critical or abnormal lab result, we will notify you by phone as soon as possible.  Submit refill requests through Codility or call your pharmacy and they will forward the refill request to us. Please allow 3 business days for your refill to be completed.          Additional Information About Your Visit        Lifesumhart Information     Codility gives you secure access to your electronic health record. If you see a primary care provider, you can also send messages to your care team and make appointments. If you have questions, please call your primary care  clinic.  If you do not have a primary care provider, please call [phone] and they will assist you.        Care EveryWhere ID     This is your Care EveryWhere ID. This could be used by other organizations to access your [place] medical records  [id number]        Your Vitals Were     Pulse Pulse Oximetry Breastfeeding? BMI (Body Mass Index)          91 93% No 22.39 kg/m2         Blood Pressure from Last 3 Encounters:   09/28/18 138/88   09/06/18 147/81   08/21/18 138/82    Weight from Last 3 Encounters:   09/28/18 140 lb 12.8 oz (63.9 kg)   09/06/18 138 lb 3.7 oz (62.7 kg)   08/21/18 136 lb (61.7 kg)              Today, you had the following     No orders found for display       Primary Care Provider Office Phone # Fax #    Rosanna Lenz [phone]       [address] [place] [address]  [facility] [address]        Equal Access to Services     RICHARD FINNEY : Hadii aad ku hadasho Soomaali, waaxda luqadaha, qaybta kaalmada adeegyada, waxay idiin hayaan adeeg kharamaria teresa la'jose migueln . So [facility] [phone].    ATENCIÓN: Si habla español, tiene a wolff disposición servicios gratuitos de asistencia lingüística. Llame al [phone].    We comply with applicable federal civil rights laws and [place] laws. We do not discriminate on the basis of race, color, national origin, age, disability, sex, sexual orientation, or gender identity.            Thank you!     Thank you for choosing [facility] [place]  for your care. Our goal is always to provide you with excellent care. Hearing back from our patients is one way we can continue to improve our services. Please take a few minutes to complete the written survey that you may receive in the mail after your visit with us. Thank you!             Your Updated Medication List - Protect others around you: Learn how to safely use, store and throw away your medicines at www.disposemymeds.org.          This list is accurate as of 9/28/18 11:22 AM.  Always use your  most recent med list.                   Brand Name Dispense Instructions for use Diagnosis    albuterol 108 (90 Base) MCG/ACT inhaler    PROAIR HFA/PROVENTIL HFA/VENTOLIN HFA    1 Inhaler    Inhale 2 puffs into the lungs every 4 hours as needed for shortness of breath / dyspnea    Mild persistent asthma without complication       budesonide-formoterol 80-4.5 MCG/ACT Inhaler    SYMBICORT    3 Inhaler    Inhale 2 puffs into the lungs 2 times daily    Mild persistent asthma without complication       cetirizine 10 MG tablet    zyrTEC     Take 1 tablet by mouth daily as needed for allergies.        Fish Oil 500 MG Caps      Take  by mouth. 1 daily        ipratropium - albuterol 0.5 mg/2.5 mg/3 mL 0.5-2.5 (3) MG/3ML neb solution    DUONEB    30 vial    Take 1 vial (3 mLs) by nebulization every 6 hours as needed for shortness of breath / dyspnea or wheezing    Mild intermittent asthma with exacerbation       MULTI-VITAMIN PO      DAILY        NASACORT ALLERGY 24HR NA           tolterodine 4 MG 24 hr capsule    DETROL LA    90 capsule    Take 1 capsule (4 mg) by mouth daily    Overactive bladder       triamcinolone 0.1 % cream    KENALOG    30 g    Apply to affected area sparingly twice daily as needed    Dermatitis       UNABLE TO FIND      MEDICATION NAME: Compounded Drug, Budesoonide  ( sinuses, Nasal polops )

## 2018-09-28 NOTE — NURSING NOTE
"Wendy Francis is a 63 year old female who presents for:  Chief Complaint   Patient presents with     RECHECK     Follow up compression stockings        Vitals:    Vitals:    09/28/18 1042   BP: 138/88   BP Location: Left arm   Patient Position: Sitting   Cuff Size: Adult Regular   Pulse: 91   SpO2: 93%   Weight: 140 lb 12.8 oz (63.9 kg)       BMI:  Estimated body mass index is 22.39 kg/(m^2) as calculated from the following:    Height as of 8/6/18: 5' 6.5\" (1.689 m).    Weight as of this encounter: 140 lb 12.8 oz (63.9 kg).    Pain Score:  Data Unavailable        Anh Isidro      "

## 2018-09-28 NOTE — PROGRESS NOTES
Vascular Medicine Progress Note     Wendy Francis is a 63 year old female who who is here today for follow-up on right lower extremity edema    Interval History   Patient patient is is here today for follow-up on right lower extremity edema secondary to lymphedema resulting from lymph node resection and radiation status post cancer cervix  Patient is wearing compression stockings edema is much better  CT scan of the abdomen pelvis follow-up showed an ovarian cyst and it it is followed by OB/GYN service    Physical Exam       BP: 138/88 Pulse: 91     SpO2: 93 %      Vitals:    09/28/18 1042   Weight: 140 lb 12.8 oz (63.9 kg)     Vital Signs with Ranges  Pulse:  [91] 91  BP: (138)/(88) 138/88  SpO2:  [93 %] 93 %  [unfilled]    Constitutional: awake, alert, cooperative, no apparent distress, and appears stated age  Eyes: Lids and lashes normal, pupils equal, round and reactive to light, extra ocular muscles intact, sclera clear, conjunctiva normal  ENT: normocepalic, without obvious abnormality, oropharynx pink and moist  Hematologic / Lymphatic: no lymphadenopathy  Respiratory: No increased work of breathing, good air exchange, clear to auscultation bilaterally, no crackles or wheezing  Cardiovascular: regular rate and rhythm, normal S1 and S2 and no murmur noted  GI: Normal bowel sounds, soft, non-distended, non-tender  Skin: no redness, warmth, or swelling, no rashes and no lesions  Musculoskeletal: There is no redness, warmth, or swelling of the joints.  Full range of motion noted.  Motor strength is 5 out of 5 all extremities bilaterally.  Tone is normal.  Neurologic: Awake, alert, oriented to name, place and time.  Cranial nerves II-XII are grossly intact.  Motor is 5 out of 5 bilaterally.    Neuropsychiatric:  Normal affect, memory, insight.  Pulses: Same as previous exam, bilateral lower extremity edema more pronounced on the right lower extremity its +1  . No carotid bruits appreciated.      Medications         Data   No results found for this or any previous visit (from the past 24 hour(s)).    Assessment & Plan   No diagnosis found.      Summary: Continue wearing compression stockings, thigh-high 20-30 mmHg  Follow-up in 6 months    Wally Gu MD

## 2018-10-01 ENCOUNTER — MYC MEDICAL ADVICE (OUTPATIENT)
Dept: FAMILY MEDICINE | Facility: CLINIC | Age: 63
End: 2018-10-01

## 2018-10-04 ASSESSMENT — ENCOUNTER SYMPTOMS
HEARTBURN: 1
VOMITING: 1
JAUNDICE: 0
RECTAL PAIN: 0
BLOATING: 0
CONSTIPATION: 1
NAUSEA: 1
BLOOD IN STOOL: 0
DIARRHEA: 1
BOWEL INCONTINENCE: 1
ABDOMINAL PAIN: 0

## 2018-10-12 ENCOUNTER — RADIANT APPOINTMENT (OUTPATIENT)
Dept: ULTRASOUND IMAGING | Facility: CLINIC | Age: 63
End: 2018-10-12
Attending: OBSTETRICS & GYNECOLOGY
Payer: COMMERCIAL

## 2018-10-12 DIAGNOSIS — N83.8 OVARIAN MASS, LEFT: ICD-10-CM

## 2018-10-12 PROCEDURE — 76856 US EXAM PELVIC COMPLETE: CPT

## 2018-10-12 PROCEDURE — 76830 TRANSVAGINAL US NON-OB: CPT

## 2018-10-18 ENCOUNTER — ONCOLOGY VISIT (OUTPATIENT)
Dept: ONCOLOGY | Facility: CLINIC | Age: 63
End: 2018-10-18
Attending: OBSTETRICS & GYNECOLOGY
Payer: COMMERCIAL

## 2018-10-18 VITALS
WEIGHT: 143.3 LBS | HEART RATE: 83 BPM | SYSTOLIC BLOOD PRESSURE: 159 MMHG | OXYGEN SATURATION: 96 % | BODY MASS INDEX: 22.78 KG/M2 | TEMPERATURE: 98.1 F | DIASTOLIC BLOOD PRESSURE: 81 MMHG

## 2018-10-18 DIAGNOSIS — N83.202 CYST OF LEFT OVARY: Primary | ICD-10-CM

## 2018-10-18 PROCEDURE — G0463 HOSPITAL OUTPT CLINIC VISIT: HCPCS | Mod: ZF

## 2018-10-18 PROCEDURE — 99214 OFFICE O/P EST MOD 30 MIN: CPT | Mod: ZP | Performed by: OBSTETRICS & GYNECOLOGY

## 2018-10-18 RX ORDER — PHENAZOPYRIDINE HYDROCHLORIDE 200 MG/1
200 TABLET, FILM COATED ORAL ONCE
Status: CANCELLED | OUTPATIENT
Start: 2018-10-18 | End: 2018-10-18

## 2018-10-18 RX ORDER — HEPARIN SODIUM 5000 [USP'U]/.5ML
5000 INJECTION, SOLUTION INTRAVENOUS; SUBCUTANEOUS
Status: CANCELLED | OUTPATIENT
Start: 2018-10-18

## 2018-10-18 RX ORDER — CEFAZOLIN SODIUM 2 G/50ML
2 SOLUTION INTRAVENOUS
Status: CANCELLED | OUTPATIENT
Start: 2018-10-18

## 2018-10-18 RX ORDER — CEFAZOLIN SODIUM 1 G/50ML
1 INJECTION, SOLUTION INTRAVENOUS SEE ADMIN INSTRUCTIONS
Status: CANCELLED | OUTPATIENT
Start: 2018-10-18

## 2018-10-18 ASSESSMENT — PAIN SCALES - GENERAL: PAINLEVEL: NO PAIN (0)

## 2018-10-18 NOTE — MR AVS SNAPSHOT
After Visit Summary   10/18/2018    Wendy Francis    MRN: [id number]           Patient Information     Date Of Birth          1955        Visit Information        Provider Department      10/18/2018 5:40 PM Genesis Hicks MD [facility]        Today's Diagnoses     Cyst of left ovary    -  1       Follow-ups after your visit        Your next 10 appointments already scheduled     Dec 13, 2018  2:20 PM CST   (Arrive by 2:05 PM)   Post-Op with Genesis Hicks MD   [facility] ([facility])    [address] [place] [address]  [address] [place] [id number] [phone]              Who to contact     If you have questions or need follow up information about today's clinic visit or your schedule please contact [facility] directly at [phone].  Normal or non-critical lab and imaging results will be communicated to you by MyChart, letter or phone within 4 business days after the clinic has received the results. If you do not hear from us within 7 days, please contact the clinic through MyChart or phone. If you have a critical or abnormal lab result, we will notify you by phone as soon as possible.  Submit refill requests through [employer] or call your pharmacy and they will forward the refill request to us. Please allow 3 business days for your refill to be completed.          Additional Information About Your Visit        MyChart Information     [employer] gives you secure access to your electronic health record. If you see a primary care provider, you can also send messages to your care team and make appointments. If you have questions, please call your primary care clinic.  If you do not have a primary care provider, please call [phone] and they will assist you.        Care EveryWhere ID     This is your Care EveryWhere ID. This could be used by other organizations to access your [facility]  records  [id number]        Your Vitals Were     Pulse Temperature Pulse Oximetry BMI (Body Mass Index)          83 98.1  F (36.7  C) (Oral) 96% 22.78 kg/m2         Blood Pressure from Last 3 Encounters:   10/18/18 159/81   09/28/18 138/88   09/06/18 147/81    Weight from Last 3 Encounters:   10/18/18 65 kg (143 lb 4.8 oz)   09/28/18 63.9 kg (140 lb 12.8 oz)   09/06/18 62.7 kg (138 lb 3.7 oz)              We Performed the Following     Odalys-Operative Worksheet - Laparoscopic bilateral Salpingo-Oophorectomy, possible open/laparotomy, possible cancer staging        Primary Care Provider Office Phone # Fax #    Rosanna Lenz [phone] [address] [facility] [address]        Equal Access to Services     [facility]: Hadii breonna louise hadasho Sojane, waaxda luqadaha, qaybta kaalmada adeegyada, rajwinder harris . So [facility] [phone].    ATENCIÓN: Si habla español, tiene a wolff disposición servicios gratuitos de asistencia lingüística. Llame al [phone].    We comply with applicable federal civil rights laws and [place] laws. We do not discriminate on the basis of race, color, national origin, age, disability, sex, sexual orientation, or gender identity.            Thank you!     Thank you for choosing [facility]  for your care. Our goal is always to provide you with excellent care. Hearing back from our patients is one way we can continue to improve our services. Please take a few minutes to complete the written survey that you may receive in the mail after your visit with us. Thank you!             Your Updated Medication List - Protect others around you: Learn how to safely use, store and throw away your medicines at www.disposemymeds.org.          This list is accurate as of 10/18/18 11:59 PM.  Always use your most recent med list.                   Brand Name Dispense Instructions for use Diagnosis    albuterol 108 (90 Base) MCG/ACT  inhaler    PROAIR HFA/PROVENTIL HFA/VENTOLIN HFA    1 Inhaler    Inhale 2 puffs into the lungs every 4 hours as needed for shortness of breath / dyspnea    Mild persistent asthma without complication       budesonide-formoterol 80-4.5 MCG/ACT Inhaler    SYMBICORT    3 Inhaler    Inhale 2 puffs into the lungs 2 times daily    Mild persistent asthma without complication       cetirizine 10 MG tablet    zyrTEC     Take 1 tablet by mouth daily as needed for allergies.        Fish Oil 500 MG Caps      Take  by mouth. 1 daily        ipratropium - albuterol 0.5 mg/2.5 mg/3 mL 0.5-2.5 (3) MG/3ML neb solution    DUONEB    30 vial    Take 1 vial (3 mLs) by nebulization every 6 hours as needed for shortness of breath / dyspnea or wheezing    Mild intermittent asthma with exacerbation       MULTI-VITAMIN PO      DAILY        NASACORT ALLERGY 24HR NA           tolterodine 4 MG 24 hr capsule    DETROL LA    90 capsule    Take 1 capsule (4 mg) by mouth daily    Overactive bladder       triamcinolone 0.1 % cream    KENALOG    30 g    Apply to affected area sparingly twice daily as needed    Dermatitis       UNABLE TO FIND      MEDICATION NAME: Compounded Drug, Budesoonide  ( sinuses, Nasal polops )

## 2018-10-18 NOTE — LETTER
10/18/2018       RE: Wendy Francis  [address]  [place] [address]     Dear Colleague,    Thank you for referring your patient, Wendy Francis, to the [facility]. Please see a copy of my visit note below.    GYNECOLOGIC  ONCOLOGY CONSULT    Referring provider:    Rosanna Lenz MD  [address] [facility] [address] [address]   RE: Wendy Francis  : 1955  AR: 2018      CC: Left ovarian cyst    HPI: Ms Wendy Francis is a 63 year old year old female who presents for follow up from ovarian cyst with a recent pelvic US.    Her past gynecologic history is notable for cervical cancer diagnosed in  for which she underwent chemoradiation therapy and has been without recurrence since her initial diagnosis. She underwent a laparotomy for lymph node dissection, which were positive.    She began to experience some lower extremity edema although does not clearly stated as lymphedema and was utilizing compression stockings most recently she noted that there was increasing swelling in the right lower extremity.  As per the course of evaluation lower externally ultrasound was performed which ruled out deep venous thrombosis and pelvic CT noted a 6.3 cm complex left ovarian cyst.  CT scan was performed on August 15,  follow-up pelvic ultrasound performed on  noted.  Uterus at 4.3 cm with endometrial thickness of 0.2 cm with a left complex ovarian cyst with thin septation measuring 4 x 3.7 x 4.3 cm with no associated ascites.       2018  16    10/12/2018 Pelvic US  FINDINGS: The uterus is anteflexed, measuring 4.0 x 1.7 x 3.7 cm in  long axis, short axis and transverse dimensions, respectively. No  myometrial masses. The endometrial stripe measures 0.2 cm in  dual-layer thickness. A trace amount of fluid is present in the  endometrial cavity, within normal physiologic limits. The right ovary  is not visualized. The left ovary measures 6.2 x 5.5 x  4.6 cm. A 5.3 x  5.1 x 4.0 cm anechoic cyst containing a 1.5 cm internal echogenicity  is present in the left ovary. No adnexal masses. No free fluid in the  pelvis.    Wendy reports that overall she has been feeling well with the exception of that lower extremity edema she is active as noted no loss of appetite no weight loss.  Several months ago she had experienced loose stool for 3 months underwent evaluation she states she was unable to undergo colonoscopy but a barium enema was performed that was negative.      OBGYN history and Health Maintenance:  G 0  Last Pap Smear: 2015 negative HPV megative  Last Mammogram:2016, negataive  Last Colonoscopy: Never, Barium Enema negative 2018    Review of Systems:  Answers for HPI/ROS submitted by the patient on 10/4/2018   General Symptoms: No  Skin Symptoms: No  HENT Symptoms: No  EYE SYMPTOMS: No  HEART SYMPTOMS: No  LUNG SYMPTOMS: No  INTESTINAL SYMPTOMS: Yes  URINARY SYMPTOMS: No  GYNECOLOGIC SYMPTOMS: No  BREAST SYMPTOMS: No  SKELETAL SYMPTOMS: No  BLOOD SYMPTOMS: No  NERVOUS SYSTEM SYMPTOMS: No  MENTAL HEALTH SYMPTOMS: No  Heart burn or indigestion: Yes  Nausea: Yes  Vomiting: Yes  Abdominal pain: No  Bloating: No  Constipation: Yes  Diarrhea: Yes  Blood in stool: No  Black stools: No  Rectal or Anal pain: No  Fecal incontinence: Yes  Yellowing of skin or eyes: No  Vomit with blood: No  Change in stools: No        Past Medical History:   Diagnosis Date     Allergic rhinitis     molds, dust     Atrophic vaginitis      Cervical cancer (H) 4/10/2000    chemo and radiation, IB-II squamous cell, positive nodes     Diverticulosis of colon 6/2004     Hypertension goal BP (blood pressure) < 140/90      Liver mass      Mild persistent asthma      Mild recurrent major depression (H)      Nasal polyps      Ovarian mass, left      Overactive bladder      Sensorineural hearing losses      Tinnitus      Varicose vein of leg     No surgery       Past Surgical History:   Procedure  Laterality Date     COLONOSCOPY WITH CO2 INSUFFLATION N/A 4/12/2017    Procedure: COLONOSCOPY WITH CO2 INSUFFLATION;  Surgeon: Anatoly Do MD;  Location: [facility] OR     COLPOSCOPY,LOOP ELECTRD CERVIX EXCIS  2000    Radiation & chemotherapy for cervical cancer. Diagnosed in 2000     HC CORRECT BUNION,SIMPLE  over 10 years ago    ESTEPHANIE     SINUS SURGERY      x 2, Nasal polyps removed          Current Outpatient Prescriptions   Medication Sig Dispense Refill     albuterol (PROAIR HFA/PROVENTIL HFA/VENTOLIN HFA) 108 (90 BASE) MCG/ACT Inhaler Inhale 2 puffs into the lungs every 4 hours as needed for shortness of breath / dyspnea 1 Inhaler 4     budesonide-formoterol (SYMBICORT) 80-4.5 MCG/ACT Inhaler Inhale 2 puffs into the lungs 2 times daily 3 Inhaler 3     cetirizine (ZYRTEC) 10 MG tablet Take 1 tablet by mouth daily as needed for allergies.       ipratropium - albuterol 0.5 mg/2.5 mg/3 mL (DUONEB) 0.5-2.5 (3) MG/3ML neb solution Take 1 vial (3 mLs) by nebulization every 6 hours as needed for shortness of breath / dyspnea or wheezing 30 vial 0     MULTI-VITAMIN OR DAILY       Omega-3 Fatty Acids (FISH OIL) 500 MG CAPS Take  by mouth. 1 daily         tolterodine (DETROL LA) 4 MG 24 hr capsule Take 1 capsule (4 mg) by mouth daily 90 capsule 3     triamcinolone (KENALOG) 0.1 % cream Apply to affected area sparingly twice daily as needed 30 g 1     Triamcinolone Acetonide (NASACORT ALLERGY 24HR NA)        UNABLE TO FIND MEDICATION NAME: Compounded Drug, Budesoonide  ( sinuses, Nasal polops )           Allergies   Allergen Reactions     Citalopram      Thinking changes     Flonase [Fluticasone Propionate]      Sulfa Drugs Rash     Sulfasalazine Rash       Social History:  Social History   Substance Use Topics     Smoking status: Never Smoker     Smokeless tobacco: Never Used     Alcohol use 1.0 oz/week      Comment: 1-2 a month       Family History:   The patient's family history is notable for cardiovascular  disease.  Family History   Problem Relation Age of Onset     Cancer Maternal Grandmother      pelvic     Ovarian Cancer Maternal Grandmother      Neurologic Disorder Sister      brain aneurysm     Hypertension Father      Asthma Father      Pacemaker Father      Diabetes Maternal Grandfather      Allergies Brother      Cerebrovascular Disease Sister      C.A.D. No family hx of          Physical Exam:     /81  Pulse 83  Temp 98.1  F (36.7  C) (Oral)  Wt 65 kg (143 lb 4.8 oz)  SpO2 96%  BMI 22.78 kg/m2  Body mass index is 22.78 kg/(m^2).    General: Alert and oriented, no acute distress          Assessment:    Wendy Francis is a 63 year old woman with a new diagnosis of left ovarian cyst complex secondary to a thin septation measuring at 6 x 5 cm by pelvic ultrasound past medical history significant for pelvic radiation as course of treatment for cervical cancer.  Greater than 10 years ago.    I reviewed the recent follow-up pelvic ultrasound which shows stable ovarian cyst and reviewed treatment options including surgical management versus conservative follow-up.     Wendy has some concern given her history of ovarian cancer in a maternal grandmother.  C1 25 was within normal limits.  Wendy would prefer to have both ovaries removed to exclude possible malignancy.    Risks benefits and alternatives to a laparoscopic assisted bilateral salpingo-oophorectomy was reviewed risks include possible.  Bowel injury possible need for additional surgery due to complications.  In addition in the setting of pelvic radiation expiratory laparotomy may be required to complete oophorectomy.  We also reviewed the need for possible cancer staging should there be a diagnosis of ovarian cancer.     All of the patient's questions were addressed to her satisfaction she will obtain a preoperative visit with her primary care clinic.  Patient scheduled for Nov 30 th based on her scheduling needs.      A total of 30 minutes was  spent with the patient, > 50% minutes of which were spent in counseling the patient and/or treatment planning.        Genesis Hicks M.D., MPH,  F.A.C.O.G.    Department of Obstetrics, Gynecology and Women's Health  Division of Gynecologic Oncology  [phone]

## 2018-10-18 NOTE — PROGRESS NOTES
GYNECOLOGIC  ONCOLOGY CONSULT    Referring provider:    Rosanna Lenz MD  [address] [facility]  [place] [address]   RE: Wendy Francis  : 1955  AR: 2018      CC: Left ovarian cyst    HPI: Ms Wendy Francis is a 63 year old year old female who presents for follow up from ovarian cyst with a recent pelvic US.    Her past gynecologic history is notable for cervical cancer diagnosed in  for which she underwent chemoradiation therapy and has been without recurrence since her initial diagnosis. She underwent a laparotomy for lymph node dissection, which were positive.    She began to experience some lower extremity edema although does not clearly stated as lymphedema and was utilizing compression stockings most recently she noted that there was increasing swelling in the right lower extremity.  As per the course of evaluation lower externally ultrasound was performed which ruled out deep venous thrombosis and pelvic CT noted a 6.3 cm complex left ovarian cyst.  CT scan was performed on August 15,  follow-up pelvic ultrasound performed on  noted.  Uterus at 4.3 cm with endometrial thickness of 0.2 cm with a left complex ovarian cyst with thin septation measuring 4 x 3.7 x 4.3 cm with no associated ascites.       2018  16    10/12/2018 Pelvic US  FINDINGS: The uterus is anteflexed, measuring 4.0 x 1.7 x 3.7 cm in  long axis, short axis and transverse dimensions, respectively. No  myometrial masses. The endometrial stripe measures 0.2 cm in  dual-layer thickness. A trace amount of fluid is present in the  endometrial cavity, within normal physiologic limits. The right ovary  is not visualized. The left ovary measures 6.2 x 5.5 x 4.6 cm. A 5.3 x  5.1 x 4.0 cm anechoic cyst containing a 1.5 cm internal echogenicity  is present in the left ovary. No adnexal masses. No free fluid in the  pelvis.    Wendy reports that overall she has been feeling well with the exception of  that lower extremity edema she is active as noted no loss of appetite no weight loss.  Several months ago she had experienced loose stool for 3 months underwent evaluation she states she was unable to undergo colonoscopy but a barium enema was performed that was negative.      OBGYN history and Health Maintenance:  G 0  Last Pap Smear: 2015 negative HPV megative  Last Mammogram:2016, negataive  Last Colonoscopy: Never, Barium Enema negative 2018    Review of Systems:  Answers for HPI/ROS submitted by the patient on 10/4/2018   General Symptoms: No  Skin Symptoms: No  HENT Symptoms: No  EYE SYMPTOMS: No  HEART SYMPTOMS: No  LUNG SYMPTOMS: No  INTESTINAL SYMPTOMS: Yes  URINARY SYMPTOMS: No  GYNECOLOGIC SYMPTOMS: No  BREAST SYMPTOMS: No  SKELETAL SYMPTOMS: No  BLOOD SYMPTOMS: No  NERVOUS SYSTEM SYMPTOMS: No  MENTAL HEALTH SYMPTOMS: No  Heart burn or indigestion: Yes  Nausea: Yes  Vomiting: Yes  Abdominal pain: No  Bloating: No  Constipation: Yes  Diarrhea: Yes  Blood in stool: No  Black stools: No  Rectal or Anal pain: No  Fecal incontinence: Yes  Yellowing of skin or eyes: No  Vomit with blood: No  Change in stools: No        Past Medical History:   Diagnosis Date     Allergic rhinitis     molds, dust     Atrophic vaginitis      Cervical cancer (H) 4/10/2000    chemo and radiation, IB-II squamous cell, positive nodes     Diverticulosis of colon 6/2004     Hypertension goal BP (blood pressure) < 140/90      Liver mass      Mild persistent asthma      Mild recurrent major depression (H)      Nasal polyps      Ovarian mass, left      Overactive bladder      Sensorineural hearing losses      Tinnitus      Varicose vein of leg     No surgery       Past Surgical History:   Procedure Laterality Date     COLONOSCOPY WITH CO2 INSUFFLATION N/A 4/12/2017    Procedure: COLONOSCOPY WITH CO2 INSUFFLATION;  Surgeon: Anatoly Do MD;  Location: [facility] OR     COLPOSCOPY,LOOP ELECTRD CERVIX EXCIS  2000    Radiation &  chemotherapy for cervical cancer. Diagnosed in 2000      CORRECT DANIELE,SIMPLE  over 10 years ago    ESTEPHANIE     SINUS SURGERY      x 2, Nasal polyps removed          Current Outpatient Prescriptions   Medication Sig Dispense Refill     albuterol (PROAIR HFA/PROVENTIL HFA/VENTOLIN HFA) 108 (90 BASE) MCG/ACT Inhaler Inhale 2 puffs into the lungs every 4 hours as needed for shortness of breath / dyspnea 1 Inhaler 4     budesonide-formoterol (SYMBICORT) 80-4.5 MCG/ACT Inhaler Inhale 2 puffs into the lungs 2 times daily 3 Inhaler 3     cetirizine (ZYRTEC) 10 MG tablet Take 1 tablet by mouth daily as needed for allergies.       ipratropium - albuterol 0.5 mg/2.5 mg/3 mL (DUONEB) 0.5-2.5 (3) MG/3ML neb solution Take 1 vial (3 mLs) by nebulization every 6 hours as needed for shortness of breath / dyspnea or wheezing 30 vial 0     MULTI-VITAMIN OR DAILY       Omega-3 Fatty Acids (FISH OIL) 500 MG CAPS Take  by mouth. 1 daily         tolterodine (DETROL LA) 4 MG 24 hr capsule Take 1 capsule (4 mg) by mouth daily 90 capsule 3     triamcinolone (KENALOG) 0.1 % cream Apply to affected area sparingly twice daily as needed 30 g 1     Triamcinolone Acetonide (NASACORT ALLERGY 24HR NA)        UNABLE TO FIND MEDICATION NAME: Compounded Drug, Budesoonide  ( sinuses, Nasal polops )           Allergies   Allergen Reactions     Citalopram      Thinking changes     Flonase [Fluticasone Propionate]      Sulfa Drugs Rash     Sulfasalazine Rash       Social History:  Social History   Substance Use Topics     Smoking status: Never Smoker     Smokeless tobacco: Never Used     Alcohol use 1.0 oz/week      Comment: 1-2 a month       Family History:   The patient's family history is notable for cardiovascular disease.  Family History   Problem Relation Age of Onset     Cancer Maternal Grandmother      pelvic     Ovarian Cancer Maternal Grandmother      Neurologic Disorder Sister      brain aneurysm     Hypertension Father      Asthma Father       Pacemaker Father      Diabetes Maternal Grandfather      Allergies Brother      Cerebrovascular Disease Sister      C.A.D. No family hx of          Physical Exam:     /81  Pulse 83  Temp 98.1  F (36.7  C) (Oral)  Wt 65 kg (143 lb 4.8 oz)  SpO2 96%  BMI 22.78 kg/m2  Body mass index is 22.78 kg/(m^2).    General: Alert and oriented, no acute distress          Assessment:    Wendy Francis is a 63 year old woman with a new diagnosis of left ovarian cyst complex secondary to a thin septation measuring at 6 x 5 cm by pelvic ultrasound past medical history significant for pelvic radiation as course of treatment for cervical cancer.  Greater than 10 years ago.    I reviewed the recent follow-up pelvic ultrasound which shows stable ovarian cyst and reviewed treatment options including surgical management versus conservative follow-up.     Wendy has some concern given her history of ovarian cancer in a maternal grandmother.  C1 25 was within normal limits.  Wendy would prefer to have both ovaries removed to exclude possible malignancy.    Risks benefits and alternatives to a laparoscopic assisted bilateral salpingo-oophorectomy was reviewed risks include possible.  Bowel injury possible need for additional surgery due to complications.  In addition in the setting of pelvic radiation expiratory laparotomy may be required to complete oophorectomy.  We also reviewed the need for possible cancer staging should there be a diagnosis of ovarian cancer.     All of the patient's questions were addressed to her satisfaction she will obtain a preoperative visit with her primary care clinic.  Patient scheduled for Nov 30 th based on her scheduling needs.      A total of 30 minutes was spent with the patient, > 50% minutes of which were spent in counseling the patient and/or treatment planning.        Genesis Hicks M.D., MPH,  F.A.C.O.G.    Department of Obstetrics, Gynecology and Women's  [facility] of Gynecologic Oncology  [phone]

## 2018-10-18 NOTE — NURSING NOTE
"Oncology Rooming Note    October 18, 2018 5:19 PM   Wendy Francis is a 63 year old female who presents for:    Chief Complaint   Patient presents with     Oncology Clinic Visit     Left Ovarian Mass- 6 week f.u     Initial Vitals: /81  Pulse 83  Temp 98.1  F (36.7  C) (Oral)  Wt 65 kg (143 lb 4.8 oz)  SpO2 96%  BMI 22.78 kg/m2 Estimated body mass index is 22.78 kg/(m^2) as calculated from the following:    Height as of 8/6/18: 1.689 m (5' 6.5\").    Weight as of this encounter: 65 kg (143 lb 4.8 oz). Body surface area is 1.75 meters squared.  No Pain (0) Comment: Data Unavailable   No LMP recorded. Patient is postmenopausal.  Allergies reviewed: Yes  Medications reviewed: Yes    Medications: Medication refills not needed today.  Pharmacy name entered into EPIC:    [facility] - [facility], [place] - [address] [place] [address] [facility] [facility] - [place], [place] - [address] [place] [address] PHARMACY #[id number] [place], [place] - [address] [place] [address]    Clinical concerns:      8 minutes for nursing intake (face to face time)     Kinjal Mcrae CMA              "

## 2018-10-18 NOTE — NURSING NOTE
Pre Op Nurse Teaching Template    Relevant Diagnosis: Ovarian cyst    Teaching Topic: laparoscopy removal of both ovaries and fallopian tubes, possible open, possible hysterectomy, possible cancer staging      Person(s) involved in teaching :  Patient    Motivation Level:  Asks Questions:    Yes      Eager to Learn:     Yes     Cooperative:          Yes    Receptive (willing. Able to accept information):    Yes      Patient and those who are listed above demonstrates understanding of the following:   Reason for the appointment, diagnosis and treatment plan:   Yes   Knowledge of proper use of medications and conditions for which they are ordered (with special attention to potential side effects or drug interactions): Yes   Which situations necessitate calling provider and whom to contact: Yes         Nutritional needs and diet plan:  Yes      Pain management techniques:     Yes, Pain Scale   Diet:   Yes, [facility] Diet Instructions    Teaching Concerns addressed: Yes    Infection Prevention:  Patient and those who are listed above demonstrate understanding of the following:  Pre-Op CHG Bathing Instructions: Yes  Surgical procedure site care taught:   Yes   Signs and symptoms of infection taught: Yes       Instructional Materials Used/Given:  The Bath Before You Surgery Booklet  Showering or Bathing before Surgery Instructions & CHG Product  Hysterectomy Guidelines  Pain Assessment Tool   Home Care after Major Abdominal or Vaginal Surgery  Map  Accommodations Brochure  Phone numbers for [facility] and [facility]  Copy of Surgical Consent    Comments:  RUBY Tan RN

## 2018-10-23 ENCOUNTER — TELEPHONE (OUTPATIENT)
Dept: ONCOLOGY | Facility: CLINIC | Age: 63
End: 2018-10-23

## 2018-10-23 NOTE — TELEPHONE ENCOUNTER
Patient called RN with many questions and concerns about surgery. She stated that she watched a surgery online and now she is more scared. Online it stated that you should avoid surgery at all cost.     RN answered all the patients questions and concerns to the best of her ability. RN offered patient a follow up appointment with Dr Hicks to discuss her concerns. Patient declined at this time and stated she would call back if she changed her mind.     Patient appreciative of the RN time.    Ninfa Tan RN

## 2018-11-20 NOTE — PATIENT INSTRUCTIONS
Call the imaging center at [phone] or  [phone] to schedule your liver MRI.    Did you know you can lower your blood pressure with your daily habits?    *Losing 20 pounds of weight lowers blood pressure 5 to 20 points.  *Eating a low-fat diet rich in fruits, vegetables and low-fat dairy lowers blood pressure 8 to 14 points.  *Eating a low-salt diet lowers blood pressure 2 to 8 points.  *Exercising regularly lowers blood pressure 4 to 9 points.  *Reducing alcohol use lowers blood pressure 2 to 4 points.      [facility]    If you have any questions regarding to your visit please contact your care team:       Team Red:   Clinic Hours Telephone Number   Dr. Rosanna Newby, NP 7am-7pm  Monday - Thursday   7am-5pm  Fridays  [phone]  (Appointment scheduling available 24/7)   Urgent Care - [place] - 11am-9pm Monday-Friday Saturday-Sunday- 9am-5pm   [place] - 5pm-9pm Monday-Friday Saturday-Sunday- 9am-5pm  [phone] - [place]  [phone] - [place]       What options do I have for a visit other than an office visit? We offer electronic visits (e-visits) and telephone visits, when medically appropriate.  Please check with your medical insurance to see if these types of visits are covered, as you will be responsible for any charges that are not paid by your insurance.      You can use Phone.com (secure electronic communication) to access to your chart, send your primary care provider a message, or make an appointment. Ask a team member how to get started.     For a price quote for your services, please call our Consumer Price Line at [phone] or our Imaging Cost estimation line at [phone] (for imaging tests).        Before Your Surgery      Call your surgeon if there is any change in your health. This includes signs of a cold or flu (such as a sore throat, runny nose, cough, rash or fever).    Do not  smoke, drink alcohol or take over the counter medicine (unless your surgeon or primary care doctor tells you to) for the 24 hours before and after surgery.    If you take prescribed drugs: Follow your doctor s orders about which medicines to take and which to stop until after surgery.    Eating and drinking prior to surgery: follow the instructions from your surgeon    Take a shower or bath the night before surgery. Use the soap your surgeon gave you to gently clean your skin. If you do not have soap from your surgeon, use your regular soap. Do not shave or scrub the surgery site.  Wear clean pajamas and have clean sheets on your bed.

## 2018-11-21 ENCOUNTER — OFFICE VISIT (OUTPATIENT)
Dept: FAMILY MEDICINE | Facility: CLINIC | Age: 63
End: 2018-11-21
Payer: COMMERCIAL

## 2018-11-21 VITALS
BODY MASS INDEX: 21.97 KG/M2 | OXYGEN SATURATION: 96 % | TEMPERATURE: 97.6 F | HEIGHT: 67 IN | RESPIRATION RATE: 12 BRPM | HEART RATE: 84 BPM | DIASTOLIC BLOOD PRESSURE: 88 MMHG | WEIGHT: 140 LBS | SYSTOLIC BLOOD PRESSURE: 148 MMHG

## 2018-11-21 DIAGNOSIS — J45.30 MILD PERSISTENT ASTHMA WITHOUT COMPLICATION: ICD-10-CM

## 2018-11-21 DIAGNOSIS — Z01.818 PREOP GENERAL PHYSICAL EXAM: Primary | ICD-10-CM

## 2018-11-21 DIAGNOSIS — N83.8 OVARIAN MASS, LEFT: ICD-10-CM

## 2018-11-21 DIAGNOSIS — Z12.31 VISIT FOR SCREENING MAMMOGRAM: ICD-10-CM

## 2018-11-21 DIAGNOSIS — I10 HYPERTENSION GOAL BP (BLOOD PRESSURE) < 150/90: ICD-10-CM

## 2018-11-21 DIAGNOSIS — R16.0 LIVER MASS: ICD-10-CM

## 2018-11-21 DIAGNOSIS — Z71.89 ADVANCED DIRECTIVES, COUNSELING/DISCUSSION: ICD-10-CM

## 2018-11-21 LAB — HGB BLD-MCNC: 13.9 G/DL (ref 11.7–15.7)

## 2018-11-21 PROCEDURE — 36415 COLL VENOUS BLD VENIPUNCTURE: CPT | Performed by: FAMILY MEDICINE

## 2018-11-21 PROCEDURE — 93000 ELECTROCARDIOGRAM COMPLETE: CPT | Performed by: FAMILY MEDICINE

## 2018-11-21 PROCEDURE — 99214 OFFICE O/P EST MOD 30 MIN: CPT | Performed by: FAMILY MEDICINE

## 2018-11-21 PROCEDURE — 85018 HEMOGLOBIN: CPT | Performed by: FAMILY MEDICINE

## 2018-11-21 RX ORDER — LISINOPRIL 10 MG/1
10 TABLET ORAL DAILY
Qty: 90 TABLET | Refills: 1 | Status: CANCELLED | OUTPATIENT
Start: 2018-11-21

## 2018-11-21 NOTE — MR AVS SNAPSHOT
After Visit Summary   11/21/2018    Wendy Francis    MRN: [id number]           Patient Information     Date Of Birth          1955        Visit Information        Provider Department      11/21/2018 7:40 AM Rosanna Lenz MD [facility]        Today's Diagnoses     Preop general physical exam    -  1    Ovarian mass, left        Hypertension goal BP (blood pressure) < 150/90        Mild persistent asthma without complication        Liver mass        Visit for screening mammogram        Advanced directives, counseling/discussion          Care Instructions    Call the imaging center at [phone] or  [phone] to schedule your liver MRI.    Did you know you can lower your blood pressure with your daily habits?    *Losing 20 pounds of weight lowers blood pressure 5 to 20 points.  *Eating a low-fat diet rich in fruits, vegetables and low-fat dairy lowers blood pressure 8 to 14 points.  *Eating a low-salt diet lowers blood pressure 2 to 8 points.  *Exercising regularly lowers blood pressure 4 to 9 points.  *Reducing alcohol use lowers blood pressure 2 to 4 points.      [facility]    If you have any questions regarding to your visit please contact your care team:       Team Red:   Clinic Hours Telephone Number   Dr. Rosanna Newby, NP 7am-7pm  Monday - Thursday   7am-5pm  Fridays  [phone]  (Appointment scheduling available 24/7)   [facility] - [place] - 11am-9pm Monday-Friday Saturday-Sunday- 9am-5pm   [place] - 5pm-9pm Monday-Friday Saturday-Sunday- 9am-5pm  [phone] - [place]  [phone] - [place]       What options do I have for a visit other than an office visit? We offer electronic visits (e-visits) and telephone visits, when medically appropriate.  Please check with your medical insurance to see if these types of visits are covered, as you will be responsible  for any charges that are not paid by your insurance.      You can use ProPerforma (secure electronic communication) to access to your chart, send your primary care provider a message, or make an appointment. Ask a team member how to get started.     For a price quote for your services, please call our Consumer Price Line at [phone] or our Imaging Cost estimation line at [phone] (for imaging tests).        Before Your Surgery      Call your surgeon if there is any change in your health. This includes signs of a cold or flu (such as a sore throat, runny nose, cough, rash or fever).    Do not smoke, drink alcohol or take over the counter medicine (unless your surgeon or primary care doctor tells you to) for the 24 hours before and after surgery.    If you take prescribed drugs: Follow your doctor s orders about which medicines to take and which to stop until after surgery.    Eating and drinking prior to surgery: follow the instructions from your surgeon    Take a shower or bath the night before surgery. Use the soap your surgeon gave you to gently clean your skin. If you do not have soap from your surgeon, use your regular soap. Do not shave or scrub the surgery site.  Wear clean pajamas and have clean sheets on your bed.           Follow-ups after your visit        Follow-up notes from your care team     Return in about 2 weeks (around 12/5/2018) for free nurse only blood pressure check and mammogram .      Your next 10 appointments already scheduled     Nov 30, 2018   Procedure with Genesis Hicks MD   [facility], [place], Same Day Surgery (--)    [address] [address] [phone]            Dec 13, 2018  2:20 PM CST   (Arrive by 2:05 PM)   Post-Op with Genesis Hicks MD   [facility]    [address]  [address]  [facility] [id number] [phone]              Future tests that were ordered for you today     Open Future Orders   "      Priority Expected Expires Ordered    MR Liver wo & w Contrast Routine  11/21/2019 11/21/2018    MA SCREENING DIGITAL BILAT - Future  (s+30) Routine  11/21/2019 11/21/2018            Who to contact     If you have questions or need follow up information about today's clinic visit or your schedule please contact [facility] directly at [phone].  Normal or non-critical lab and imaging results will be communicated to you by Canpageshart, letter or phone within 4 business days after the clinic has received the results. If you do not hear from us within 7 days, please contact the clinic through Big Rivert or phone. If you have a critical or abnormal lab result, we will notify you by phone as soon as possible.  Submit refill requests through CarRentalsMarket or call your pharmacy and they will forward the refill request to us. Please allow 3 business days for your refill to be completed.          Additional Information About Your Visit        CanpagesharJack in the Box Information     [employer]RentalsMarket gives you secure access to your electronic health record. If you see a primary care provider, you can also send messages to your care team and make appointments. If you have questions, please call your primary care clinic.  If you do not have a primary care provider, please call [phone] and they will assist you.        Care EveryWhere ID     This is your Care EveryWhere ID. This could be used by other organizations to access your [facility] medical records  [id number]        Your Vitals Were     Pulse Temperature Respirations Height Pulse Oximetry Breastfeeding?    84 97.6  F (36.4  C) (Oral) 12 5' 6.5\" (1.689 m) 96% No    BMI (Body Mass Index)                   22.26 kg/m2            Blood Pressure from Last 3 Encounters:   11/21/18 (!) 136/96   10/18/18 159/81   09/28/18 138/88    Weight from Last 3 Encounters:   11/21/18 140 lb (63.5 kg)   10/18/18 143 lb 4.8 oz (65 kg)   09/28/18 140 lb 12.8 oz (63.9 kg)              We Performed the " Following     EKG 12-lead complete w/read - Clinics     Hemoglobin        Primary Care Provider Office Phone # Fax #    Rosanna Lenz [phone] [address] [facility] [address]        Equal Access to Services     SANDHYAARMIDA REG : Hadii aad ku hadasho Soomaali, waaxda luqadaha, qaybta kaalmada adeegyada, waxperla idiin hayjose migueln adeadele zhu laMigeljessica joy. So [facility] [phone].    ATENCIÓN: Si habla español, tiene a wolff disposición servicios gratuitos de asistencia lingüística. Llame al [phone].    We comply with applicable federal civil rights laws and [place] laws. We do not discriminate on the basis of race, color, national origin, age, disability, sex, sexual orientation, or gender identity.            Thank you!     Thank you for choosing [facility]  for your care. Our goal is always to provide you with excellent care. Hearing back from our patients is one way we can continue to improve our services. Please take a few minutes to complete the written survey that you may receive in the mail after your visit with us. Thank you!             Your Updated Medication List - Protect others around you: Learn how to safely use, store and throw away your medicines at www.disposemymeds.org.          This list is accurate as of 11/21/18  8:23 AM.  Always use your most recent med list.                   Brand Name Dispense Instructions for use Diagnosis    albuterol 108 (90 Base) MCG/ACT inhaler    PROAIR HFA/PROVENTIL HFA/VENTOLIN HFA    1 Inhaler    Inhale 2 puffs into the lungs every 4 hours as needed for shortness of breath / dyspnea    Mild persistent asthma without complication       budesonide-formoterol 80-4.5 MCG/ACT Inhaler    SYMBICORT    3 Inhaler    Inhale 2 puffs into the lungs 2 times daily    Mild persistent asthma without complication       cetirizine 10 MG tablet    zyrTEC     Take 1 tablet by mouth daily as needed for allergies.        Fish Oil 500 MG Caps       Take  by mouth. 1 daily        ipratropium - albuterol 0.5 mg/2.5 mg/3 mL 0.5-2.5 (3) MG/3ML neb solution    DUONEB    30 vial    Take 1 vial (3 mLs) by nebulization every 6 hours as needed for shortness of breath / dyspnea or wheezing    Mild intermittent asthma with exacerbation       MULTI-VITAMIN PO      DAILY        NASACORT ALLERGY 24HR NA           tolterodine 4 MG 24 hr capsule    DETROL LA    90 capsule    Take 1 capsule (4 mg) by mouth daily    Overactive bladder       triamcinolone 0.1 % cream    KENALOG    30 g    Apply to affected area sparingly twice daily as needed    Dermatitis

## 2018-11-21 NOTE — PROGRESS NOTES
[facility]  [address] [place] [address]  [place] [address]  [phone]  Dept: [phone]    PRE-OP EVALUATION:  Today's date: 2018    Wendy Francis (: 1955) presents for pre-operative evaluation assessment as requested by Dr. Esmer Hicks.  She requires evaluation and anesthesia risk assessment prior to undergoing surgery/procedure for treatment of Ovarian cyst .    Fax number for surgical facility:    Primary Physician: Rosanna Lenz  Type of Anesthesia Anticipated: General    Patient has a Health Care Directive or Living Will:  NO    Preop Questions 2018   Who is doing your surgery? marlee hicks   What are you having done? ovarian cyst removed left side   Date of Surgery/Procedure: 2018   Facility or Hospital where procedure/surgery will be performed: [facility]   1.  Do you have a history of Heart attack, stroke, stent, coronary bypass surgery, or other heart surgery? No   2.  Do you ever have any pain or discomfort in your chest? No   3.  Do you have a history of  Heart Failure? No   4.   Are you troubled by shortness of breath when:  walking on a level surface, or up a slight hill, or at night? No   5.  Do you currently have a cold, bronchitis or other respiratory infection? No   6.  Do you have a cough, shortness of breath, or wheezing? No   7.  Do you sometimes get pains in the calves of your legs when you walk? No   8. Do you or anyone in your family have previous history of blood clots? No   9.  Do you or does anyone in your family have a serious bleeding problem such as prolonged bleeding following surgeries or cuts? No   10. Have you ever had problems with anemia or been told to take iron pills? No   11. Have you had any abnormal blood loss such as black, tarry or bloody stools, or abnormal vaginal bleeding? No   12. Have you ever had a blood transfusion? No   13. Have you or any of your relatives ever had problems with anesthesia? No   14. Do you have  sleep apnea, excessive snoring or daytime drowsiness? No   15. Do you have any prosthetic heart valves? No   16. Do you have prosthetic joints? No   17. Is there any chance that you may be pregnant? No     HPI:   Wendy Francis is a 63 year old female who presents with ovarian mass by ultrasound, asymptomatic. Symptom onset has been unchanged for a time period of 3 months. Severity is described as none. Course of her symptoms over time is unchanged.    See problem list for active medical problems.  Problems all longstanding and stable, except as noted/documented.  See ROS for pertinent symptoms related to these conditions.                                                                                                                                                          .    MEDICAL HISTORY:     Patient Active Problem List    Diagnosis Date Noted     Ovarian mass, left      Priority: High     Liver mass      Priority: High     Hypertension goal BP (blood pressure) < 150/90      Priority: High     Mild persistent asthma      Priority: High     Nasal polyposis 02/07/2013     Priority: Medium     History of cervical cancer 09/23/2010     Priority: Medium     2000       Overactive bladder      Priority: Medium     Advanced directives, counseling/discussion 08/18/2011     Priority: Low     Advanced directive materials discussed and given to patient.         Allergic rhinitis      Priority: Low     molds, dust; claritin ineffective       CARDIOVASCULAR SCREENING; LDL GOAL LESS THAN 160 10/31/2010     Priority: Low      Past Medical History:   Diagnosis Date     Allergic rhinitis     molds, dust     Atrophic vaginitis      Cervical cancer (H) 4/10/2000    chemo and radiation, IB-II squamous cell, positive nodes     Diverticulosis of colon 6/2004     Hypertension goal BP (blood pressure) < 140/90      Liver mass      Mild persistent asthma      Mild recurrent major depression (H)      Nasal polyps      Ovarian mass,  left      Overactive bladder      Sensorineural hearing losses      Tinnitus      Varicose vein of leg     No surgery     Past Surgical History:   Procedure Laterality Date     COLONOSCOPY WITH CO2 INSUFFLATION N/A 4/12/2017    Procedure: COLONOSCOPY WITH CO2 INSUFFLATION;  Surgeon: Anatoly Do MD;  Location: [facility] OR     COLPOSCOPY,LOOP ELECTRD CERVIX EXCIS  2000    Radiation & chemotherapy for cervical cancer. Diagnosed in 2000     HC CORRECT BUNION,SIMPLE  over 10 years ago    ESTEPHANIE     SINUS SURGERY      x 2, Nasal polyps removed     Current Outpatient Prescriptions   Medication Sig Dispense Refill     albuterol (PROAIR HFA/PROVENTIL HFA/VENTOLIN HFA) 108 (90 BASE) MCG/ACT Inhaler Inhale 2 puffs into the lungs every 4 hours as needed for shortness of breath / dyspnea 1 Inhaler 4     budesonide-formoterol (SYMBICORT) 80-4.5 MCG/ACT Inhaler Inhale 2 puffs into the lungs 2 times daily 3 Inhaler 3     cetirizine (ZYRTEC) 10 MG tablet Take 1 tablet by mouth daily as needed for allergies.       ipratropium - albuterol 0.5 mg/2.5 mg/3 mL (DUONEB) 0.5-2.5 (3) MG/3ML neb solution Take 1 vial (3 mLs) by nebulization every 6 hours as needed for shortness of breath / dyspnea or wheezing 30 vial 0     MULTI-VITAMIN OR DAILY       Omega-3 Fatty Acids (FISH OIL) 500 MG CAPS Take  by mouth. 1 daily         tolterodine (DETROL LA) 4 MG 24 hr capsule Take 1 capsule (4 mg) by mouth daily 90 capsule 3     triamcinolone (KENALOG) 0.1 % cream Apply to affected area sparingly twice daily as needed 30 g 1     Triamcinolone Acetonide (NASACORT ALLERGY 24HR NA)        OTC products: None, except as noted above    Allergies   Allergen Reactions     Citalopram      Thinking changes     Flonase [Fluticasone Propionate]      Sulfa Drugs Rash     Sulfasalazine Rash      Latex Allergy: NO    Social History   Substance Use Topics     Smoking status: Never Smoker     Smokeless tobacco: Never Used     Alcohol use 1.0 oz/week      Comment:  1-2 a month     History   Drug Use No     Social History     Social History     Marital status:      Spouse name: Gerry     Number of children: 0     Years of education: 14     Occupational History     office work Other     [employer]     Social History Main Topics     Smoking status: Never Smoker     Smokeless tobacco: Never Used     Alcohol use 1.0 oz/week      Comment: 1-2 a month     Drug use: No     Sexual activity: Not Currently     Partners: Male     Birth control/ protection: Post-menopausal     Other Topics Concern      Service No     Blood Transfusions No     Caffeine Concern No     Occupational Exposure No     Hobby Hazards No     Sleep Concern No     Stress Concern Yes     marital stress, as patient is not sexually active.     Weight Concern No     Special Diet No     Back Care No     Exercise Yes     gym 3-4 x week, aerobics, weight machines.     Bike Helmet No     she will purchase one.     Seat Belt Yes     Self-Exams Yes     occasional     Parent/Sibling W/ Cabg, Mi Or Angioplasty Before 65f 55m? No     Social History Narrative    She and her  have been taking lessons for dancing.    Hobby of dancing, go to [place] to dance.          Family History   Problem Relation Age of Onset     Cancer Maternal Grandmother      pelvic     Ovarian Cancer Maternal Grandmother      Neurologic Disorder Sister      brain aneurysm     Hypertension Father      Asthma Father      Pacemaker Father      Diabetes Maternal Grandfather      Allergies Brother      Cerebrovascular Disease Sister      C.A.D. No family hx of       REVIEW OF SYSTEMS:   CONSTITUTIONAL: NEGATIVE for fever, chills, change in weight  INTEGUMENTARY/SKIN: NEGATIVE for worrisome rashes, moles or lesions  EYES: NEGATIVE for vision changes or irritation  ENT/MOUTH: NEGATIVE for ear, mouth and throat problems  RESP:Hx asthma  CV: NEGATIVE for chest pain, palpitations or peripheral edema  GI: NEGATIVE for nausea, abdominal pain,  "heartburn, or change in bowel habits  : NEGATIVE for frequency, dysuria, or hematuria  MUSCULOSKELETAL: NEGATIVE for significant arthralgias or myalgia  NEURO: NEGATIVE for weakness, dizziness or paresthesias  ENDOCRINE: NEGATIVE for temperature intolerance, skin/hair changes  HEME: NEGATIVE for bleeding problems  PSYCHIATRIC: NEGATIVE for changes in mood or affect  Complete ROS otherwise negative.     EXAM:   BP (!) 136/96  Pulse 84  Temp 97.6  F (36.4  C) (Oral)  Resp 12  Ht 5' 6.5\" (1.689 m)  Wt 140 lb (63.5 kg)  SpO2 96%  Breastfeeding? No  BMI 22.26 kg/m2    GENERAL APPEARANCE: healthy, alert and no distress     EYES: EOMI, PERRL     HENT: ear canals and TM's normal and nose and mouth without ulcers or lesions     NECK: no adenopathy, no asymmetry, masses, or scars and thyroid normal to palpation     RESP: lungs clear to auscultation - no rales, rhonchi or wheezes     CV: regular rates and rhythm, normal S1 S2, no S3 or S4 and no murmur, click or rub     ABDOMEN:  soft, nontender, no HSM or masses and bowel sounds normal     MS: extremities normal- no gross deformities noted, no evidence of inflammation in joints, FROM in all extremities.     SKIN: no suspicious lesions or rashes     NEURO: Normal strength and tone, sensory exam grossly normal, mentation intact and speech normal     PSYCH: mentation appears normal. and affect normal/bright     LYMPHATICS: No cervical adenopathy    DIAGNOSTICS:   EKG: appears normal, NSR, normal axis, normal intervals, no acute ST/T changes c/w ischemia, no LVH by voltage criteria, unchanged from previous tracings    Recent Labs   Lab Test  04/05/17   0752   HGB  15.2     IMPRESSION:   Reason for surgery/procedure: ovarian cyst   Diagnosis/reason for consult: hypertension, asthma     The proposed surgical procedure is considered INTERMEDIATE risk.    REVISED CARDIAC RISK INDEX  The patient has the following serious cardiovascular risks for perioperative complications " such as (MI, PE, VFib and 3  AV Block):  No serious cardiac risks  INTERPRETATION: 0 risks: Class I (very low risk - 0.4% complication rate)    The patient has the following additional risks for perioperative complications:  No identified additional risks      ICD-10-CM    1. Preop general physical exam Z01.818 Hemoglobin   2. Ovarian mass, left N83.9    3. Hypertension goal BP (blood pressure) < 150/90 I10    4. Mild persistent asthma without complication J45.30    5. Liver mass R16.0 MR Liver wo & w Contrast   6. Visit for screening mammogram Z12.31 MA SCREENING DIGITAL BILAT - Future  (s+30)   7. Advanced directives, counseling/discussion Z71.89      RECOMMENDATIONS:   --Patient is to take all scheduled medications on the day of surgery EXCEPT for modifications listed below.    APPROVAL GIVEN to proceed with proposed procedure, without further diagnostic evaluation       Signed Electronically by: Rosanna Lenz MD    Copy of this evaluation report is provided to requesting physician.    [place] Preop Guidelines    Revised Cardiac Risk Index

## 2018-11-22 ASSESSMENT — ASTHMA QUESTIONNAIRES: ACT_TOTALSCORE: 22

## 2018-11-29 ENCOUNTER — ANESTHESIA EVENT (OUTPATIENT)
Dept: SURGERY | Facility: CLINIC | Age: 63
End: 2018-11-29
Payer: COMMERCIAL

## 2018-11-30 ENCOUNTER — ANESTHESIA (OUTPATIENT)
Dept: SURGERY | Facility: CLINIC | Age: 63
End: 2018-11-30
Payer: COMMERCIAL

## 2018-11-30 ENCOUNTER — HOSPITAL ENCOUNTER (OUTPATIENT)
Facility: CLINIC | Age: 63
Discharge: HOME | End: 2018-11-30
Attending: OBSTETRICS & GYNECOLOGY | Admitting: OBSTETRICS & GYNECOLOGY
Payer: COMMERCIAL

## 2018-11-30 ENCOUNTER — SURGERY (OUTPATIENT)
Age: 63
End: 2018-11-30
Payer: COMMERCIAL

## 2018-11-30 VITALS
RESPIRATION RATE: 14 BRPM | BODY MASS INDEX: 22.32 KG/M2 | TEMPERATURE: 97.7 F | WEIGHT: 138.89 LBS | HEART RATE: 84 BPM | SYSTOLIC BLOOD PRESSURE: 159 MMHG | HEIGHT: 66 IN | DIASTOLIC BLOOD PRESSURE: 84 MMHG | OXYGEN SATURATION: 98 %

## 2018-11-30 DIAGNOSIS — Z90.722 S/P BSO (BILATERAL SALPINGO-OOPHORECTOMY): Primary | ICD-10-CM

## 2018-11-30 DIAGNOSIS — N83.202 CYST OF LEFT OVARY: ICD-10-CM

## 2018-11-30 LAB
ABO + RH BLD: NORMAL
ABO + RH BLD: NORMAL
BLD GP AB SCN SERPL QL: NORMAL
BLOOD BANK CMNT PATIENT-IMP: NORMAL
CREAT SERPL-MCNC: 0.89 MG/DL (ref 0.52–1.04)
GFR SERPL CREATININE-BSD FRML MDRD: 64 ML/MIN/1.7M2
GLUCOSE BLDC GLUCOMTR-MCNC: 100 MG/DL (ref 70–99)
HGB BLD-MCNC: 13.2 G/DL (ref 11.7–15.7)
POTASSIUM SERPL-SCNC: 3.4 MMOL/L (ref 3.4–5.3)
SPECIMEN EXP DATE BLD: NORMAL

## 2018-11-30 PROCEDURE — 58661 LAPAROSCOPY REMOVE ADNEXA: CPT | Mod: GC | Performed by: OBSTETRICS & GYNECOLOGY

## 2018-11-30 PROCEDURE — 36000057 ZZH SURGERY LEVEL 3 1ST 30 MIN - UMMC: Performed by: OBSTETRICS & GYNECOLOGY

## 2018-11-30 PROCEDURE — 25000132 ZZH RX MED GY IP 250 OP 250 PS 637: Performed by: OBSTETRICS & GYNECOLOGY

## 2018-11-30 PROCEDURE — 25000125 ZZHC RX 250: Performed by: ANESTHESIOLOGY

## 2018-11-30 PROCEDURE — 88112 CYTOPATH CELL ENHANCE TECH: CPT | Performed by: OBSTETRICS & GYNECOLOGY

## 2018-11-30 PROCEDURE — 82962 GLUCOSE BLOOD TEST: CPT

## 2018-11-30 PROCEDURE — 88331 PATH CONSLTJ SURG 1 BLK 1SPC: CPT | Performed by: OBSTETRICS & GYNECOLOGY

## 2018-11-30 PROCEDURE — C9290 INJ, BUPIVACAINE LIPOSOME: HCPCS | Performed by: STUDENT IN AN ORGANIZED HEALTH CARE EDUCATION/TRAINING PROGRAM

## 2018-11-30 PROCEDURE — 25000125 ZZHC RX 250: Performed by: NURSE ANESTHETIST, CERTIFIED REGISTERED

## 2018-11-30 PROCEDURE — 25000565 ZZH ISOFLURANE, EA 15 MIN: Performed by: OBSTETRICS & GYNECOLOGY

## 2018-11-30 PROCEDURE — 25000128 H RX IP 250 OP 636: Performed by: NURSE ANESTHETIST, CERTIFIED REGISTERED

## 2018-11-30 PROCEDURE — 86900 BLOOD TYPING SEROLOGIC ABO: CPT | Performed by: ANESTHESIOLOGY

## 2018-11-30 PROCEDURE — 00000155 ZZHCL STATISTIC H-CELL BLOCK W/STAIN: Performed by: OBSTETRICS & GYNECOLOGY

## 2018-11-30 PROCEDURE — 36415 COLL VENOUS BLD VENIPUNCTURE: CPT | Performed by: OBSTETRICS & GYNECOLOGY

## 2018-11-30 PROCEDURE — 25000128 H RX IP 250 OP 636: Performed by: STUDENT IN AN ORGANIZED HEALTH CARE EDUCATION/TRAINING PROGRAM

## 2018-11-30 PROCEDURE — 71000027 ZZH RECOVERY PHASE 2 EACH 15 MINS: Performed by: OBSTETRICS & GYNECOLOGY

## 2018-11-30 PROCEDURE — 86901 BLOOD TYPING SEROLOGIC RH(D): CPT | Performed by: ANESTHESIOLOGY

## 2018-11-30 PROCEDURE — 25000132 ZZH RX MED GY IP 250 OP 250 PS 637: Performed by: STUDENT IN AN ORGANIZED HEALTH CARE EDUCATION/TRAINING PROGRAM

## 2018-11-30 PROCEDURE — 37000009 ZZH ANESTHESIA TECHNICAL FEE, EACH ADDTL 15 MIN: Performed by: OBSTETRICS & GYNECOLOGY

## 2018-11-30 PROCEDURE — 25000128 H RX IP 250 OP 636: Performed by: ANESTHESIOLOGY

## 2018-11-30 PROCEDURE — 37000008 ZZH ANESTHESIA TECHNICAL FEE, 1ST 30 MIN: Performed by: OBSTETRICS & GYNECOLOGY

## 2018-11-30 PROCEDURE — 25000128 H RX IP 250 OP 636: Performed by: OBSTETRICS & GYNECOLOGY

## 2018-11-30 PROCEDURE — 88307 TISSUE EXAM BY PATHOLOGIST: CPT | Performed by: OBSTETRICS & GYNECOLOGY

## 2018-11-30 PROCEDURE — 27210794 ZZH OR GENERAL SUPPLY STERILE: Performed by: OBSTETRICS & GYNECOLOGY

## 2018-11-30 PROCEDURE — 71000014 ZZH RECOVERY PHASE 1 LEVEL 2 FIRST HR: Performed by: OBSTETRICS & GYNECOLOGY

## 2018-11-30 PROCEDURE — 88305 TISSUE EXAM BY PATHOLOGIST: CPT | Performed by: OBSTETRICS & GYNECOLOGY

## 2018-11-30 PROCEDURE — 86850 RBC ANTIBODY SCREEN: CPT | Performed by: ANESTHESIOLOGY

## 2018-11-30 PROCEDURE — 84132 ASSAY OF SERUM POTASSIUM: CPT | Performed by: OBSTETRICS & GYNECOLOGY

## 2018-11-30 PROCEDURE — 85018 HEMOGLOBIN: CPT | Performed by: OBSTETRICS & GYNECOLOGY

## 2018-11-30 PROCEDURE — 36000059 ZZH SURGERY LEVEL 3 EA 15 ADDTL MIN UMMC: Performed by: OBSTETRICS & GYNECOLOGY

## 2018-11-30 PROCEDURE — 82565 ASSAY OF CREATININE: CPT | Performed by: OBSTETRICS & GYNECOLOGY

## 2018-11-30 PROCEDURE — 40000171 ZZH STATISTIC PRE-PROCEDURE ASSESSMENT III: Performed by: OBSTETRICS & GYNECOLOGY

## 2018-11-30 RX ORDER — ALBUTEROL SULFATE 0.83 MG/ML
2.5 SOLUTION RESPIRATORY (INHALATION) EVERY 4 HOURS PRN
Status: DISCONTINUED | OUTPATIENT
Start: 2018-11-30 | End: 2018-11-30 | Stop reason: HOSPADM

## 2018-11-30 RX ORDER — DEXAMETHASONE SODIUM PHOSPHATE 4 MG/ML
INJECTION, SOLUTION INTRA-ARTICULAR; INTRALESIONAL; INTRAMUSCULAR; INTRAVENOUS; SOFT TISSUE PRN
Status: DISCONTINUED | OUTPATIENT
Start: 2018-11-30 | End: 2018-11-30

## 2018-11-30 RX ORDER — LIDOCAINE 40 MG/G
CREAM TOPICAL
Status: DISCONTINUED | OUTPATIENT
Start: 2018-11-30 | End: 2018-11-30 | Stop reason: HOSPADM

## 2018-11-30 RX ORDER — FENTANYL CITRATE 50 UG/ML
INJECTION, SOLUTION INTRAMUSCULAR; INTRAVENOUS PRN
Status: DISCONTINUED | OUTPATIENT
Start: 2018-11-30 | End: 2018-11-30

## 2018-11-30 RX ORDER — ONDANSETRON 4 MG/1
4 TABLET, ORALLY DISINTEGRATING ORAL EVERY 30 MIN PRN
Status: DISCONTINUED | OUTPATIENT
Start: 2018-11-30 | End: 2018-11-30 | Stop reason: HOSPADM

## 2018-11-30 RX ORDER — OXYCODONE HYDROCHLORIDE 5 MG/1
5 TABLET ORAL
Status: COMPLETED | OUTPATIENT
Start: 2018-11-30 | End: 2018-11-30

## 2018-11-30 RX ORDER — HEPARIN SODIUM 5000 [USP'U]/.5ML
5000 INJECTION, SOLUTION INTRAVENOUS; SUBCUTANEOUS
Status: DISCONTINUED | OUTPATIENT
Start: 2018-11-30 | End: 2018-11-30 | Stop reason: HOSPADM

## 2018-11-30 RX ORDER — PHENAZOPYRIDINE HYDROCHLORIDE 200 MG/1
200 TABLET, FILM COATED ORAL ONCE
Status: COMPLETED | OUTPATIENT
Start: 2018-11-30 | End: 2018-11-30

## 2018-11-30 RX ORDER — FLUMAZENIL 0.1 MG/ML
0.2 INJECTION, SOLUTION INTRAVENOUS
Status: DISCONTINUED | OUTPATIENT
Start: 2018-11-30 | End: 2018-11-30 | Stop reason: HOSPADM

## 2018-11-30 RX ORDER — HYDROMORPHONE HYDROCHLORIDE 1 MG/ML
.3-.5 INJECTION, SOLUTION INTRAMUSCULAR; INTRAVENOUS; SUBCUTANEOUS EVERY 5 MIN PRN
Status: DISCONTINUED | OUTPATIENT
Start: 2018-11-30 | End: 2018-11-30 | Stop reason: HOSPADM

## 2018-11-30 RX ORDER — CEFAZOLIN SODIUM 1 G/3ML
1 INJECTION, POWDER, FOR SOLUTION INTRAMUSCULAR; INTRAVENOUS SEE ADMIN INSTRUCTIONS
Status: DISCONTINUED | OUTPATIENT
Start: 2018-11-30 | End: 2018-11-30 | Stop reason: HOSPADM

## 2018-11-30 RX ORDER — NALOXONE HYDROCHLORIDE 0.4 MG/ML
.1-.4 INJECTION, SOLUTION INTRAMUSCULAR; INTRAVENOUS; SUBCUTANEOUS
Status: DISCONTINUED | OUTPATIENT
Start: 2018-11-30 | End: 2018-11-30 | Stop reason: HOSPADM

## 2018-11-30 RX ORDER — AMOXICILLIN 250 MG
1-2 CAPSULE ORAL 2 TIMES DAILY PRN
Qty: 30 TABLET | Refills: 0 | Status: SHIPPED | OUTPATIENT
Start: 2018-11-30 | End: 2019-11-20

## 2018-11-30 RX ORDER — IPRATROPIUM BROMIDE AND ALBUTEROL SULFATE 2.5; .5 MG/3ML; MG/3ML
3 SOLUTION RESPIRATORY (INHALATION) ONCE
Status: COMPLETED | OUTPATIENT
Start: 2018-11-30 | End: 2018-11-30

## 2018-11-30 RX ORDER — FENTANYL CITRATE 50 UG/ML
25-50 INJECTION, SOLUTION INTRAMUSCULAR; INTRAVENOUS
Status: DISCONTINUED | OUTPATIENT
Start: 2018-11-30 | End: 2018-11-30 | Stop reason: HOSPADM

## 2018-11-30 RX ORDER — OXYCODONE AND ACETAMINOPHEN 5; 325 MG/1; MG/1
1-2 TABLET ORAL EVERY 4 HOURS PRN
Qty: 15 TABLET | Refills: 0 | Status: SHIPPED | OUTPATIENT
Start: 2018-11-30 | End: 2019-01-09

## 2018-11-30 RX ORDER — SODIUM CHLORIDE, SODIUM LACTATE, POTASSIUM CHLORIDE, CALCIUM CHLORIDE 600; 310; 30; 20 MG/100ML; MG/100ML; MG/100ML; MG/100ML
INJECTION, SOLUTION INTRAVENOUS CONTINUOUS
Status: DISCONTINUED | OUTPATIENT
Start: 2018-11-30 | End: 2018-11-30 | Stop reason: HOSPADM

## 2018-11-30 RX ORDER — BUPIVACAINE HYDROCHLORIDE AND EPINEPHRINE 2.5; 5 MG/ML; UG/ML
INJECTION, SOLUTION INFILTRATION; PERINEURAL PRN
Status: DISCONTINUED | OUTPATIENT
Start: 2018-11-30 | End: 2018-11-30

## 2018-11-30 RX ORDER — CEFAZOLIN SODIUM 2 G/100ML
2 INJECTION, SOLUTION INTRAVENOUS
Status: COMPLETED | OUTPATIENT
Start: 2018-11-30 | End: 2018-11-30

## 2018-11-30 RX ORDER — LABETALOL HYDROCHLORIDE 5 MG/ML
10 INJECTION, SOLUTION INTRAVENOUS
Status: DISCONTINUED | OUTPATIENT
Start: 2018-11-30 | End: 2018-11-30 | Stop reason: HOSPADM

## 2018-11-30 RX ORDER — ONDANSETRON 2 MG/ML
4 INJECTION INTRAMUSCULAR; INTRAVENOUS EVERY 30 MIN PRN
Status: DISCONTINUED | OUTPATIENT
Start: 2018-11-30 | End: 2018-11-30 | Stop reason: HOSPADM

## 2018-11-30 RX ORDER — LIDOCAINE HYDROCHLORIDE 20 MG/ML
INJECTION, SOLUTION INFILTRATION; PERINEURAL PRN
Status: DISCONTINUED | OUTPATIENT
Start: 2018-11-30 | End: 2018-11-30

## 2018-11-30 RX ORDER — ONDANSETRON 2 MG/ML
INJECTION INTRAMUSCULAR; INTRAVENOUS PRN
Status: DISCONTINUED | OUTPATIENT
Start: 2018-11-30 | End: 2018-11-30

## 2018-11-30 RX ORDER — PROPOFOL 10 MG/ML
INJECTION, EMULSION INTRAVENOUS PRN
Status: DISCONTINUED | OUTPATIENT
Start: 2018-11-30 | End: 2018-11-30

## 2018-11-30 RX ADMIN — LIDOCAINE HYDROCHLORIDE 100 MG: 20 INJECTION, SOLUTION INFILTRATION; PERINEURAL at 08:06

## 2018-11-30 RX ADMIN — SUGAMMADEX 150 MG: 100 INJECTION, SOLUTION INTRAVENOUS at 10:10

## 2018-11-30 RX ADMIN — ONDANSETRON 4 MG: 2 INJECTION INTRAMUSCULAR; INTRAVENOUS at 09:44

## 2018-11-30 RX ADMIN — SODIUM CHLORIDE, POTASSIUM CHLORIDE, SODIUM LACTATE AND CALCIUM CHLORIDE: 600; 310; 30; 20 INJECTION, SOLUTION INTRAVENOUS at 07:52

## 2018-11-30 RX ADMIN — DEXAMETHASONE SODIUM PHOSPHATE 6 MG: 4 INJECTION, SOLUTION INTRA-ARTICULAR; INTRALESIONAL; INTRAMUSCULAR; INTRAVENOUS; SOFT TISSUE at 08:19

## 2018-11-30 RX ADMIN — BUPIVACAINE 20 ML: 13.3 INJECTION, SUSPENSION, LIPOSOMAL INFILTRATION at 07:14

## 2018-11-30 RX ADMIN — OXYCODONE HYDROCHLORIDE 5 MG: 5 TABLET ORAL at 11:33

## 2018-11-30 RX ADMIN — PHENYLEPHRINE HYDROCHLORIDE 100 MCG: 10 INJECTION, SOLUTION INTRAMUSCULAR; INTRAVENOUS; SUBCUTANEOUS at 08:22

## 2018-11-30 RX ADMIN — BUPIVACAINE HYDROCHLORIDE AND EPINEPHRINE BITARTRATE 20 ML: 2.5; .005 INJECTION, SOLUTION INFILTRATION; PERINEURAL at 07:14

## 2018-11-30 RX ADMIN — PHENAZOPYRIDINE HYDROCHLORIDE 200 MG: 200 TABLET, FILM COATED ORAL at 06:16

## 2018-11-30 RX ADMIN — ROCURONIUM BROMIDE 50 MG: 10 INJECTION INTRAVENOUS at 08:07

## 2018-11-30 RX ADMIN — IPRATROPIUM BROMIDE AND ALBUTEROL SULFATE 3 ML: .5; 3 SOLUTION RESPIRATORY (INHALATION) at 07:23

## 2018-11-30 RX ADMIN — FENTANYL CITRATE 50 MCG: 50 INJECTION, SOLUTION INTRAMUSCULAR; INTRAVENOUS at 08:33

## 2018-11-30 RX ADMIN — SODIUM CHLORIDE, POTASSIUM CHLORIDE, SODIUM LACTATE AND CALCIUM CHLORIDE: 600; 310; 30; 20 INJECTION, SOLUTION INTRAVENOUS at 09:07

## 2018-11-30 RX ADMIN — CEFAZOLIN SODIUM 2 G: 2 INJECTION, SOLUTION INTRAVENOUS at 08:15

## 2018-11-30 RX ADMIN — FENTANYL CITRATE 100 MCG: 50 INJECTION, SOLUTION INTRAMUSCULAR; INTRAVENOUS at 08:06

## 2018-11-30 RX ADMIN — ROCURONIUM BROMIDE 20 MG: 10 INJECTION INTRAVENOUS at 08:36

## 2018-11-30 RX ADMIN — MIDAZOLAM 2 MG: 1 INJECTION INTRAMUSCULAR; INTRAVENOUS at 07:52

## 2018-11-30 RX ADMIN — PROPOFOL 120 MG: 10 INJECTION, EMULSION INTRAVENOUS at 08:06

## 2018-11-30 RX ADMIN — FENTANYL CITRATE 25 MCG: 50 INJECTION, SOLUTION INTRAMUSCULAR; INTRAVENOUS at 07:17

## 2018-11-30 RX ADMIN — MIDAZOLAM 1 MG: 1 INJECTION INTRAMUSCULAR; INTRAVENOUS at 07:17

## 2018-11-30 RX ADMIN — ROCURONIUM BROMIDE 20 MG: 10 INJECTION INTRAVENOUS at 09:15

## 2018-11-30 RX ADMIN — PHENYLEPHRINE HYDROCHLORIDE 200 MCG: 10 INJECTION, SOLUTION INTRAMUSCULAR; INTRAVENOUS; SUBCUTANEOUS at 08:27

## 2018-11-30 RX ADMIN — FENTANYL CITRATE 50 MCG: 50 INJECTION, SOLUTION INTRAMUSCULAR; INTRAVENOUS at 09:26

## 2018-11-30 RX ADMIN — FENTANYL CITRATE 100 MCG: 50 INJECTION, SOLUTION INTRAMUSCULAR; INTRAVENOUS at 08:38

## 2018-11-30 ASSESSMENT — PAIN DESCRIPTION - DESCRIPTORS: DESCRIPTORS: ACHING

## 2018-11-30 ASSESSMENT — ASTHMA QUESTIONNAIRES: QUESTION_5 LAST FOUR WEEKS HOW WOULD YOU RATE YOUR ASTHMA CONTROL: WELL CONTROLLED

## 2018-11-30 ASSESSMENT — ENCOUNTER SYMPTOMS: APNEA: 0

## 2018-11-30 NOTE — DISCHARGE INSTRUCTIONS
[facility]  Same-Day Surgery   Adult Discharge Orders & Instructions     For 24 hours after surgery    1. Get plenty of rest.  A responsible adult must stay with you for at least 24 hours after you leave the hospital.   2. Do not drive or use heavy equipment.  If you have weakness or tingling, don't drive or use heavy equipment until this feeling goes away.  3. Do not drink alcohol.  4. Avoid strenuous or risky activities.  Ask for help when climbing stairs.   5. You may feel lightheaded.  IF so, sit for a few minutes before standing.  Have someone help you get up.   6. If you have nausea (feel sick to your stomach): Drink only clear liquids such as apple juice, ginger ale, broth or 7-Up.  Rest may also help.  Be sure to drink enough fluids.  Move to a regular diet as you feel able.  7. You may have a slight fever. Call the doctor if your fever is over 100 F (37.7 C) (taken under the tongue) or lasts longer than 24 hours.  8. You may have a dry mouth, a sore throat, muscle aches or trouble sleeping.  These should go away after 24 hours.  9. Do not make important or legal decisions.   Call your doctor for any of the followin.  Signs of infection (fever, growing tenderness at the surgery site, a large amount of drainage or bleeding, severe pain, foul-smelling drainage, redness, swelling).    2. It has been over 8 to 10 hours since surgery and you are still not able to urinate (pass water).    3.  Headache for over 24 hours.    To contact a doctor, call Dr Hicks's office @ [phone] or:        [phone] and ask for the resident on call for   GYN/ONC (answered 24 hours a day)      Emergency Department:    [facility]: [phone]       (TTY for hearing impaired: [phone])

## 2018-11-30 NOTE — ANESTHESIA PREPROCEDURE EVALUATION
Anesthesia Pre-Procedure Evaluation    Patient: Wendy Francis   MRN:     [id number] Gender:   female   Age:    63 year old :      1955        Preoperative Diagnosis: Left Ovary Cyst    Procedure(s):  Laparoscopic Bilateral Salpingo-Oophorectomy  Possible Open Laparotomy, Possible Cancer Staging     Past Medical History:   Diagnosis Date     Allergic rhinitis     molds, dust     Atrophic vaginitis      Cervical cancer (H) 4/10/2000    chemo and radiation, IB-II squamous cell, positive nodes     Diverticulosis of colon 2004     Hypertension goal BP (blood pressure) < 140/90      Liver mass      Mild persistent asthma      Mild recurrent major depression (H)      Nasal polyps      Ovarian mass, left      Overactive bladder      Sensorineural hearing losses      Tinnitus      Varicose vein of leg     No surgery      Past Surgical History:   Procedure Laterality Date     COLONOSCOPY WITH CO2 INSUFFLATION N/A 2017    Procedure: COLONOSCOPY WITH CO2 INSUFFLATION;  Surgeon: Anatoly Do MD;  Location: [facility] OR     COLPOSCOPY,LOOP ELECTRD CERVIX EXCIS      Radiation & chemotherapy for cervical cancer. Diagnosed in      HC CORRECT BUNION,SIMPLE  over 10 years ago    ESTEPHANIE     SINUS SURGERY      x 2, Nasal polyps removed          Anesthesia Evaluation    ROS/Med Hx    No history of anesthetic complications  (-) malignant hyperthermia and tuberculosis  Comments: Met with Wendy and her family members. Wendy has been NPO and is scheduled for: Procedure(s):  Laparoscopic Bilateral Salpingo-Oophorectomy  Possible Open Laparotomy, Possible Cancer Staging    Past Medical History:  H/O: Allergic rhinitis      Comment: molds, dust  H/O: Atrophic vaginitis  4/10/2000: Cervical cancer (H)      Comment: chemo and radiation, IB-II squamous cell,                positive nodes  2004: Diverticulosis of colon  H/O: Hypertension goal BP (blood pressure) < 140/90  H/O: Liver mass  H/O: Mild persistent  asthma  H/O Mild recurrent major depression (H)  H/O: Nasal polyps  H/O: Ovarian mass, left  H/O: Overactive bladder  H/O: Sensorineural hearing losses  H/O: Tinnitus  H/O Varicose vein of leg      Comment: No surgery    Past Surgical History:  4/12/2017: COLONOSCOPY WITH CO2 INSUFFLATION N/A      Comment: Procedure: COLONOSCOPY WITH CO2 INSUFFLATION;                Surgeon: Anatoly Do MD;  Location:               [facility] OR  2000: COLPOSCOPY,LOOP ELECTRD CERVIX EXCIS      Comment: Radiation & chemotherapy for cervical cancer.                Diagnosed in 2000  over 10 years ago: HC CORRECT BUNION,SIMPLE      Comment: ESTEPHANIE  H/O SINUS SURGERY      Comment: x 2, Nasal polyps removed    She is not a smoker    Cardiovascular Findings   (+) hypertension,   Comments: EKG : 11/21/18  Within normal limits    Neuro Findings - negative ROS    Pulmonary Findings   (+) asthma  (-) apnea    Asthma  Control: well controlled  Daily inhaler: effective    HENT Findings - negative HENT ROS    Skin Findings - negative skin ROS      GI/Hepatic/Renal Findings   (-) GERD    Endocrine/Metabolic Findings - negative ROS        Hematology/Oncology Findings   (+) cancer    Additional Notes  Allergies:   -- Citalopram     --  Thinking changes   -- Flonase (Fluticasone Propionate)    -- Sulfa Drugs -- Rash   -- Sulfasalazine -- Rash      Current Facility-Administered Medications:      bupivacaine liposome (EXPAREL) 1.3 % LA inj susp 20 mL, 20 mL, Infiltration, DURING SURGERY, Keshav Wilson MD     ceFAZolin (ANCEF) 1 g vial to attach to  ml bag for ADULT or 50 ml bag for PEDS, 1 g, Intravenous, See Admin Instructions, Genesis Hicks MD     ceFAZolin (ANCEF) intermittent infusion 2 g in 100 mL dextrose PRE-MIX, 2 g, Intravenous, Pre-Op/Pre-procedure x 1 dose, Genesis Hicks MD     fentaNYL (PF) (SUBLIMAZE) injection 25-50 mcg, 25-50 mcg, Intravenous, Q2 Min PRN, Keshav Wilson MD     flumazenil (ROMAZICON) injection 0.2 mg, 0.2 mg,  Intravenous, q1 min prn, Keshav Wilson MD     heparin sodium injection 5,000 Units, 5,000 Units, Subcutaneous, Pre-Op/Pre-procedure x 1 dose, Genesis Hicks MD     ipratropium - albuterol 0.5 mg/2.5 mg/3 mL (DUONEB) neb solution 3 mL, 3 mL, Nebulization, Once, Loki Triplett MD     lactated ringers infusion, , Intravenous, Continuous, Shabbir Palmer MD     lidocaine (LMX4) cream, , Topical, Q1H PRN, Shabbir Palmer MD     lidocaine 1 % 1 mL, 1 mL, Other, Q1H PRN, Shabbir Palmer MD     midazolam (VERSED) injection 1-2 mg, 1-2 mg, Intravenous, Q4 Min PRN, Keshav Wilson MD     naloxone (NARCAN) injection 0.1-0.4 mg, 0.1-0.4 mg, Intravenous, Q2 Min PRN, Keshav Wilson MD     sodium chloride (PF) 0.9% PF flush 3 mL, 3 mL, Intracatheter, Q1H PRN, Shabbir Palmer MD     sodium chloride (PF) 0.9% PF flush 3 mL, 3 mL, Intracatheter, Q8H, Shabbir Palmer MD    Prescriptions Prior to Admission:  albuterol (PROAIR HFA/PROVENTIL HFA/VENTOLIN HFA) 108 (90 BASE) MCG/ACT Inhaler, Inhale 2 puffs into the lungs every 4 hours as needed for shortness of breath / dyspnea, Disp: 1 Inhaler, Rfl: 4, Past Month at Unknown time  budesonide-formoterol (SYMBICORT) 80-4.5 MCG/ACT Inhaler, Inhale 2 puffs into the lungs 2 times daily, Disp: 3 Inhaler, Rfl: 3, 11/30/2018 at 0400  cetirizine (ZYRTEC) 10 MG tablet, Take 1 tablet by mouth daily as needed for allergies., Disp: , Rfl: , 11/29/2018 at Unknown time  MULTI-VITAMIN OR, DAILY, Disp: , Rfl: , 11/29/2018 at Unknown time  Omega-3 Fatty Acids (FISH OIL) 500 MG CAPS, Take  by mouth. 1 daily  , Disp: , Rfl: , 11/29/2018 at Unknown time  tolterodine (DETROL LA) 4 MG 24 hr capsule, Take 1 capsule (4 mg) by mouth daily, Disp: 90 capsule, Rfl: 3, 11/30/2018 at 0400  Triamcinolone Acetonide (NASACORT ALLERGY 24HR NA), , Disp: , Rfl: , 11/30/2018 at 0400  ipratropium - albuterol 0.5 mg/2.5 mg/3 mL (DUONEB) 0.5-2.5 (3) MG/3ML neb solution, Take 1 vial (3 mLs) by nebulization every 6 hours as needed for shortness of  "breath / dyspnea or wheezing, Disp: 30 vial, Rfl: 0, More than a month at Unknown time  triamcinolone (KENALOG) 0.1 % cream, Apply to affected area sparingly twice daily as needed, Disp: 30 g, Rfl: 1, Taking            PHYSICAL EXAM:   Mental Status/Neuro: A/A/O   Airway: Facies: Feasible  Mallampati: I  Mouth/Opening: Full  TM distance: > 6 cm  Neck ROM: Full   Respiratory: Auscultation: CTAB     Resp. Rate: Normal     Resp. Effort: Normal      CV: Rhythm: Regular  Rate: Age appropriate  Heart: Normal Sounds   Comments:      Dental:  Dental Comments: stable                  Lab Results   Component Value Date    HGB 13.2 11/30/2018    POTASSIUM 3.4 11/30/2018    CR 0.89 11/30/2018    GLC 90 06/02/2015         Preop Vitals  BP Readings from Last 3 Encounters:   11/30/18 137/88   11/21/18 148/88   10/18/18 159/81    Pulse Readings from Last 3 Encounters:   11/30/18 84   11/21/18 84   10/18/18 83      Resp Readings from Last 3 Encounters:   11/30/18 14   11/21/18 12   09/06/18 18    SpO2 Readings from Last 3 Encounters:   11/30/18 94%   11/21/18 96%   10/18/18 96%      Temp Readings from Last 1 Encounters:   11/30/18 36.4  C (97.6  F) (Oral)    Ht Readings from Last 1 Encounters:   11/30/18 1.676 m (5' 6\")      Wt Readings from Last 1 Encounters:   11/30/18 63 kg (138 lb 14.2 oz)    Estimated body mass index is 22.42 kg/(m^2) as calculated from the following:    Height as of this encounter: 1.676 m (5' 6\").    Weight as of this encounter: 63 kg (138 lb 14.2 oz).     LDA:  Peripheral IV 11/30/18 Left Hand (Active)   Site Assessment WDL 11/30/2018  6:04 AM   Line Status Saline locked 11/30/2018  6:04 AM   Phlebitis Scale 0-->no symptoms 11/30/2018  6:04 AM   Infiltration Scale 0 11/30/2018  6:04 AM   Number of days:0          Assessment:   ASA SCORE: 2    NPO Status: > 2 hours since completed Clear Liquids; > 6 hours since completed Solid Foods   Documentation: H&P complete; Preop Testing complete; Consents complete "   Proceeding: Proceed without further delay  Tobacco Use:  NO Active use of Tobacco/UNKNOWN Tobacco use status     Plan:   Anes. Type:  General   Pre-Induction: Midazolam IV   Induction:  IV (Standard); Propofol   Airway: Oral ETT   Access/Monitoring: PIV   Maintenance: Balanced   Emergence: Procedure Site   Logistics: Same Day Surgery     Postop Pain/Sedation Strategy:  Standard-Options: Opioids PRN     PONV Management:  Adult Risk Factors: Female, Non-Smoker, Postop Opioids  Prevention: Ondansetron; Dexamethasone     CONSENT: Direct conversation   Plan and risks discussed with: Patient   Blood Products: Consented (ALL Blood Products) (verbal consent obtained)     Comments for Plan/Consent:  Wendy STEWART. Procedures and risks explained. She understood and consented. Qs answered.                Shabbir Palmer MD

## 2018-11-30 NOTE — BRIEF OP NOTE
Brief Operative Note   Name: Wendy Francis  MRN: [id number]  : 1955  Date of Surgery: 2018    Pre-operative Diagnosis:   Left adnexal mass     Post-operative Diagnosis:   Same, frozen pathology possibly borderline    Procedure(s): laparoscopic bilateral salpingoophorectomy, cystoscopy    Surgeon: Genesis Hicks MD   Assistants: Parisa Manning MD PGY4    Anesthesia: GETA with preoperative TAPS block  EBL: 20 mL   Urine Output: 400 mL clear urine   Fluids: 1100  mL crystalloid    Specimens:   ID Type Source Tests Destination   1 : Abdominal Washings Washings Abdomen CYTOLOGY NON GYN    A :  Tissue Ovary, Right SURGICAL PATHOLOGY EXAM    B :  Tissue Ovary, Left SURGICAL PATHOLOGY EXAM      Complications: None apparent.  Findings:  Stenotic vagina. Mobile mass in LLQ. Moderate to dense adhesive disease at adnexa between the ovary and sidewall on the right and descending colon and the sidewall on the left. Tissue minimally pliable, tough; presumed radiation change. The cyst was free and not adherent to the pelvis. It was aspirated in a bag in the abdomen, thick straw-colored fluid. No contents were spilled in the abdomen. The bladder was intact with bilateral ureteral jets seen.     Parisa Manning  2018, 10:16 AM

## 2018-11-30 NOTE — ANESTHESIA POSTPROCEDURE EVALUATION
Anesthesia POST Procedure Evaluation    Patient: Wendy Francis   MRN:     [id number] Gender:   female   Age:    63 year old :      1955        Preoperative Diagnosis: Left Ovary Cyst    Procedure(s):  Laparoscopic Bilateral Salpingo-Oophorectomy, Pelvic washings, cystoscopy  CYSTOSCOPY   Postop Comments: No value filed.       Anesthesia Type:  General    Reportable Event: NO     PAIN: Uncomplicated   Sign Out status: Comfortable, Well controlled pain     PONV: No PONV   Sign Out status:  No Nausea or Vomiting     Neuro/Psych: Uneventful perioperative course   Sign Out Status: Preoperative baseline; Age appropriate mentation     Airway/Resp.: Uneventful perioperative course   Sign Out Status: Non labored breathing, age appropriate RR; Resp. Status within EXPECTED Parameters     CV: Uneventful perioperative course   Sign Out status: Appropriate BP and perfusion indices; Appropriate HR/Rhythm     Disposition:   Sign Out in:  PACU  Disposition:  Phase II; Home  Recovery Course: Uneventful  Follow-Up: Not required           Last Anesthesia Record Vitals:  CRNA VITALS  2018 0948 - 2018 1048      2018             Pulse: 92    SpO2: 100 %    Resp Rate (set): 10          Last PACU/Preop Vitals:  Vitals:    18 1030 18 1045 18 1100   BP: 132/76 133/82 143/82   Pulse:      Resp: 10 15 12   Temp:  36.4  C (97.5  F)    SpO2: 100% 100% 100%         Electronically Signed By: Stella Chan MD, 2018, 11:14 AM

## 2018-11-30 NOTE — IP AVS SNAPSHOT
MRN:[id number]                      After Visit Summary   11/30/2018    Wendy Francis    MRN: [id number]           Thank you!     Thank you for choosing [facility] for your care. Our goal is always to provide you with excellent care. Hearing back from our patients is one way we can continue to improve our services. Please take a few minutes to complete the written survey that you may receive in the mail after you visit with us. Thank you!        Patient Information     Date Of Birth          1955        About your hospital stay     You were admitted on:  November 30, 2018 You last received care in the:  Post Anesthesia Care Unit [facility]    You were discharged on:  November 30, 2018       Who to Call     For medical emergencies, please call 911.  For non-urgent questions about your medical care, please call your primary care provider or clinic, [phone]  For questions related to your surgery, please call your surgery clinic        Attending Provider     Provider Specialty    Genesis Hicks MD Oncology       Primary Care Provider Office Phone # Fax #    Rosanna Lenz [phone]      After Care Instructions     Discharge Instructions       GENERAL POST-OPERATIVE  PATIENT INSTRUCTIONS    ?    FOLLOW-UP:    Call Surgeon if you have:    Temperature greater than 100.4    Persistent nausea and vomiting    Severe uncontrolled pain    Redness, tenderness, or signs of infection (pain, swelling, redness, odor or green/yellow discharge around the site)    Difficulty breathing, headache or visual disturbances    Hives    Persistent dizziness or light-headedness    Extreme fatigue    Any other questions or concerns you may have after discharge    In an emergency, call 911 or go to an Emergency Department at a nearby hospital    WOUND CARE INSTRUCTIONS: Keep a dry clean dressing on the wound if there is drainage. The initial bandage may be removed after 24 hours. Once the  wound has quit draining you may leave it open to air. If clothing rubs against the wound or causes irritation and the wound is not draining you may cover it with a dry dressing during the daytime. Try to keep the wound dry and avoid ointments on the wound unless directed to do so. If the wound becomes bright red and painful or starts to drain infected material that is not clear, please contact your physician immediately.    1. You may shower 48 hrs after surgery    2. No soaking in the tub    DIET: There are no dietary restrictions. You may eat any foods that you can tolerate. It is a good idea to eat a high fiber diet and take in plenty of fluids to prevent constipation. If you do become constipated you may want to take a mild laxative or take ducolax tablets on a daily basis until your bowel habits are regular. Constipation can be very uncomfortable, along with straining, after recent surgery.    ACTIVITY: You are encouraged to cough and deep breath or use your incentive spirometer if you were given one, every 15-30 minutes when awake. This will help prevent respiratory complications and low grade fevers post-operatively if you had a general anesthetic. You may want to hug a pillow when coughing and sneezing to add additional support to the surgical area, if you had abdominal or chest surgery, which will decrease pain during these times.    1. No heavy lifting >20lbs or strenuous exercise for six-eight weeks. No exercise in which you are using core muscles (yoga, pilates, swimming, weight lifting)    2. You may walk as much as you wish. You are encouraged to increase your activity each day after surgery. Stairs are okay.      MEDICATIONS: Try to take narcotic medications and anti-inflammatory medications, such as tylenol, ibuprofen, naprosyn, etc., with food. This will minimize stomach upset from the medication. Should you develop nausea and vomiting from the pain medication, or develop a rash, please discontinue  the medication and contact your physician. You should not drive, make important decisions, or operate machinery when taking narcotic pain medication.    OTHER: Patients are often constipated after general anesthesia and surgery. The patient should continue to take stool softeners (for example, Senokot-S) for the next six weeks and consume adequate amounts of water. If the patient remains constipated or unable to pass stool, please try one or all of the following measures:    1. Milk of Magnesia 30cc twice a day as needed by mouth    2. Metamucil 2 tablespoons in 12 ounces of fluid    3. Dulcolax oral or suppositories    4. Prunes or prune juice    5. Miralax daily    ?    QUESTIONS: Please feel free to call your physician or the hospital  if you have any questions, and they will be glad to assist you.                  Your next 10 appointments already scheduled     Dec 10, 2018 10:15 AM CST   MR LIVER WO & W CONTRAST with BEMR1   [facility]    [address] [facility] [id number] [phone]           How do I prepare for my exam? (Food and drink instructions) Do not eat or drink for 6 hours prior to exam. **If you will be receiving sedation or general anesthesia, please see special notes below.**  How do I prepare for my exam? (Other instructions) Take your medicines as usual, unless your doctor tells you not to. You may or may not receive IV contrast for this exam pending the discretion of the Radiologist.  **If you will be receiving sedation or general anesthesia, please see special notes below.**  What should I wear: The MRI machine uses a strong magnet. Please wear clothes without metal (snaps, zippers). A sweatsuit works well, or we may give you a hospital gown. Please remove any body piercings and hair extensions before you arrive. You will also remove watches, jewelry, hairpins, wallets, dentures, partial dental plates and hearing aids. You may  wear contact lenses, and you may be able to wear your rings. We have a safe place to keep your personal items, but it is safer to leave them at home.  How long does the exam take: Most tests take 30 to 60 minutes.  HOWEVER, IF YOUR DOCTOR PRESCRIBES ANESTHESIA please plan on spending four to five hours in the recovery room.  What should I bring: Bring a list of your current medicines to your exam (including vitamins, minerals and over-the-counter drugs). Also bring the results of similar scans you may have had.  Do I need a : **If you will be receiving sedation or general anesthesia, please see special notes below.**  What should I do after the exam: No Restrictions, You may resume normal activities.  What is this test: MRI (magnetic resonance imaging) uses a strong magnet and radio waves to look inside the body. An MRA (magnetic resonance angiogram) does the same thing, but it lets us look at your blood vessels. A computer turns the radio waves into pictures showing cross sections of the body, much like slices of bread. This helps us see any problems more clearly. You may receive fluid (called  contrast ) before or during your scan. The fluid helps us see the pictures better. We give the fluid through an IV (small needle in your arm).  Who should I call with questions: If you have any questions, please contact your Imaging Department exam site. Directions, parking instructions, and other information is available on our website, [email or web address].            Dec 13, 2018  2:20 PM CST   (Arrive by 2:05 PM)   Post-Op with Genesis Hicks MD   [facility] ([facility])    [address] [place] [address]  [address] [place] [address] [phone]              Further instructions from your care team       [facility]  Same-Day Surgery   Adult Discharge Orders & Instructions     For 24 hours after surgery    1. Get plenty of rest.  A  responsible adult must stay with you for at least 24 hours after you leave the hospital.   2. Do not drive or use heavy equipment.  If you have weakness or tingling, don't drive or use heavy equipment until this feeling goes away.  3. Do not drink alcohol.  4. Avoid strenuous or risky activities.  Ask for help when climbing stairs.   5. You may feel lightheaded.  IF so, sit for a few minutes before standing.  Have someone help you get up.   6. If you have nausea (feel sick to your stomach): Drink only clear liquids such as apple juice, ginger ale, broth or 7-Up.  Rest may also help.  Be sure to drink enough fluids.  Move to a regular diet as you feel able.  7. You may have a slight fever. Call the doctor if your fever is over 100 F (37.7 C) (taken under the tongue) or lasts longer than 24 hours.  8. You may have a dry mouth, a sore throat, muscle aches or trouble sleeping.  These should go away after 24 hours.  9. Do not make important or legal decisions.   Call your doctor for any of the followin.  Signs of infection (fever, growing tenderness at the surgery site, a large amount of drainage or bleeding, severe pain, foul-smelling drainage, redness, swelling).    2. It has been over 8 to 10 hours since surgery and you are still not able to urinate (pass water).    3.  Headache for over 24 hours.    To contact a doctor, call Dr Hicks's office @ [phone] or:        [phone] and ask for the resident on call for   GYN/ONC (answered 24 hours a day)      Emergency Department:    [facility]: [phone]       (TTY for hearing impaired: [phone])              Additional Information     If you use hormonal birth control (such as the pill, patch, ring or implants): You'll need a second form of birth control for 7 days (condoms, a diaphragm or contraceptive foam). While in the hospital, you received a medicine called Bridion. Your normal birth control will not work as well for a week after taking  "this medicine.          Pending Results     Date and Time Order Name Status Description    11/30/2018 0931 Surgical pathology exam Preliminary     11/30/2018 0848 Cytology non gyn In process             Admission Information     Date & Time Provider Department Dept. Phone    11/30/2018 Genesis Hicks MD Post Anesthesia Care Unit [facility] [phone]      Your Vitals Were     Blood Pressure Pulse Temperature Respirations Height Weight    143/82 84 97.5  F (36.4  C) (Axillary) 12 1.676 m (5' 6\") 63 kg (138 lb 14.2 oz)    Pulse Oximetry BMI (Body Mass Index)                100% 22.42 kg/m2          MyChart Information     [facility] gives you secure access to your electronic health record. If you see a primary care provider, you can also send messages to your care team and make appointments. If you have questions, please call your primary care clinic.  If you do not have a primary care provider, please call [phone] and they will assist you.        Care EveryWhere ID     This is your Care EveryWhere ID. This could be used by other organizations to access your [facility] medical records  [id number]        Equal Access to Services     RICHARD FINNEY : Hadii breonna Neumann, elina mobley, shireen carlin, rajwinder joy. So [facility] [phone].    ATENCIÓN: Si habla español, tiene a wolff disposición servicios gratuitos de asistencia lingüística. Llame al [phone].    We comply with applicable federal civil rights laws and [place] laws. We do not discriminate on the basis of race, color, national origin, age, disability, sex, sexual orientation, or gender identity.               Review of your medicines      START taking        Dose / Directions    oxyCODONE-acetaminophen 5-325 MG tablet   Commonly known as:  PERCOCET   Used for:  S/P BSO (bilateral salpingo-oophorectomy)        Dose:  1-2 tablet   Take 1-2 tablets by mouth every 4 hours as needed for moderate to " severe pain   Quantity:  15 tablet   Refills:  0       senna-docusate 8.6-50 MG tablet   Commonly known as:  SENOKOT-S/PERICOLACE   Used for:  S/P BSO (bilateral salpingo-oophorectomy)        Dose:  1-2 tablet   Take 1-2 tablets by mouth 2 times daily as needed for constipation (While on oral opioids.)   Quantity:  30 tablet   Refills:  0         CONTINUE these medicines which have NOT CHANGED        Dose / Directions    albuterol 108 (90 Base) MCG/ACT inhaler   Commonly known as:  PROAIR HFA/PROVENTIL HFA/VENTOLIN HFA   Used for:  Mild persistent asthma without complication        Dose:  2 puff   Inhale 2 puffs into the lungs every 4 hours as needed for shortness of breath / dyspnea   Quantity:  1 Inhaler   Refills:  4       budesonide-formoterol 80-4.5 MCG/ACT Inhaler   Commonly known as:  SYMBICORT   Used for:  Mild persistent asthma without complication        Dose:  2 puff   Inhale 2 puffs into the lungs 2 times daily   Quantity:  3 Inhaler   Refills:  3       cetirizine 10 MG tablet   Commonly known as:  zyrTEC        Dose:  10 mg   Take 1 tablet by mouth daily as needed for allergies.   Refills:  0       Fish Oil 500 MG Caps        Take  by mouth. 1 daily   Refills:  0       ipratropium - albuterol 0.5 mg/2.5 mg/3 mL 0.5-2.5 (3) MG/3ML neb solution   Commonly known as:  DUONEB   Used for:  Mild intermittent asthma with exacerbation        Dose:  1 vial   Take 1 vial (3 mLs) by nebulization every 6 hours as needed for shortness of breath / dyspnea or wheezing   Quantity:  30 vial   Refills:  0       MULTI-VITAMIN PO        DAILY   Refills:  0       NASACORT ALLERGY 24HR NA        Refills:  0       tolterodine ER 4 MG 24 hr capsule   Commonly known as:  DETROL LA   Used for:  Overactive bladder        Dose:  4 mg   Take 1 capsule (4 mg) by mouth daily   Quantity:  90 capsule   Refills:  3       triamcinolone 0.1 % external cream   Commonly known as:  KENALOG   Used for:  Dermatitis        Apply to affected area  "sparingly twice daily as needed   Quantity:  30 g   Refills:  1            Where to get your medicines      These medications were sent to [facility] [place], [place] - [address]  [address] [address]     Phone:  [phone]     senna-docusate 8.6-50 MG tablet         Some of these will need a paper prescription and others can be bought over the counter. Ask your nurse if you have questions.     Bring a paper prescription for each of these medications     oxyCODONE-acetaminophen 5-325 MG tablet                Protect others around you: Learn how to safely use, store and throw away your medicines at www.disposemymeds.org.        Information about your nerve block     Today you received a block to numb the nerves near your surgery site.    This is a block using local anesthetic or \"numbing\" medication injected around the nerves to anesthetize or \"numb\" the area supplied by those nerves. This block is injected into the muscle layer near your surgical site. The type of anesthesia (Exparel) your anesthesia team used to numb your abdomen may give you relief for up to 72 hours.     Diet: There are no diet restrictions, but you should drink plenty of fluids, unless you are on a fluid-restricted diet.     Activity: If your surgical site is an arm or leg you should be careful with your affected limb, since it is possible to injure your limb without being aware of it due to the numbing. Until full feeling returns, you should guard against bumping or hitting your limb, and avoid extreme hot or cold temperatures on the skin.    Pain Medication: As the block wears off, the feeling will return as a tingling or prickly sensation near your surgical site. You will experience more discomfort from your incisions as the feeling returns. You may want to take a pain pill (a narcotic or Tylenol if this was prescribed by your surgeon) when you start to experience mild pain, before the pain " becomes more severe. If your pain medications do not control your pain, you should notify your surgeon. If you are taking narcotics for pain management, do not drink alcohol, drive a car, or perform hazardous activities.  If you have questions or concerns you may call your surgeon at the number provided with your discharge instructions.     Call your surgeon if you experience blurry vision, ringing in the ears or metallic taste in your mouth.         Information about OPIOIDS     PRESCRIPTION OPIOIDS: WHAT YOU NEED TO KNOW   We gave you an opioid (narcotic) pain medicine. It is important to manage your pain, but opioids are not always the best choice. You should first try all the other options your care team gave you. Take this medicine for as short a time (and as few doses) as possible.    Some activities can increase your pain, such as bandage changes or therapy sessions. It may help to take your pain medicine 30 to 60 minutes before these activities. Reduce your stress by getting enough sleep, working on hobbies you enjoy and practicing relaxation or meditation. Talk to your care team about ways to manage your pain beyond prescription opioids.    These medicines have risks:    DO NOT drive when on new or higher doses of pain medicine. These medicines can affect your alertness and reaction times, and you could be arrested for driving under the influence (DUI). If you need to use opioids long-term, talk to your care team about driving.    DO NOT operate heavy machinery    DO NOT do any other dangerous activities while taking these medicines.    DO NOT drink any alcohol while taking these medicines.     If the opioid prescribed includes acetaminophen, DO NOT take with any other medicines that contain acetaminophen. Read all labels carefully. Look for the word  acetaminophen  or  Tylenol.  Ask your pharmacist if you have questions or are unsure.    You can get addicted to pain medicines, especially if you have a  history of addiction (chemical, alcohol or substance dependence). Talk to your care team about ways to reduce this risk.    All opioids tend to cause constipation. Drink plenty of water and eat foods that have a lot of fiber, such as fruits, vegetables, prune juice, apple juice and high-fiber cereal. Take a laxative (Miralax, milk of magnesia, Colace, Senna) if you don t move your bowels at least every other day. Other side effects include upset stomach, sleepiness, dizziness, throwing up, tolerance (needing more of the medicine to have the same effect), physical dependence and slowed breathing.    Store your pills in a secure place, locked if possible. We will not replace any lost or stolen medicine. If you don t finish your medicine, please throw away (dispose) as directed by your pharmacist. The [place] Pollution Control Agency has more information about safe disposal: https://www.pca.[facility].us/living-green/managing-unwanted-medications             Medication List: This is a list of all your medications and when to take them. Check marks below indicate your daily home schedule. Keep this list as a reference.      Medications           Morning Afternoon Evening Bedtime As Needed    albuterol 108 (90 Base) MCG/ACT inhaler   Commonly known as:  PROAIR HFA/PROVENTIL HFA/VENTOLIN HFA   Inhale 2 puffs into the lungs every 4 hours as needed for shortness of breath / dyspnea                                budesonide-formoterol 80-4.5 MCG/ACT Inhaler   Commonly known as:  SYMBICORT   Inhale 2 puffs into the lungs 2 times daily                                cetirizine 10 MG tablet   Commonly known as:  zyrTEC   Take 1 tablet by mouth daily as needed for allergies.                                Fish Oil 500 MG Caps   Take  by mouth. 1 daily                                ipratropium - albuterol 0.5 mg/2.5 mg/3 mL 0.5-2.5 (3) MG/3ML neb solution   Commonly known as:  DUONEB   Take 1 vial (3 mLs) by nebulization every 6  hours as needed for shortness of breath / dyspnea or wheezing   Last time this was given:  3 mLs on 11/30/2018  7:23 AM                                MULTI-VITAMIN PO   DAILY                                NASACORT ALLERGY 24HR NA                                oxyCODONE-acetaminophen 5-325 MG tablet   Commonly known as:  PERCOCET   Take 1-2 tablets by mouth every 4 hours as needed for moderate to severe pain                                senna-docusate 8.6-50 MG tablet   Commonly known as:  SENOKOT-S/PERICOLACE   Take 1-2 tablets by mouth 2 times daily as needed for constipation (While on oral opioids.)                                tolterodine ER 4 MG 24 hr capsule   Commonly known as:  DETROL LA   Take 1 capsule (4 mg) by mouth daily                                triamcinolone 0.1 % external cream   Commonly known as:  KENALOG   Apply to affected area sparingly twice daily as needed

## 2018-11-30 NOTE — IP AVS SNAPSHOT
Post Anesthesia Care Unit [address] [address]    Phone:  [phone]                                       After Visit Summary   11/30/2018    Wendy Francis    MRN: [id number]           After Visit Summary Signature Page     I have received my discharge instructions, and my questions have been answered. I have discussed any challenges I see with this plan with the nurse or doctor.    ..........................................................................................................................................  Patient/Patient Representative Signature      ..........................................................................................................................................  Patient Representative Print Name and Relationship to Patient    ..................................................               ................................................  Date                                   Time    ..........................................................................................................................................  Reviewed by Signature/Title    ...................................................              ..............................................  Date                                               Time          22EPIC Rev 08/18

## 2018-11-30 NOTE — PROGRESS NOTES
"Gynecology Oncology Postoperative Check    Ms. 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  Pain is controlled on PO medications. Has had light snacks without nausea. Yet to void but does not feel full bladder. Has ambulated without dizziness.     Objective:   Patient Vitals for the past 24 hrs:   BP Temp Temp src Pulse Heart Rate Resp SpO2 Height Weight   11/30/18 1154 150/75 97.5  F (36.4  C) Oral - 85 16 96 % - -   11/30/18 1145 145/87 - - - 79 16 96 % - -   11/30/18 1130 150/85 - - - 75 16 97 % - -   11/30/18 1120 (!) 145/91 97.5  F (36.4  C) Oral - 80 16 96 % - -   11/30/18 1115 (!) 159/100 - - - 83 16 98 % - -   11/30/18 1100 143/82 - - - 76 12 100 % - -   11/30/18 1045 133/82 97.5  F (36.4  C) Axillary - 76 15 100 % - -   11/30/18 1030 132/76 - - - 78 10 100 % - -   11/30/18 1020 132/76 97.3  F (36.3  C) Axillary - 90 12 100 % - -   11/30/18 0720 144/80 - - - 81 16 98 % - -   11/30/18 0715 141/90 - - - 88 16 97 % - -   11/30/18 0542 137/88 97.6  F (36.4  C) Oral 84 - 14 94 % 1.676 m (5' 6\") 63 kg (138 lb 14.2 oz)       General: NAD  CV: RRR  Resp: CTAB  Abdomen: soft, non tender, not distended  Incision: bandaged port sites x4 clean and dry  Extremities: nontender, no edema    Assessment: POD#0 laparoscopic BSO    Active Problem list:  Postoperative state  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 She feels well and is meeting all postoperative goals but has not yet voided. Once she voids, she can discharge to home.     Parisa Manning MD PGY4  OB/Gyn  11/30/2018, 12:20 PM          "

## 2018-11-30 NOTE — OR NURSING
Page to gyn/onc resident because consent in chart does not match order to consent in epic. Day resident will be coming to 3C as soon as possible to change/update consent.

## 2018-11-30 NOTE — ANESTHESIA CARE TRANSFER NOTE
Patient: Wendy Francis    Procedure(s):  Laparoscopic Bilateral Salpingo-Oophorectomy, Pelvic washings, cystoscopy  CYSTOSCOPY    Diagnosis: Left Ovary Cyst   Diagnosis Additional Information: No value filed.    Anesthesia Type:   No value filed.     Note:  Airway :Face Mask  Patient transferred to:PACU  Comments: Patient awake and making needs known, report to RN upon arrival to PARHandoff Report: Identifed the Patient, Identified the Reponsible Provider, Reviewed the pertinent medical history, Discussed the surgical course, Reviewed Intra-OP anesthesia mangement and issues during anesthesia, Set expectations for post-procedure period and Allowed opportunity for questions and acknowledgement of understanding      Vitals: (Last set prior to Anesthesia Care Transfer)    CRNA VITALS  11/30/2018 0948 - 11/30/2018 1023      11/30/2018             Pulse: 92    SpO2: 100 %    Resp Rate (set): 10                Electronically Signed By: Mitra Marcial CRNA, APRN CRNA  November 30, 2018  10:23 AM

## 2018-11-30 NOTE — ANESTHESIA PROCEDURE NOTES
Peripheral Nerve Block Procedure Note    Staff:     Anesthesiologist:  JESSICA NOGUERA    Referred By:  GENOVEVA BROWN  Location: Pre-op  Procedure Start/Stop TImes:      11/30/2018 7:10 AM     11/30/2018 7:16 AM    patient identified, IV checked, site marked, risks and benefits discussed, informed consent, monitors and equipment checked, pre-op evaluation, at physician/surgeon's request and post-op pain management      Correct Patient: Yes      Correct Position: Yes      Correct Site: Yes      Correct Procedure: Yes      Correct Laterality:  Yes    Site Marked:  Yes  Procedure details:     Procedure:  TAP    ASA:  2    Laterality:  Bilateral    Position:  Supine    Sterile Prep: chloraprep, mask and sterile gloves      Local skin infiltration:  None    Needle:  Short bevel    Needle gauge:  21    Needle length (mm):  110    Ultrasound: Yes      Ultrasound used to identify targeted nerve, plexus, or vascular structure and placed a needle adjacent to it      Permanent Image entered into patiient's record      Abnormal pain on injection: No      Blood Aspirated: No      Paresthesias:  No    Bleeding at site: No      Bolus via:  Needle    Infusion Method:  Single Shot    Complications:  None  Assessment/Narrative:     Injection made incrementally with aspirations every (mL):  5

## 2018-11-30 NOTE — OP NOTE
Operative Note   Name: Wendy Francis  MRN: [id number]  : 1955  Date of Surgery: 2018     Pre-operative Diagnosis:   Left adnexal mass      Post-operative Diagnosis:   Same, frozen pathology possibly borderline     Procedure(s): laparoscopic bilateral salpingoophorectomy, cystoscopy     Surgeon: Genesis Hicks MD   Assistants: Parisa Manning MD PGY4     Anesthesia: GETA with preoperative TAPS block  EBL: 20 mL   Urine Output: 400 mL clear urine   Fluids: 1100  mL crystalloid     Specimens:   ID Type Source Tests Destination   1 : Abdominal Washings Washings Abdomen CYTOLOGY NON GYN     A :  Tissue Ovary, Right SURGICAL PATHOLOGY EXAM     B :  Tissue Ovary, Left SURGICAL PATHOLOGY EXAM        Complications: None apparent.  Findings:  Stenotic vagina. Mobile mass in LLQ. Moderate to dense adhesive disease at adnexa between the ovary and sidewall on the right and descending colon and the sidewall on the left. Tissue minimally pliable, tough; presumed radiation change. The cyst was free and not adherent to the pelvis. It was aspirated in a bag in the abdomen, thick straw-colored fluid. No contents were spilled in the abdomen. The bladder was intact with bilateral ureteral jets seen.     Procedure  Ms Francis was taken to the OR, GETA was administered without issue. She was prepped and draped in usual sterile fashion in dorsal lithotomy position. Barraza catheter was placed in her bladder. Her abdomen was entered with Veress needle 2 cm above her umbilicus. Intraabdominal entry was suggested by drop test and appropriate opening pressure. A 5 mm port was placed at this site and confirmed intraabdominal placement as well as no injury to underlying organs. Survey was performed. A 12 mm port was placed in the RLQ and 5 mm port placed in the LLQ under direct visualization. The patient was placed in Trendelenberg and bowel swept from the pelvis. Both adnexae were easily visible. A fourth 5 mm port was placed superior  to the RLQ port for improved access. The ureters were visualized bilaterally. Using the monopolar fadi, adhesiolysis was performed to release the sigmoid and descending colon from the left pelvic sidewall, allowing better access to the cyst. Adhesiolysis was also performed with monopolar cautery in the right lower quadrant between the ovary and tube and the sidewall. Following this the right ureter was easily visualized, so the right IP was clamped, cauterized and cut using the Ligasure device. The Ligasure device was used to carry the defect to the cornua, which was amputated. Next, the right adnexa was elevated so that the IP ligament could be seen. The ureter was identified transperitoneally. The IP ligament was grasped with the Ligasure device, cauterized and transected. This defect was carried to the cornua which was amputated. The site was hemostatic. The left ovary and tube were placed in an Endocatch bag and removed from the pelvis. The right ovary and cyst were placed in a bag. The cyst was punctured with a syringe and its contents aspirated without intraabdominal spillage. The cyst wall could then be removed through the 12 mm port with ease. The surgical sites were examined and found to be hemostatic. The pelvis was irrigated and suctioned. The 12 mm port fascia was closed with 0-Vicryl with the Bryan Escobar device. Skin over all port sites was closed with 4-0 Monocryl and then covered with dermabond. Cystoscopy was performed; bladder dome was intact and bilateral ureteral jets were seen. The Barraza catheter was not replaced.     Dr. Hicks was present for the procedure.     Parisa Manning MD PGY4  OB/Gyn  11/30/2018, 8:26 PM      As the primary surgeon, I was scrubbed and present for the full course of the procedure.    Genesis Hicks M.D.

## 2018-12-03 LAB — COPATH REPORT: NORMAL

## 2018-12-07 ASSESSMENT — ENCOUNTER SYMPTOMS
BOWEL INCONTINENCE: 1
ABDOMINAL PAIN: 0
BLOOD IN STOOL: 0
VOMITING: 0
DIARRHEA: 1
BLOATING: 1
CONSTIPATION: 1
HEARTBURN: 0
NAUSEA: 0
RECTAL PAIN: 0

## 2018-12-10 ENCOUNTER — ANCILLARY PROCEDURE (OUTPATIENT)
Dept: MRI IMAGING | Facility: CLINIC | Age: 63
End: 2018-12-10
Attending: FAMILY MEDICINE
Payer: COMMERCIAL

## 2018-12-10 DIAGNOSIS — R16.0 LIVER MASS: ICD-10-CM

## 2018-12-10 LAB — COPATH REPORT: NORMAL

## 2018-12-10 PROCEDURE — 74183 MRI ABD W/O CNTR FLWD CNTR: CPT | Mod: TC

## 2018-12-10 PROCEDURE — A9585 GADOBUTROL INJECTION: HCPCS

## 2018-12-10 RX ORDER — GADOBUTROL 604.72 MG/ML
7.5 INJECTION INTRAVENOUS ONCE
Status: COMPLETED | OUTPATIENT
Start: 2018-12-10 | End: 2018-12-10

## 2018-12-10 RX ADMIN — GADOBUTROL 6 ML: 604.72 INJECTION INTRAVENOUS at 10:40

## 2018-12-10 NOTE — RESULT ENCOUNTER NOTE
Ele,    This is a good result -- the liver lesions are likely benign hemangiomas (blood filled lesions). A follow-up scan in 1 year is recommended to confirm. You also have benign kidney cysts.    Rosanna Lenz MD

## 2018-12-13 ENCOUNTER — OFFICE VISIT (OUTPATIENT)
Dept: ONCOLOGY | Facility: CLINIC | Age: 63
End: 2018-12-13
Attending: OBSTETRICS & GYNECOLOGY
Payer: COMMERCIAL

## 2018-12-13 VITALS
WEIGHT: 137.5 LBS | BODY MASS INDEX: 22.1 KG/M2 | DIASTOLIC BLOOD PRESSURE: 74 MMHG | HEART RATE: 85 BPM | SYSTOLIC BLOOD PRESSURE: 141 MMHG | RESPIRATION RATE: 14 BRPM | OXYGEN SATURATION: 97 % | HEIGHT: 66 IN | TEMPERATURE: 98.1 F

## 2018-12-13 DIAGNOSIS — C56.2: Primary | ICD-10-CM

## 2018-12-13 PROCEDURE — 99024 POSTOP FOLLOW-UP VISIT: CPT | Mod: ZP | Performed by: OBSTETRICS & GYNECOLOGY

## 2018-12-13 PROCEDURE — G0463 HOSPITAL OUTPT CLINIC VISIT: HCPCS | Mod: ZF

## 2018-12-13 ASSESSMENT — PAIN SCALES - GENERAL: PAINLEVEL: NO PAIN (0)

## 2018-12-13 ASSESSMENT — MIFFLIN-ST. JEOR: SCORE: 1200.2

## 2018-12-13 NOTE — LETTER
2018       RE: Wendy Francis  [address] [place] [address]  [place] [address]     Dear Colleague,    Thank you for referring your patient, Wendy Francis, to the [facility]. Please see a copy of my visit note below.    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     Since surgery she reports some constipation that has resolved, denies any nausea or vomiting. No abdominal pain, mild incisional pain on right is resolved.    Her past gynecologic history is notable for cervical cancer diagnosed in  for which she underwent chemoradiation therapy and has been without recurrence since her initial diagnosis. She underwent a laparotomy for lymph node dissection, which were positive.    She began to experience some lower extremity edema although does not clearly stated as lymphedema and was utilizing compression stockings most recently she noted that there was increasing swelling in the right lower extremity.  As per the course of evaluation lower externally ultrasound was performed which ruled out deep venous thrombosis and pelvic CT noted a 6.3 cm complex left ovarian cyst.  CT scan was performed on August 15,  follow-up pelvic ultrasound performed on  noted.  Uterus at 4.3 cm with endometrial thickness of 0.2 cm with a left complex ovarian cyst with thin septation measuring 4 x 3.7 x 4.3 cm with no associated ascites.       2018  16    10/12/2018 Pelvic US  FINDINGS: The uterus is anteflexed, measuring 4.0 x 1.7 x 3.7 cm in  long axis, short axis and transverse dimensions, respectively. No  myometrial  masses. The endometrial stripe measures 0.2 cm in  dual-layer thickness. A trace amount of fluid is present in the  endometrial cavity, within normal physiologic limits. The right ovary  is not visualized. The left ovary measures 6.2 x 5.5 x 4.6 cm. A 5.3 x  5.1 x 4.0 cm anechoic cyst containing a 1.5 cm internal echogenicity  is present in the left ovary. No adnexal masses. No free fluid in the  pelvis.    11/30/2018  Laparoscopic bilateral salpingoophorectomy, cystoscopy    Pathology     FINAL DIAGNOSIS:   A: Right ovary and fallopian tube, laparoscopic salpingo-oophorectomy:   - Ovarian serous cystadenofibroma (size 2.3 cm) and surface adhesions   - Fallopian tube with atrophic epithelium and mild hydrosalpinx,   suggestive of radiation related changes   - Fallopian tube surface adhesions   - Uninvolved by borderline tumor   - Negative for malignancy     B: Left ovary and fallopian tube, laparoscopic salpingo-oophorectomy   (including B1FS):   - Ovarian serous borderline tumor (size 6.5 cm)   - Ovarian surface uninvolved by borderline tumor   - - Fallopian tube with atrophic epithelium and mild hydrosalpinx,   suggestive of radiation related changes   - Fallopian tube surface adhesions   - Fallopian tube uninvolved by borderline tumor   - Negative for malignancy   - See comment         OBGYN history and Health Maintenance:  G 0  Last Pap Smear: 2015 negative HPV megative  Last Mammogram:2016, negataive  Last Colonoscopy: Never, Barium Enema negative 2018    Review of Systems:  Answers for HPI/ROS submitted by the patient on 12/7/2018   General Symptoms: No  Skin Symptoms: No  HENT Symptoms: No  EYE SYMPTOMS: No  HEART SYMPTOMS: No  LUNG SYMPTOMS: No  INTESTINAL SYMPTOMS: Yes  URINARY SYMPTOMS: No  GYNECOLOGIC SYMPTOMS: No  BREAST SYMPTOMS: No  SKELETAL SYMPTOMS: No  BLOOD SYMPTOMS: No  NERVOUS SYSTEM SYMPTOMS: No  MENTAL HEALTH SYMPTOMS: No  Heart burn or indigestion: No  Nausea: No  Vomiting: No  Abdominal pain:  No  Bloating: Yes  Constipation: Yes  Diarrhea: Yes  Blood in stool: No  Black stools: No  Rectal or Anal pain: No  Fecal incontinence: Yes  Vomit with blood: No  Change in stools: No          Past Medical History:   Diagnosis Date     Allergic rhinitis     molds, dust     Atrophic vaginitis      Cervical cancer (H) 4/10/2000    chemo and radiation, IB-II squamous cell, positive nodes     Diverticulosis of colon 6/2004     Hypertension goal BP (blood pressure) < 140/90      Liver mass      Mild persistent asthma      Mild recurrent major depression (H)      Nasal polyps      Ovarian mass, left      Overactive bladder      Sensorineural hearing losses      Tinnitus      Varicose vein of leg     No surgery       Past Surgical History:   Procedure Laterality Date     COLONOSCOPY WITH CO2 INSUFFLATION N/A 4/12/2017    Procedure: COLONOSCOPY WITH CO2 INSUFFLATION;  Surgeon: Anatoly Do MD;  Location:  OR     COLPOSCOPY,LOOP ELECTRD CERVIX EXCIS  2000    Radiation & chemotherapy for cervical cancer. Diagnosed in 2000     CYSTOSCOPY N/A 11/30/2018    Procedure: CYSTOSCOPY;  Surgeon: Genesis Hicks MD;  Location: [facility] OR      CORRECT BUNION,SIMPLE  over 10 years ago    ESTEPHANIE     LAPAROSCOPIC SALPINGO-OOPHORECTOMY Bilateral 11/30/2018    Procedure: Laparoscopic Bilateral Salpingo-Oophorectomy, Pelvic washings, cystoscopy;  Surgeon: Genesis Hicks MD;  Location: [facility] OR     SINUS SURGERY      x 2, Nasal polyps removed          Current Outpatient Medications   Medication Sig Dispense Refill     albuterol (PROAIR HFA/PROVENTIL HFA/VENTOLIN HFA) 108 (90 BASE) MCG/ACT Inhaler Inhale 2 puffs into the lungs every 4 hours as needed for shortness of breath / dyspnea 1 Inhaler 4     budesonide-formoterol (SYMBICORT) 80-4.5 MCG/ACT Inhaler Inhale 2 puffs into the lungs 2 times daily 3 Inhaler 3     cetirizine (ZYRTEC) 10 MG tablet Take 1 tablet by mouth daily as needed for allergies.       ipratropium - albuterol 0.5  mg/2.5 mg/3 mL (DUONEB) 0.5-2.5 (3) MG/3ML neb solution Take 1 vial (3 mLs) by nebulization every 6 hours as needed for shortness of breath / dyspnea or wheezing 30 vial 0     MULTI-VITAMIN OR DAILY       Omega-3 Fatty Acids (FISH OIL) 500 MG CAPS Take  by mouth. 1 daily         tolterodine (DETROL LA) 4 MG 24 hr capsule Take 1 capsule (4 mg) by mouth daily 90 capsule 3     triamcinolone (KENALOG) 0.1 % cream Apply to affected area sparingly twice daily as needed 30 g 1     Triamcinolone Acetonide (NASACORT ALLERGY 24HR NA)        ipratropium - albuterol 0.5 mg/2.5 mg/3 mL (DUONEB) 0.5-2.5 (3) MG/3ML neb solution Take 1 vial (3 mLs) by nebulization once for 1 dose 3 mL 0     oxyCODONE-acetaminophen (PERCOCET) 5-325 MG tablet Take 1-2 tablets by mouth every 4 hours as needed for moderate to severe pain (Patient not taking: Reported on 12/13/2018) 15 tablet 0     senna-docusate (SENOKOT-S/PERICOLACE) 8.6-50 MG tablet Take 1-2 tablets by mouth 2 times daily as needed for constipation (While on oral opioids.) (Patient not taking: Reported on 12/13/2018) 30 tablet 0         Allergies   Allergen Reactions     Citalopram      Thinking changes     Flonase [Fluticasone Propionate]      Sulfa Drugs Rash     Sulfasalazine Rash       Social History:  Social History     Tobacco Use     Smoking status: Never Smoker     Smokeless tobacco: Never Used   Substance Use Topics     Alcohol use: Yes     Alcohol/week: 1.0 oz     Comment: 1-2 a month       Family History:   The patient's family history is notable for cardiovascular disease.  Family History   Problem Relation Age of Onset     Cancer Maternal Grandmother         pelvic     Ovarian Cancer Maternal Grandmother      Neurologic Disorder Sister         brain aneurysm     Hypertension Father      Asthma Father      Pacemaker Father      Diabetes Maternal Grandfather      Allergies Brother      Cerebrovascular Disease Sister      C.A.D. No family hx of          Physical Exam:  "    /74 (BP Location: Right arm, Patient Position: Chair, Cuff Size: Adult Regular)   Pulse 85   Temp 98.1  F (36.7  C) (Oral)   Resp 14   Ht 1.676 m (5' 5.98\")   Wt 62.4 kg (137 lb 8 oz)   SpO2 97%   BMI 22.20 kg/m     Body mass index is 22.2 kg/m .    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    I reviewed pathology in detail with patient and based on her low risk status, we recommend annual evaluation to include Ca-125 and clinical exam.    Recovering from surgery without complications.      Genesis Hicks M.D., MPH,  F.A.C.O.G.    Division of Gynecologic Oncology              "

## 2018-12-13 NOTE — PROGRESS NOTES
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     Since surgery she reports some constipation that has resolved, denies any nausea or vomiting. No abdominal pain, mild incisional pain on right is resolved.    Her past gynecologic history is notable for cervical cancer diagnosed in  for which she underwent chemoradiation therapy and has been without recurrence since her initial diagnosis. She underwent a laparotomy for lymph node dissection, which were positive.    She began to experience some lower extremity edema although does not clearly stated as lymphedema and was utilizing compression stockings most recently she noted that there was increasing swelling in the right lower extremity.  As per the course of evaluation lower externally ultrasound was performed which ruled out deep venous thrombosis and pelvic CT noted a 6.3 cm complex left ovarian cyst.  CT scan was performed on August 15,  follow-up pelvic ultrasound performed on  noted.  Uterus at 4.3 cm with endometrial thickness of 0.2 cm with a left complex ovarian cyst with thin septation measuring 4 x 3.7 x 4.3 cm with no associated ascites.       2018  16    10/12/2018 Pelvic US  FINDINGS: The uterus is anteflexed, measuring 4.0 x 1.7 x 3.7 cm in  long axis, short axis and transverse dimensions, respectively. No  myometrial masses. The endometrial stripe measures 0.2 cm in  dual-layer thickness. A trace amount of fluid is present in the  endometrial cavity, within normal physiologic limits. The right ovary  is not visualized. The left ovary measures 6.2 x 5.5 x 4.6 cm. A  5.3 x  5.1 x 4.0 cm anechoic cyst containing a 1.5 cm internal echogenicity  is present in the left ovary. No adnexal masses. No free fluid in the  pelvis.    11/30/2018  Laparoscopic bilateral salpingoophorectomy, cystoscopy    Pathology     FINAL DIAGNOSIS:   A: Right ovary and fallopian tube, laparoscopic salpingo-oophorectomy:   - Ovarian serous cystadenofibroma (size 2.3 cm) and surface adhesions   - Fallopian tube with atrophic epithelium and mild hydrosalpinx,   suggestive of radiation related changes   - Fallopian tube surface adhesions   - Uninvolved by borderline tumor   - Negative for malignancy     B: Left ovary and fallopian tube, laparoscopic salpingo-oophorectomy   (including B1FS):   - Ovarian serous borderline tumor (size 6.5 cm)   - Ovarian surface uninvolved by borderline tumor   - - Fallopian tube with atrophic epithelium and mild hydrosalpinx,   suggestive of radiation related changes   - Fallopian tube surface adhesions   - Fallopian tube uninvolved by borderline tumor   - Negative for malignancy   - See comment         OBGYN history and Health Maintenance:  G 0  Last Pap Smear: 2015 negative HPV megative  Last Mammogram:2016, negataive  Last Colonoscopy: Never, Barium Enema negative 2018    Review of Systems:  Answers for HPI/ROS submitted by the patient on 12/7/2018   General Symptoms: No  Skin Symptoms: No  HENT Symptoms: No  EYE SYMPTOMS: No  HEART SYMPTOMS: No  LUNG SYMPTOMS: No  INTESTINAL SYMPTOMS: Yes  URINARY SYMPTOMS: No  GYNECOLOGIC SYMPTOMS: No  BREAST SYMPTOMS: No  SKELETAL SYMPTOMS: No  BLOOD SYMPTOMS: No  NERVOUS SYSTEM SYMPTOMS: No  MENTAL HEALTH SYMPTOMS: No  Heart burn or indigestion: No  Nausea: No  Vomiting: No  Abdominal pain: No  Bloating: Yes  Constipation: Yes  Diarrhea: Yes  Blood in stool: No  Black stools: No  Rectal or Anal pain: No  Fecal incontinence: Yes  Vomit with blood: No  Change in stools: No          Past Medical History:   Diagnosis Date     Allergic  rhinitis     molds, dust     Atrophic vaginitis      Cervical cancer (H) 4/10/2000    chemo and radiation, IB-II squamous cell, positive nodes     Diverticulosis of colon 6/2004     Hypertension goal BP (blood pressure) < 140/90      Liver mass      Mild persistent asthma      Mild recurrent major depression (H)      Nasal polyps      Ovarian mass, left      Overactive bladder      Sensorineural hearing losses      Tinnitus      Varicose vein of leg     No surgery       Past Surgical History:   Procedure Laterality Date     COLONOSCOPY WITH CO2 INSUFFLATION N/A 4/12/2017    Procedure: COLONOSCOPY WITH CO2 INSUFFLATION;  Surgeon: Anatoly Do MD;  Location:  OR     COLPOSCOPY,LOOP ELECTRD CERVIX EXCIS  2000    Radiation & chemotherapy for cervical cancer. Diagnosed in 2000     CYSTOSCOPY N/A 11/30/2018    Procedure: CYSTOSCOPY;  Surgeon: Genesis Hicks MD;  Location:  OR      CORRECT BUNION,SIMPLE  over 10 years ago    ESTEPHANIE     LAPAROSCOPIC SALPINGO-OOPHORECTOMY Bilateral 11/30/2018    Procedure: Laparoscopic Bilateral Salpingo-Oophorectomy, Pelvic washings, cystoscopy;  Surgeon: Genesis Hicks MD;  Location:  OR     SINUS SURGERY      x 2, Nasal polyps removed          Current Outpatient Medications   Medication Sig Dispense Refill     albuterol (PROAIR HFA/PROVENTIL HFA/VENTOLIN HFA) 108 (90 BASE) MCG/ACT Inhaler Inhale 2 puffs into the lungs every 4 hours as needed for shortness of breath / dyspnea 1 Inhaler 4     budesonide-formoterol (SYMBICORT) 80-4.5 MCG/ACT Inhaler Inhale 2 puffs into the lungs 2 times daily 3 Inhaler 3     cetirizine (ZYRTEC) 10 MG tablet Take 1 tablet by mouth daily as needed for allergies.       ipratropium - albuterol 0.5 mg/2.5 mg/3 mL (DUONEB) 0.5-2.5 (3) MG/3ML neb solution Take 1 vial (3 mLs) by nebulization every 6 hours as needed for shortness of breath / dyspnea or wheezing 30 vial 0     MULTI-VITAMIN OR DAILY       Omega-3 Fatty Acids (FISH OIL) 500 MG CAPS  "Take  by mouth. 1 daily         tolterodine (DETROL LA) 4 MG 24 hr capsule Take 1 capsule (4 mg) by mouth daily 90 capsule 3     triamcinolone (KENALOG) 0.1 % cream Apply to affected area sparingly twice daily as needed 30 g 1     Triamcinolone Acetonide (NASACORT ALLERGY 24HR NA)        ipratropium - albuterol 0.5 mg/2.5 mg/3 mL (DUONEB) 0.5-2.5 (3) MG/3ML neb solution Take 1 vial (3 mLs) by nebulization once for 1 dose 3 mL 0     oxyCODONE-acetaminophen (PERCOCET) 5-325 MG tablet Take 1-2 tablets by mouth every 4 hours as needed for moderate to severe pain (Patient not taking: Reported on 12/13/2018) 15 tablet 0     senna-docusate (SENOKOT-S/PERICOLACE) 8.6-50 MG tablet Take 1-2 tablets by mouth 2 times daily as needed for constipation (While on oral opioids.) (Patient not taking: Reported on 12/13/2018) 30 tablet 0         Allergies   Allergen Reactions     Citalopram      Thinking changes     Flonase [Fluticasone Propionate]      Sulfa Drugs Rash     Sulfasalazine Rash       Social History:  Social History     Tobacco Use     Smoking status: Never Smoker     Smokeless tobacco: Never Used   Substance Use Topics     Alcohol use: Yes     Alcohol/week: 1.0 oz     Comment: 1-2 a month       Family History:   The patient's family history is notable for cardiovascular disease.  Family History   Problem Relation Age of Onset     Cancer Maternal Grandmother         pelvic     Ovarian Cancer Maternal Grandmother      Neurologic Disorder Sister         brain aneurysm     Hypertension Father      Asthma Father      Pacemaker Father      Diabetes Maternal Grandfather      Allergies Brother      Cerebrovascular Disease Sister      C.A.D. No family hx of          Physical Exam:     /74 (BP Location: Right arm, Patient Position: Chair, Cuff Size: Adult Regular)   Pulse 85   Temp 98.1  F (36.7  C) (Oral)   Resp 14   Ht 1.676 m (5' 5.98\")   Wt 62.4 kg (137 lb 8 oz)   SpO2 97%   BMI 22.20 kg/m    Body mass index is " 22.2 kg/m .    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    I reviewed pathology in detail with patient and based on her low risk status, we recommend annual evaluation to include Ca-125 and clinical exam.    Recovering from surgery without complications.      Genesis Hicks M.D., MPH,  F.A.C.O.G.    Division of Gynecologic Oncology

## 2018-12-13 NOTE — NURSING NOTE
"Oncology Rooming Note    December 13, 2018 2:19 PM   Wendy Francis is a 63 year old female who presents for:    Chief Complaint   Patient presents with     Oncology Clinic Visit     [facility] OP CERVICAL CA     Initial Vitals: /74 (BP Location: Right arm, Patient Position: Chair, Cuff Size: Adult Regular)   Pulse 85   Temp 98.1  F (36.7  C) (Oral)   Resp 14   Ht 1.676 m (5' 5.98\")   Wt 62.4 kg (137 lb 8 oz)   SpO2 97%   BMI 22.20 kg/m   Estimated body mass index is 22.2 kg/m  as calculated from the following:    Height as of this encounter: 1.676 m (5' 5.98\").    Weight as of this encounter: 62.4 kg (137 lb 8 oz). Body surface area is 1.7 meters squared.  No Pain (0) Comment: Data Unavailable   No LMP recorded. Patient is postmenopausal.  Allergies reviewed: Yes  Medications reviewed: Yes    Medications: Medication refills not needed today.  Pharmacy name entered into EPIC:    [facility] - [place], [place] - [address] [place] [address] [facility] [facility] - [place], [place] - [address] [place] [address] PHARMACY #[id number] [place], [place] - [address] [place] [address]    Clinical concerns: No new concerns. Fabiola was notified.    10 minutes for nursing intake (face to face time)     Gentry Anguiano LPN            "

## 2019-01-09 ENCOUNTER — TELEPHONE (OUTPATIENT)
Dept: FAMILY MEDICINE | Facility: CLINIC | Age: 64
End: 2019-01-09

## 2019-01-09 ENCOUNTER — MYC MEDICAL ADVICE (OUTPATIENT)
Dept: FAMILY MEDICINE | Facility: CLINIC | Age: 64
End: 2019-01-09

## 2019-01-09 DIAGNOSIS — J30.9 ALLERGIC RHINITIS, UNSPECIFIED SEASONALITY, UNSPECIFIED TRIGGER: ICD-10-CM

## 2019-01-09 DIAGNOSIS — J33.9 NASAL POLYPOSIS: Primary | ICD-10-CM

## 2019-01-09 DIAGNOSIS — J33.9 NASAL POLYPOSIS: ICD-10-CM

## 2019-01-09 DIAGNOSIS — J45.30 MILD PERSISTENT ASTHMA WITHOUT COMPLICATION: ICD-10-CM

## 2019-01-09 RX ORDER — BUDESONIDE AND FORMOTEROL FUMARATE DIHYDRATE 80; 4.5 UG/1; UG/1
2 AEROSOL RESPIRATORY (INHALATION) 2 TIMES DAILY
Qty: 3 INHALER | Refills: 3 | Status: CANCELLED | OUTPATIENT
Start: 2019-01-09

## 2019-01-09 NOTE — TELEPHONE ENCOUNTER
Budesonide 2mg/L Irrig. Soln   (compound)  Last Written Prescription Date: 5/14/18    Last Fill Quantity:2000ml,   # refills: 3  Last Office Visit: 11/21/18  Future Office visit:   NA

## 2019-01-09 NOTE — TELEPHONE ENCOUNTER
Please call patient to clarify request - medication list shows nasacort allergy instead  Rosanna Lenz MD

## 2019-02-14 ENCOUNTER — TELEPHONE (OUTPATIENT)
Dept: FAMILY MEDICINE | Facility: CLINIC | Age: 64
End: 2019-02-14

## 2019-02-21 ENCOUNTER — ANCILLARY PROCEDURE (OUTPATIENT)
Dept: MAMMOGRAPHY | Facility: CLINIC | Age: 64
End: 2019-02-21
Attending: FAMILY MEDICINE
Payer: COMMERCIAL

## 2019-02-21 DIAGNOSIS — Z12.31 VISIT FOR SCREENING MAMMOGRAM: ICD-10-CM

## 2019-02-21 PROCEDURE — 77067 SCR MAMMO BI INCL CAD: CPT | Mod: TC

## 2019-06-30 ENCOUNTER — OFFICE VISIT (OUTPATIENT)
Dept: URGENT CARE | Facility: URGENT CARE | Age: 64
End: 2019-06-30
Payer: COMMERCIAL

## 2019-06-30 VITALS
SYSTOLIC BLOOD PRESSURE: 147 MMHG | DIASTOLIC BLOOD PRESSURE: 65 MMHG | OXYGEN SATURATION: 94 % | BODY MASS INDEX: 21.25 KG/M2 | HEART RATE: 98 BPM | TEMPERATURE: 98 F | WEIGHT: 131.6 LBS

## 2019-06-30 DIAGNOSIS — J45.21 MILD INTERMITTENT ASTHMA WITH EXACERBATION: Primary | ICD-10-CM

## 2019-06-30 PROCEDURE — 99214 OFFICE O/P EST MOD 30 MIN: CPT | Performed by: FAMILY MEDICINE

## 2019-06-30 RX ORDER — IPRATROPIUM BROMIDE AND ALBUTEROL SULFATE 2.5; .5 MG/3ML; MG/3ML
1 SOLUTION RESPIRATORY (INHALATION) EVERY 6 HOURS PRN
Qty: 30 VIAL | Refills: 0 | Status: SHIPPED | OUTPATIENT
Start: 2019-06-30 | End: 2019-11-20

## 2019-06-30 RX ORDER — ALBUTEROL SULFATE 90 UG/1
2 AEROSOL, METERED RESPIRATORY (INHALATION) EVERY 4 HOURS PRN
Qty: 1 INHALER | Refills: 4 | Status: SHIPPED | OUTPATIENT
Start: 2019-06-30 | End: 2019-11-20

## 2019-06-30 RX ORDER — PREDNISONE 20 MG/1
40 TABLET ORAL DAILY
Qty: 10 TABLET | Refills: 0 | Status: SHIPPED | OUTPATIENT
Start: 2019-06-30 | End: 2019-08-29

## 2019-06-30 NOTE — PATIENT INSTRUCTIONS
"  Patient Education     Asthma (Adult)  Asthma is a disease where the medium and  small air passages within the lung go into spasm and restrict the flow of air. Inflammation and swelling of the airways cause further blockage. During an acute asthma attack, these factors cause trouble breathing, wheezing, cough and chest tightness.    An asthma attack can be triggered by many things. Common triggers include infections such as the common cold, bronchitis, and pneumonia. Irritants such as smoke or pollutants in the air, very cold air, emotional upset, and exercise can also trigger an attack. In many adults with asthma, allergies to dust, mold, pollen and animal dander can cause an asthma attack. Skipping doses of daily asthma medicine can also bring on an asthma attack.  Asthma can be controlled using the proper medicines prescribed by your healthcare provider and avoiding exposure to known triggers including allergens and irritants.  Home care    Take prescribed medicine exactly at the times advised. If you need medicine such as from a hand held inhaler or aerosol breathing machine more than every 4 hours, contact your healthcare provider or seek immediate medical attention. If prescribed an antibiotic or prednisone, take all of the medicine as prescribed, even if you are feeling better after a few days.    Don't smoke. Avoid being exposed to the smoke of others.    Some people with asthma have worsening of their symptoms when they take aspirin and non-steroidal or fever-reducing medicines like ibuprofen and naproxen. Talk to your healthcare provider if you think this may apply to you.  Follow-up care  Follow up with your healthcare provider, or as advised. Always bring all of your current medicines to any appointments with your healthcare provider. Also bring a complete list of medicines even those not taken for asthma. If you don't already have one, talk to your healthcare provider about developing your own \"Asthma " "Action Plan.\"  A pneumococcal (pneumonia) vaccine and yearly flu shot (every fall) are recommended. Ask your doctor about this.  When to seek medical advice  Call your healthcare provider right away if any of these occur:     Increased wheezing or shortness of breath    Need to use your inhalers more often than usual without relief    Fever of 100.4 F (38 C) or higher, or as directed by your healthcare provider    Coughing up lots of dark-colored or bloody sputum (mucus)    Chest pain with each breath    If you use a peak flow meter as part of an Asthma Action Plan, and you are still in the yellow zone (50% to 80%) 15 minutes after using inhaler medicine.  Call 911  Call 911 if any of the following occur    Trouble walking or talking because of shortness of breath    If you use a peak flow meter as part of an Asthma Action Plan and you are still in the red zone (less than 50%) 15 minutes after using inhaler medicine    Lips or fingernails turning gray or blue  Date Last Reviewed: 5/1/2017 2000-2018 [employer]. [address] [place] [address], [place] [address]. All rights reserved. This information is not intended as a substitute for professional medical care. Always follow your healthcare professional's instructions.           "

## 2019-06-30 NOTE — PROGRESS NOTES
Subjective     Wendy Francis is a 64 year old female who presents to clinic today for the following health issues:    HPI   Chief Complaint   Patient presents with     Asthma     Patient complains of tightness in chest        Duration: 3 days     Description (location/character/radiation): chest tightness, productive cough, wheezing, nasal congestion     Intensity:  moderate    Accompanying signs and symptoms: no fever, chills     History (similar episodes/previous evaluation): asthma     Precipitating or alleviating factors: None    Therapies tried and outcome:  Mucinex, symbicort, albuterol, duoneb     No chest pain or palpitation         Patient Active Problem List   Diagnosis     Mild persistent asthma     Overactive bladder     History of cervical cancer     CARDIOVASCULAR SCREENING; LDL GOAL LESS THAN 160     Allergic rhinitis     Advanced directives, counseling/discussion     Hypertension goal BP (blood pressure) < 150/90     Nasal polyposis     Cystadenofibroma of ovary     Liver mass     Past Surgical History:   Procedure Laterality Date     COLONOSCOPY WITH CO2 INSUFFLATION N/A 4/12/2017    Procedure: COLONOSCOPY WITH CO2 INSUFFLATION;  Surgeon: Anatoly Do MD;  Location: [facility] OR     COLPOSCOPY,LOOP ELECTRD CERVIX EXCIS  2000    Radiation & chemotherapy for cervical cancer. Diagnosed in 2000     CYSTOSCOPY N/A 11/30/2018    Procedure: CYSTOSCOPY;  Surgeon: Genesis Hicks MD;  Location: [facility] OR      CORRECT BUNION,SIMPLE  over 10 years ago    ESTEPHANIE     LAPAROSCOPIC SALPINGO-OOPHORECTOMY Bilateral 11/30/2018    Procedure: Laparoscopic Bilateral Salpingo-Oophorectomy, Pelvic washings, cystoscopy;  Surgeon: Genesis Hicks MD;  Location: [facility] OR     SINUS SURGERY      x 2, Nasal polyps removed       Social History     Tobacco Use     Smoking status: Never Smoker     Smokeless tobacco: Never Used   Substance Use Topics     Alcohol use: Yes     Alcohol/week: 1.0 oz     Comment: 1-2 a month     Family  History   Problem Relation Age of Onset     Cancer Maternal Grandmother         pelvic     Ovarian Cancer Maternal Grandmother      Neurologic Disorder Sister         brain aneurysm     Hypertension Father      Asthma Father      Pacemaker Father      Diabetes Maternal Grandfather      Allergies Brother      Cerebrovascular Disease Sister      C.A.D. No family hx of          Current Outpatient Medications   Medication Sig Dispense Refill     albuterol (PROAIR HFA/PROVENTIL HFA/VENTOLIN HFA) 108 (90 BASE) MCG/ACT Inhaler Inhale 2 puffs into the lungs every 4 hours as needed for shortness of breath / dyspnea 1 Inhaler 4     budesonide-formoterol (SYMBICORT) 80-4.5 MCG/ACT Inhaler Inhale 2 puffs into the lungs 2 times daily 3 Inhaler 3     cetirizine (ZYRTEC) 10 MG tablet Take 1 tablet by mouth daily as needed for allergies.       COMPOUNDED NON-CONTROLLED SUBSTANCE (CMPD RX) - PHARMACY TO MIX COMPOUNDED MEDICATION Budesonide 2 mg/L irrigation solution. Rinse with 20 mls in each nostril two times a day 2000 mL 3     ipratropium - albuterol 0.5 mg/2.5 mg/3 mL (DUONEB) 0.5-2.5 (3) MG/3ML neb solution Take 1 vial (3 mLs) by nebulization every 6 hours as needed for shortness of breath / dyspnea or wheezing 30 vial 0     MULTI-VITAMIN OR DAILY       Omega-3 Fatty Acids (FISH OIL) 500 MG CAPS Take  by mouth. 1 daily         tolterodine (DETROL LA) 4 MG 24 hr capsule Take 1 capsule (4 mg) by mouth daily 90 capsule 3     triamcinolone (KENALOG) 0.1 % cream Apply to affected area sparingly twice daily as needed 30 g 1     Triamcinolone Acetonide (NASACORT ALLERGY 24HR NA)        senna-docusate (SENOKOT-S/PERICOLACE) 8.6-50 MG tablet Take 1-2 tablets by mouth 2 times daily as needed for constipation (While on oral opioids.) (Patient not taking: Reported on 12/13/2018) 30 tablet 0     Allergies   Allergen Reactions     Citalopram      Thinking changes     Flonase [Fluticasone Propionate]      Sulfa Drugs Rash     Sulfasalazine  Rash     Recent Labs   Lab Test 11/30/18  0615 06/02/15  0810 04/06/12  1002   LDL  --  105 97   HDL  --  66 59   TRIG  --  53 62   CR 0.89  --   --    GFRESTIMATED 64  --   --    GFRESTBLACK 77  --   --    POTASSIUM 3.4  --   --       BP Readings from Last 3 Encounters:   06/30/19 147/65   12/13/18 141/74   11/30/18 159/84    Wt Readings from Last 3 Encounters:   06/30/19 59.7 kg (131 lb 9.6 oz)   12/13/18 62.4 kg (137 lb 8 oz)   11/30/18 63 kg (138 lb 14.2 oz)                  Reviewed and updated as needed this visit by Provider         Review of Systems   ROS COMP: Constitutional, HEENT, cardiovascular, pulmonary, GI, , musculoskeletal, neuro, skin, endocrine and psych systems are negative, except as otherwise noted.      Objective    /65 (BP Location: Left arm, Patient Position: Chair, Cuff Size: Adult Regular)   Pulse 98   Temp 98  F (36.7  C) (Oral)   Wt 59.7 kg (131 lb 9.6 oz)   SpO2 94%   BMI 21.25 kg/m    Body mass index is 21.25 kg/m .  Physical Exam   GENERAL: alert and no distress  EYES: Eyes grossly normal to inspection, PERRL and conjunctivae and sclerae normal  HENT: normal cephalic/atraumatic, nose and mouth without ulcers or lesions, oropharynx clear and oral mucous membranes moist  NECK: no adenopathy, no asymmetry, masses, or scars and thyroid normal to palpation  RESP: Occasional wheeze bilaterally, no rhonchi or rales auscultated  CV: regular rates and rhythm, normal S1 S2, no S3 or S4, no murmur, click or rub and peripheral pulses strong  ABDOMEN: soft, nontender  MS: no gross musculoskeletal defects noted, no edema      Assessment & Plan     (J45.21) Mild intermittent asthma with exacerbation  (primary encounter diagnosis)  Comment: Suspect symptoms secondary to mild asthma exacerbation.  Differentials discussed in detail including coronary ischemia, pulmonary embolism and pneumonia.  Prednisone prescribed, common side effects discussed.  Albuterol and DuoNeb refilled.  Suggested  to use peak flow at home.  Instructed to go ER if symptoms persist or worsen otherwise follow-up with PCP next week.  Patient understood and in agreement with above plan.  All questions answered.  Plan: predniSONE (DELTASONE) 20 MG tablet, albuterol         (PROAIR HFA/PROVENTIL HFA/VENTOLIN HFA) 108 (90        Base) MCG/ACT inhaler, ipratropium - albuterol         0.5 mg/2.5 mg/3 mL (DUONEB) 0.5-2.5 (3) MG/3ML         neb solution                 Patient Instructions     Patient Education     Asthma (Adult)  Asthma is a disease where the medium and  small air passages within the lung go into spasm and restrict the flow of air. Inflammation and swelling of the airways cause further blockage. During an acute asthma attack, these factors cause trouble breathing, wheezing, cough and chest tightness.    An asthma attack can be triggered by many things. Common triggers include infections such as the common cold, bronchitis, and pneumonia. Irritants such as smoke or pollutants in the air, very cold air, emotional upset, and exercise can also trigger an attack. In many adults with asthma, allergies to dust, mold, pollen and animal dander can cause an asthma attack. Skipping doses of daily asthma medicine can also bring on an asthma attack.  Asthma can be controlled using the proper medicines prescribed by your healthcare provider and avoiding exposure to known triggers including allergens and irritants.  Home care    Take prescribed medicine exactly at the times advised. If you need medicine such as from a hand held inhaler or aerosol breathing machine more than every 4 hours, contact your healthcare provider or seek immediate medical attention. If prescribed an antibiotic or prednisone, take all of the medicine as prescribed, even if you are feeling better after a few days.    Don't smoke. Avoid being exposed to the smoke of others.    Some people with asthma have worsening of their symptoms when they take aspirin and  "non-steroidal or fever-reducing medicines like ibuprofen and naproxen. Talk to your healthcare provider if you think this may apply to you.  Follow-up care  Follow up with your healthcare provider, or as advised. Always bring all of your current medicines to any appointments with your healthcare provider. Also bring a complete list of medicines even those not taken for asthma. If you don't already have one, talk to your healthcare provider about developing your own \"Asthma Action Plan.\"  A pneumococcal (pneumonia) vaccine and yearly flu shot (every fall) are recommended. Ask your doctor about this.  When to seek medical advice  Call your healthcare provider right away if any of these occur:     Increased wheezing or shortness of breath    Need to use your inhalers more often than usual without relief    Fever of 100.4 F (38 C) or higher, or as directed by your healthcare provider    Coughing up lots of dark-colored or bloody sputum (mucus)    Chest pain with each breath    If you use a peak flow meter as part of an Asthma Action Plan, and you are still in the yellow zone (50% to 80%) 15 minutes after using inhaler medicine.  Call 911  Call 911 if any of the following occur    Trouble walking or talking because of shortness of breath    If you use a peak flow meter as part of an Asthma Action Plan and you are still in the red zone (less than 50%) 15 minutes after using inhaler medicine    Lips or fingernails turning gray or blue  Date Last Reviewed: 5/1/2017 2000-2018 [employer]. [address] [place] [address], [place] [address]. All rights reserved. This information is not intended as a substitute for professional medical care. Always follow your healthcare professional's instructions.                 Jordon Delvalle MD  [facility]        "

## 2019-07-22 DIAGNOSIS — N32.81 OVERACTIVE BLADDER: ICD-10-CM

## 2019-07-23 RX ORDER — TOLTERODINE 4 MG/1
CAPSULE, EXTENDED RELEASE ORAL
Qty: 90 CAPSULE | Refills: 0 | Status: SHIPPED | OUTPATIENT
Start: 2019-07-23 | End: 2019-10-20

## 2019-07-24 NOTE — TELEPHONE ENCOUNTER
Prescription approved per [facility] Refill Protocol.  Leela Davis RN

## 2019-08-02 ENCOUNTER — TRANSFERRED RECORDS (OUTPATIENT)
Dept: HEALTH INFORMATION MANAGEMENT | Facility: CLINIC | Age: 64
End: 2019-08-02

## 2019-08-04 DIAGNOSIS — J45.30 MILD PERSISTENT ASTHMA WITHOUT COMPLICATION: ICD-10-CM

## 2019-08-05 NOTE — TELEPHONE ENCOUNTER
"Routing refill request to provider for review/approval because:  Failed [facility] refill protocol, see below:    ACT Total Scores 7/25/2017 8/6/2018 11/21/2018   ACT TOTAL SCORE - - -   ASTHMA ER VISITS - - -   ASTHMA HOSPITALIZATIONS - - -   ACT TOTAL SCORE (Goal Greater than or Equal to 20) 23 25 22   In the past 12 months, how many times did you visit the emergency room for your asthma without being admitted to the hospital? 1 0 0   In the past 12 months, how many times were you hospitalized overnight because of your asthma? 1 1 0     Requested Prescriptions   Pending Prescriptions Disp Refills     SYMBICORT 80-4.5 MCG/ACT Inhaler [Pharmacy Med Name: SYMBICORT INH AEROSOL 80/4.5]  Last Written Prescription Date:  8/6/18  Last Fill Quantity: 3,  # refills: 3   Last office visit: 11/21/2018 with prescribing provider:     Future Office Visit:   30.6 g 3     Sig: USE 2 INHALATIONS TWICE A DAY       Inhaled Steroids Protocol Failed - 8/5/2019  7:46 AM        Failed - Asthma control assessment score within normal limits in last 6 months     Please review ACT score.           Passed - Patient is age 12 or older        Passed - Medication is active on med list        Passed - Recent (6 mo) or future (30 days) visit within the authorizing provider's specialty     Patient had office visit in the last 6 months or has a visit in the next 30 days with authorizing provider or within the authorizing provider's specialty.  See \"Patient Info\" tab in inbasket, or \"Choose Columns\" in Meds & Orders section of the refill encounter.            Niurka Rowley RN - [facility]      "

## 2019-08-06 RX ORDER — DILTIAZEM HYDROCHLORIDE 60 MG/1
TABLET, FILM COATED ORAL
Qty: 30.6 G | Refills: 0 | Status: SHIPPED | OUTPATIENT
Start: 2019-08-06 | End: 2019-11-03

## 2019-08-09 ENCOUNTER — MYC MEDICAL ADVICE (OUTPATIENT)
Dept: FAMILY MEDICINE | Facility: CLINIC | Age: 64
End: 2019-08-09

## 2019-08-09 DIAGNOSIS — R93.89 ABNORMAL CXR: Primary | ICD-10-CM

## 2019-08-09 DIAGNOSIS — R91.8 LUNG NODULES: ICD-10-CM

## 2019-08-09 NOTE — TELEPHONE ENCOUNTER
Patient dropped off USB with xray reports from her Urgent Care visit in [place]. I put the USB in Dr. Lenz's box.    Elmer Lundy

## 2019-08-15 NOTE — TELEPHONE ENCOUNTER
[facility] [place] called [phone] ask them to fax paper copies of x-ray to [facility] fax [phone].  Kathy Winn,

## 2019-08-20 ENCOUNTER — ANCILLARY PROCEDURE (OUTPATIENT)
Dept: CT IMAGING | Facility: CLINIC | Age: 64
End: 2019-08-20
Attending: FAMILY MEDICINE
Payer: COMMERCIAL

## 2019-08-20 DIAGNOSIS — R93.89 ABNORMAL CXR: ICD-10-CM

## 2019-08-20 PROCEDURE — 71250 CT THORAX DX C-: CPT | Mod: TC

## 2019-08-21 NOTE — RESULT ENCOUNTER NOTE
Please call patient:    Follow-up with me to discuss these results.     You have several lung nodules, which are likely benign. Follow-up chest CT is recommended in 6 months. Call the imaging center at [phone] or  [phone] to schedule your CT.    Rosanna Lenz MD

## 2019-08-26 ENCOUNTER — MYC MEDICAL ADVICE (OUTPATIENT)
Dept: FAMILY MEDICINE | Facility: CLINIC | Age: 64
End: 2019-08-26

## 2019-08-26 NOTE — TELEPHONE ENCOUNTER
See [employer] message below- should patient schedule a visit>  Patient responding to result message:   Follow-up with me to discuss these results.   You have several lung nodules, which are likely benign. Follow-up chest CT is recommended in 6 months. Call the imaging center at [phone] or  [phone] to schedule your CT.   Rosanna Bañuelos RN

## 2019-08-27 ENCOUNTER — MYC MEDICAL ADVICE (OUTPATIENT)
Dept: FAMILY MEDICINE | Facility: CLINIC | Age: 64
End: 2019-08-27

## 2019-08-28 DIAGNOSIS — J45.30 MILD PERSISTENT ASTHMA WITHOUT COMPLICATION: ICD-10-CM

## 2019-08-28 DIAGNOSIS — J33.9 NASAL POLYPOSIS: ICD-10-CM

## 2019-08-28 DIAGNOSIS — J30.9 ALLERGIC RHINITIS, UNSPECIFIED SEASONALITY, UNSPECIFIED TRIGGER: ICD-10-CM

## 2019-08-28 NOTE — TELEPHONE ENCOUNTER
Dr. Lenz,  Please see [employer] message below. Thanks.    Ninfa Lakhani RN  [facility]

## 2019-08-28 NOTE — TELEPHONE ENCOUNTER
Patient is looking at getting her Budesonide 2mg/l Nasal irrigation #2000ml.  Let me know if you have any questions or need anymore information.        Thanks      Todd Beltran  Compounding Pharmacy Technician  [facility]   [address] [place] [address]   Phone: [phone]  Fax: [phone]

## 2019-10-20 DIAGNOSIS — N32.81 OVERACTIVE BLADDER: ICD-10-CM

## 2019-10-22 NOTE — TELEPHONE ENCOUNTER
If patient return call. Please schedule her for a physical with DR. Delfin Polo CMA on 10/22/2019 at 2:50 PM

## 2019-10-22 NOTE — TELEPHONE ENCOUNTER
Left patient VM to return call to [facility] to schedule for a physical  Hli DMITRI Polo on 10/22/2019 at 9:00 AM

## 2019-10-23 RX ORDER — TOLTERODINE 4 MG/1
4 CAPSULE, EXTENDED RELEASE ORAL DAILY
Qty: 30 CAPSULE | Refills: 0 | Status: SHIPPED | OUTPATIENT
Start: 2019-10-23 | End: 2019-11-20

## 2019-10-23 NOTE — TELEPHONE ENCOUNTER
Signed Prescriptions:                        Disp   Refills    tolterodine ER (DETROL LA) 4 MG 24 hr caps*30 cap*0        Sig: Take 1 capsule (4 mg) by mouth daily Office visit           needed for refills.  Authorizing Provider: CASSANDRA LA  Ordering User: MYLENE MURPHY    Medication is being filled for 1 time refill only due to:  Patient needs to be seen because patient is due for yearly f/u. Patient does have appointment scheduled. .     Mylene Murphy RN on 10/23/2019 at 12:52 PM

## 2019-10-30 ENCOUNTER — MYC MEDICAL ADVICE (OUTPATIENT)
Dept: FAMILY MEDICINE | Facility: CLINIC | Age: 64
End: 2019-10-30

## 2019-10-30 DIAGNOSIS — J33.9 NASAL POLYPOSIS: ICD-10-CM

## 2019-10-30 DIAGNOSIS — J30.9 ALLERGIC RHINITIS, UNSPECIFIED SEASONALITY, UNSPECIFIED TRIGGER: ICD-10-CM

## 2019-10-30 DIAGNOSIS — J45.30 MILD PERSISTENT ASTHMA WITHOUT COMPLICATION: ICD-10-CM

## 2019-10-30 NOTE — TELEPHONE ENCOUNTER
Budesonide 2 MG/L Irrigation Solution  Last Written Prescription Date:  n/a  Last Fill Quantity: n/a,   # refills: n/a  Last Office Visit: 11/21/2018  Future Office visit:    Next 5 appointments (look out 90 days)    Nov 20, 2019  7:40 AM CST  [facility] Visit with Ros[facility] MD  [facility] [address]  [place] [address]  [phone]           Routing refill request to provider for review/approval because:  Drug not active on patient's medication list

## 2019-10-30 NOTE — TELEPHONE ENCOUNTER
Routing refill request to provider for review/approval because:  Drug not on the [facility] refill protocol     Requested Prescriptions   Pending Prescriptions Disp Refills     COMPOUNDED NON-CONTROLLED SUBSTANCE (CMPD RX) - PHARMACY TO MIX COMPOUNDED MEDICATION 2000 mL 0     Sig: Budesonide 2 mg/L irrigation solution. Rinse with 20 mls in each nostril two times a day       There is no refill protocol information for this order        Bailee Manzanares RN

## 2019-11-03 DIAGNOSIS — J45.30 MILD PERSISTENT ASTHMA WITHOUT COMPLICATION: ICD-10-CM

## 2019-11-05 RX ORDER — DILTIAZEM HYDROCHLORIDE 60 MG/1
TABLET, FILM COATED ORAL
Qty: 30.6 G | Refills: 0 | Status: SHIPPED | OUTPATIENT
Start: 2019-11-05 | End: 2019-11-20

## 2019-11-20 ENCOUNTER — OFFICE VISIT (OUTPATIENT)
Dept: FAMILY MEDICINE | Facility: CLINIC | Age: 64
End: 2019-11-20
Payer: COMMERCIAL

## 2019-11-20 ENCOUNTER — TRANSFERRED RECORDS (OUTPATIENT)
Dept: HEALTH INFORMATION MANAGEMENT | Facility: CLINIC | Age: 64
End: 2019-11-20

## 2019-11-20 VITALS
SYSTOLIC BLOOD PRESSURE: 126 MMHG | WEIGHT: 126 LBS | OXYGEN SATURATION: 96 % | DIASTOLIC BLOOD PRESSURE: 80 MMHG | BODY MASS INDEX: 19.78 KG/M2 | HEART RATE: 80 BPM | TEMPERATURE: 98.2 F | HEIGHT: 67 IN | RESPIRATION RATE: 16 BRPM

## 2019-11-20 DIAGNOSIS — Z85.41 HISTORY OF CERVICAL CANCER: ICD-10-CM

## 2019-11-20 DIAGNOSIS — R16.0 LIVER MASS: ICD-10-CM

## 2019-11-20 DIAGNOSIS — J45.30 MILD PERSISTENT ASTHMA WITHOUT COMPLICATION: Primary | ICD-10-CM

## 2019-11-20 DIAGNOSIS — I10 HYPERTENSION GOAL BP (BLOOD PRESSURE) < 140/90: ICD-10-CM

## 2019-11-20 DIAGNOSIS — L82.1 SEBORRHEIC KERATOSES: ICD-10-CM

## 2019-11-20 DIAGNOSIS — N32.81 OVERACTIVE BLADDER: ICD-10-CM

## 2019-11-20 DIAGNOSIS — Z11.4 SCREENING FOR HIV (HUMAN IMMUNODEFICIENCY VIRUS): ICD-10-CM

## 2019-11-20 DIAGNOSIS — R91.8 LUNG NODULES: ICD-10-CM

## 2019-11-20 PROCEDURE — 99214 OFFICE O/P EST MOD 30 MIN: CPT | Performed by: FAMILY MEDICINE

## 2019-11-20 PROCEDURE — 36415 COLL VENOUS BLD VENIPUNCTURE: CPT | Performed by: FAMILY MEDICINE

## 2019-11-20 PROCEDURE — 87389 HIV-1 AG W/HIV-1&-2 AB AG IA: CPT | Performed by: FAMILY MEDICINE

## 2019-11-20 RX ORDER — ALBUTEROL SULFATE 90 UG/1
2 AEROSOL, METERED RESPIRATORY (INHALATION) EVERY 4 HOURS PRN
Qty: 1 INHALER | Refills: 4 | Status: SHIPPED | OUTPATIENT
Start: 2019-11-20 | End: 2022-03-21

## 2019-11-20 RX ORDER — IPRATROPIUM BROMIDE AND ALBUTEROL SULFATE 2.5; .5 MG/3ML; MG/3ML
1 SOLUTION RESPIRATORY (INHALATION) EVERY 6 HOURS PRN
Qty: 30 VIAL | Refills: 3 | Status: SHIPPED | OUTPATIENT
Start: 2019-11-20 | End: 2019-11-20

## 2019-11-20 RX ORDER — BUDESONIDE AND FORMOTEROL FUMARATE DIHYDRATE 80; 4.5 UG/1; UG/1
AEROSOL RESPIRATORY (INHALATION)
Qty: 30.6 G | Refills: 3 | Status: SHIPPED | OUTPATIENT
Start: 2019-11-20 | End: 2020-10-03

## 2019-11-20 RX ORDER — TOLTERODINE 4 MG/1
4 CAPSULE, EXTENDED RELEASE ORAL DAILY
Qty: 90 CAPSULE | Refills: 3 | Status: SHIPPED | OUTPATIENT
Start: 2019-11-20 | End: 2020-02-24

## 2019-11-20 RX ORDER — IPRATROPIUM BROMIDE AND ALBUTEROL SULFATE 2.5; .5 MG/3ML; MG/3ML
1 SOLUTION RESPIRATORY (INHALATION) EVERY 6 HOURS PRN
Qty: 30 VIAL | Refills: 3 | Status: SHIPPED | OUTPATIENT
Start: 2019-11-20 | End: 2022-03-21

## 2019-11-20 RX ORDER — AMLODIPINE BESYLATE 5 MG/1
5 TABLET ORAL DAILY
Qty: 90 TABLET | Refills: 0 | Status: SHIPPED | OUTPATIENT
Start: 2019-11-20 | End: 2019-11-20

## 2019-11-20 ASSESSMENT — MIFFLIN-ST. JEOR: SCORE: 1159.16

## 2019-11-20 NOTE — LETTER
My Asthma Action Plan    Name: Wendy Francis   YOB: 1955  Date: 11/20/2019   My doctor: Rosanna Lenz MD   My clinic: [facility]        My Control Medicine: Budesonide + formoterol (Symbicort HFA) -  160/4.5 mcg 2 puffs twice daily  My Rescue Medicine: Albuterol (Proair/Ventolin/Proventil HFA) 2-4 puffs EVERY 4 HOURS as needed. Use a spacer if recommended by your provider.   My Asthma Severity:   Moderate Persistent  Know your asthma triggers: upper respiratory infections               GREEN ZONE   Good Control    I feel good    No cough or wheeze    Can work, sleep and play without asthma symptoms       Take your asthma control medicine every day.     1. If exercise triggers your asthma, take your rescue medication    15 minutes before exercise or sports, and    During exercise if you have asthma symptoms  2. Spacer to use with inhaler: If you have a spacer, make sure to use it with your inhaler             YELLOW ZONE Getting Worse  I have ANY of these:    I do not feel good    Cough or wheeze    Chest feels tight    Wake up at night   1. Keep taking your Green Zone medications  2. Start taking your rescue medicine:    every 20 minutes for up to 1 hour. Then every 4 hours for 24-48 hours.  3. If you stay in the Yellow Zone for more than 12-24 hours, contact your doctor.  4. If you do not return to the Green Zone in 12-24 hours or you get worse, start taking your oral steroid medicine if prescribed by your provider.           RED ZONE Medical Alert - Get Help  I have ANY of these:    I feel awful    Medicine is not helping    Breathing getting harder    Trouble walking or talking    Nose opens wide to breathe       1. Take your rescue medicine NOW  2. If your provider has prescribed an oral steroid medicine, start taking it NOW  3. Call your doctor NOW  4. If you are still in the Red Zone after 20 minutes and you have not reached your doctor:    Take your rescue medicine again  and    Call 911 or go to the emergency room right away    See your regular doctor within 2 weeks of an Emergency Room or Urgent Care visit for follow-up treatment.          Annual Reminders:  Meet with Asthma Educator,  Flu Shot in the Fall, consider Pneumonia Vaccination for patients with asthma (aged 19 and older).    Pharmacy:    [facility] - [facility] [place] - [address] [facility] [address] [place], [place] [address] - [place], [place] - [address] [place] [address] PHARMACY #[id number] - [place], [place] - [address] [facility]                          Asthma Triggers  How To Control Things That Make Your Asthma Worse    Triggers are things that make your asthma worse.  Look at the list below to help you find your triggers and what you can do about them.  You can help prevent asthma flare-ups by staying away from your triggers.      Trigger                                                          What you can do   Cigarette Smoke  Tobacco smoke can make asthma worse. Do not allow smoking in your home, car or around you.  Be sure no one smokes at a child s day care or school.  If you smoke, ask your health care provider for ways to help you quit.  Ask family members to quit too.  Ask your health care provider for a referral to Quit Plan to help you quit smoking, or call 5-186-710-PLAN.     Colds, Flu, Bronchitis  These are common triggers of asthma. Wash your hands often.  Don t touch your eyes, nose or mouth.  Get a flu shot every year.     Dust Mites  These are tiny bugs that live in cloth or carpet. They are too small to see. Wash sheets and blankets in hot water every week.   Encase pillows and mattress in dust mite proof covers.  Avoid having carpet if you can. If you have carpet, vacuum weekly.   Use a dust mask and HEPA vacuum.   Pollen and Outdoor Mold  Some people are allergic to trees, grass, or weed pollen, or  molds. Try to keep your windows closed.  Limit time out doors when pollen count is high.   Ask you health care provider about taking medicine during allergy season.     Animal Dander  Some people are allergic to skin flakes, urine or saliva from pets with fur or feathers. Keep pets with fur or feathers out of your home.    If you can t keep the pet outdoors, then keep the pet out of your bedroom.  Keep the bedroom door closed.  Keep pets off cloth furniture and away from stuffed toys.     Mice, Rats, and Cockroaches   Some people are allergic to the waste from these pests.   Cover food and garbage.  Clean up spills and food crumbs.  Store grease in the refrigerator.   Keep food out of the bedroom.   Indoor Mold  This can be a trigger if your home has high moisture. Fix leaking faucets, pipes, or other sources of water.   Clean moldy surfaces.  Dehumidify basement if it is damp and smelly.   Smoke, Strong Odors, and Sprays  These can reduce air quality. Stay away from strong odors and sprays, such as perfume, powder, hair spray, paints, smoke incense, paint, cleaning products, candles and new carpet.   Exercise or Sports  Some people with asthma have this trigger. Be active!  Ask your doctor about taking medicine before sports or exercise to prevent symptoms.    Warm up for 5-10 minutes before and after sports or exercise.     Other Triggers of Asthma  Cold air:  Cover your nose and mouth with a scarf.  Sometimes laughing or crying can be a trigger.  Some medicines and food can trigger asthma.

## 2019-11-20 NOTE — PROGRESS NOTES
"Subjective     Wendy Francis is a 64 year old female who presents to clinic today for the following health issues:    History of Present Illness      Asthma:  She presents for follow up of asthma.  She has no cough, no wheezing, and some shortness of breath. She is not using a relief medication. She does not miss any doses of her controller medication throughout the week.Patient is aware of the following triggers: cold air. The patient has not had a visit to the Emergency Room, Urgent Care or Hospital due to asthma since the last clinic visit. She has been to the Emergency Room or Urgent Care 0 times.She has had a Hospitalization 0 times.    Hypertension: She presents for follow up of hypertension.  She does not check blood pressure  regularly outside of the clinic.  She follows a low salt diet.     She eats 0-1 servings of fruits and vegetables daily.She consumes 0 sweetened beverage(s) daily.  She is taking medications regularly.               Reviewed and updated as needed this visit by Provider  Tobacco  Allergies  Meds  Problems  Med Hx  Surg Hx  Fam Hx         Review of Systems   ROS COMP: CONSTITUTIONAL: NEGATIVE for fever, chills, change in weight  INTEGUMENTARY/SKIN: black lesion on back that fell off   ENT/MOUTH: nasal polyposis   RESP:NEGATIVE for significant cough or SOB and Hx asthma  CV: NEGATIVE for chest pain, palpitations or peripheral edema  GI: NEGATIVE for nausea, abdominal pain, heartburn, or change in bowel habits      Objective    /80   Pulse 80   Temp 98.2  F (36.8  C) (Oral)   Resp 16   Ht 1.702 m (5' 7\")   Wt 57.2 kg (126 lb)   SpO2 96%   Breastfeeding No   BMI 19.73 kg/m    Body mass index is 19.73 kg/m .  Physical Exam   GENERAL: healthy, alert and no distress  EYES: Eyes grossly normal to inspection, PERRL and conjunctivae and sclerae normal  NECK: no adenopathy, no asymmetry, masses, or scars and thyroid normal to palpation  RESP: lungs clear to auscultation - no " rales, rhonchi or wheezes  CV: regular rate and rhythm, normal S1 S2, no S3 or S4, no murmur, click or rub, no peripheral edema    MS: no gross musculoskeletal defects noted, no edema  SKIN: keratoses - seborrheic  PSYCH: mentation appears normal, affect normal/bright    Diagnostic Test Results:  Labs reviewed in Epic  No results found for any visits on 11/20/19.        Assessment & Plan     (J45.30) Mild persistent asthma without complication  (primary encounter diagnosis)  Comment: Well controlled with medications without side effects.   Plan: budesonide-formoterol (SYMBICORT) 80-4.5         MCG/ACT Inhaler, ipratropium - albuterol 0.5         mg/2.5 mg/3 mL (DUONEB) 0.5-2.5 (3) MG/3ML neb         solution         (L82.1) Seborrheic keratoses  Plan: The patient is reassured that these symptoms do not appear to represent a serious or threatening condition.     (I10) Hypertension goal BP (blood pressure) < 140/90  Plan: continue lifestyle strategies and follow-up yearly - offered pharmacy or ancillary recheck given initial high reading today - patient prefers to postpone medication     (N32.81) Overactive bladder  Comment: Well controlled with medications without side effects.   Plan: tolterodine ER (DETROL LA) 4 MG 24 hr capsule          (R91.8) Lung nodules  Plan: surveillance CT scheduled     (R16.0) Liver mass  Plan: MR Liver wo & w Contrast          (Z85.41) History of cervical cancer  Plan: she will ask her specialist about recommended future pap smear surveillance and frequency     (Z11.4) Screening for HIV (human immunodeficiency virus)  Plan: HIV Antigen Antibody Combo                  Return in about 6 weeks (around 1/1/2020) for blood pressure, free pharmacy blood pressure check (walk-in).    Rosanna Lenz MD  [facility]

## 2019-11-20 NOTE — PATIENT INSTRUCTIONS
Call the imaging center at [phone] or  [phone] to schedule your liver MRI.    It is recommended that you get a vaccination for shingles called Shingrix (given as 2 shots, 2 to 6 months apart), even if you have already had the Zostavax vaccine. Discuss getting the Shingix vaccine from your pharmacist, or schedule an ancillary shot visit here. Some insurances do not cover the cost of these vaccines.

## 2019-11-21 LAB — HIV 1+2 AB+HIV1 P24 AG SERPL QL IA: NONREACTIVE

## 2019-11-21 ASSESSMENT — ASTHMA QUESTIONNAIRES: ACT_TOTALSCORE: 22

## 2019-12-02 ENCOUNTER — ANCILLARY PROCEDURE (OUTPATIENT)
Dept: MRI IMAGING | Facility: CLINIC | Age: 64
End: 2019-12-02
Attending: FAMILY MEDICINE
Payer: COMMERCIAL

## 2019-12-02 DIAGNOSIS — R16.0 LIVER MASS: ICD-10-CM

## 2019-12-02 PROCEDURE — 74183 MRI ABD W/O CNTR FLWD CNTR: CPT | Mod: TC

## 2019-12-02 PROCEDURE — A9585 GADOBUTROL INJECTION: HCPCS

## 2019-12-02 RX ORDER — GADOBUTROL 604.72 MG/ML
5.5 INJECTION INTRAVENOUS ONCE
Status: COMPLETED | OUTPATIENT
Start: 2019-12-02 | End: 2019-12-02

## 2019-12-02 RX ADMIN — GADOBUTROL 5.5 ML: 604.72 INJECTION INTRAVENOUS at 10:40

## 2019-12-07 ENCOUNTER — TRANSFERRED RECORDS (OUTPATIENT)
Dept: HEALTH INFORMATION MANAGEMENT | Facility: CLINIC | Age: 64
End: 2019-12-07

## 2019-12-09 PROBLEM — C56.2: Status: ACTIVE | Noted: 2019-12-09

## 2019-12-09 NOTE — PROGRESS NOTES
Follow Up Notes on Referred Patient    Date: 12/10/2019       Dr. Rosanna Lenz MD  [address] [place] [address]       RE: Wendy Francis  : 1955  AR: 12/10/2019    Dear Dr. Rosanna Lenz:    Wendy Francis is a 64 year old woman with a diagnosis of Early stage serous borderline ovarian tumor.  She is here today for an annual surveillance visit.     History:     Her past gynecologic history is notable for cervical cancer diagnosed in  for which she underwent chemoradiation therapy completing this 2000 and has been without recurrence since her initial diagnosis. She underwent a laparotomy for lymph node dissection, which were positive.     She began to experience some lower extremity edema although does not clearly stated as lymphedema and was utilizing compression stockings most recently she noted that there was increasing swelling in the right lower extremity.  As per the course of evaluation lower externally ultrasound was performed which ruled out deep venous thrombosis and pelvic CT noted a 6.3 cm complex left ovarian cyst.  CT scan was performed on August 15 2018,  follow-up pelvic ultrasound performed on  noted.  Uterus at 4.3 cm with endometrial thickness of 0.2 cm with a left complex ovarian cyst with thin septation measuring 4 x 3.7 x 4.3 cm with no associated ascites.         2018  16     10/12/2018 Pelvic US  FINDINGS: The uterus is anteflexed, measuring 4.0 x 1.7 x 3.7 cm in  long axis, short axis and transverse dimensions, respectively. No  myometrial masses. The endometrial stripe measures 0.2 cm in  dual-layer thickness. A trace amount of fluid is present in the  endometrial cavity, within normal physiologic limits. The right ovary  is not visualized. The left ovary measures 6.2 x 5.5 x 4.6 cm. A 5.3 x  5.1 x 4.0 cm anechoic cyst containing a 1.5 cm internal echogenicity  is present in the  left ovary. No adnexal masses. No free fluid in the  pelvis.     11/30/2018  Laparoscopic bilateral salpingoophorectomy, cystoscopy     FINAL DIAGNOSIS:   A: Right ovary and fallopian tube, laparoscopic salpingo-oophorectomy:   - Ovarian serous cystadenofibroma (size 2.3 cm) and surface adhesions   - Fallopian tube with atrophic epithelium and mild hydrosalpinx, suggestive of radiation related changes   - Fallopian tube surface adhesions   - Uninvolved by borderline tumor   - Negative for malignancy     B: Left ovary and fallopian tube, laparoscopic salpingo-oophorectomy (including B1FS):   - Ovarian serous borderline tumor (size 6.5 cm)   - Ovarian surface uninvolved by borderline tumor   - Fallopian tube with atrophic epithelium and mild hydrosalpinx, suggestive of radiation related changes   - Fallopian tube surface adhesions   - Fallopian tube uninvolved by borderline tumor   - Negative for malignancy   - See comment           Today she comes to clinic feeling well and denies any concerns. She denies any vaginal bleeding, no changes in her bowel or bladder habits, no nausea/emesis, no lower extremity edema, and no difficulties eating or sleeping. She denies any abdominal discomfort/bloating, no fevers or chills, and no chest pain or shortness of breath. She is not sexually active and is not using a dilator. She recently had an MR pelvic for her right hip pain and there were no concerning findings in terms of her cancer. She is current with her mammogram and colonoscopy. She has questions from her PCP about her pap screening.           Review of Systems     Constitutional:  Negative for fever, chills, weight loss, weight gain, fatigue, decreased appetite, night sweats, recent stressors, height gain, height loss, post-operative complications, incisional pain, hallucinations, increased energy, hyperactivity and confused.   HENT:  Negative for ear pain, hearing loss, tinnitus, nosebleeds, trouble swallowing, hoarse  voice, mouth sores, sore throat, ear discharge, tooth pain, gum tenderness, taste disturbance, smell disturbance, hearing aid, bleeding gums, dry mouth, sinus pain, sinus congestion and neck mass.    Eyes:  Negative for double vision, pain, redness, eye pain, decreased vision, eye watering, eye bulging, eye dryness, flashing lights, spots, floaters, strabismus, tunnel vision, jaundice and eye irritation.   Respiratory:   Negative for cough, hemoptysis, sputum production, shortness of breath, wheezing, sleep disturbances due to breathing, snores loudly, respiratory pain, dyspnea on exertion, cough disturbing sleep and postural dyspnea.    Cardiovascular:  Negative for chest pain, dyspnea on exertion, palpitations, orthopnea, claudication, leg swelling, fingers/toes turn blue, hypertension, hypotension, syncope, history of heart murmur, chest pain on exertion, chest pain at rest, pacemaker, few scattered varicosities, leg pain, sleep disturbances due to breathing, tachycardia, light-headedness, exercise intolerance and edema.   Gastrointestinal:  Negative for heartburn, nausea, vomiting, abdominal pain, diarrhea, constipation, blood in stool, melena, rectal pain, bloating, hemorrhoids, bowel incontinence, jaundice, rectal bleeding, coffee ground emesis and change in stool.   Genitourinary:  Negative for bladder incontinence, dysuria, urgency, hematuria, flank pain, vaginal discharge, difficulty urinating, genital sores, dyspareunia, decreased libido, nocturia, voiding less frequently, arousal difficulty, abnormal vaginal bleeding, excessive menstruation, menstrual changes, hot flashes, vaginal dryness and postmenopausal bleeding.   Musculoskeletal:  Positive for muscle cramps. Negative for myalgias, back pain, joint swelling, arthralgias, stiffness, neck pain, bone pain, muscle weakness and fracture.   Skin:  Negative for nail changes, itching, poor wound healing, rash, hair changes, skin changes, acne, warts, poor  wound healing, scarring, flaky skin, Raynaud's phenomenon, sensitivity to sunlight and skin thickening.   Neurological:  Negative for dizziness, tingling, tremors, speech change, seizures, loss of consciousness, weakness, light-headedness, numbness, headaches, disturbances in coordination, extremity numbness, memory loss, difficulty walking and paralysis.   Endo/Heme:  Negative for anemia, swollen glands and bruises/bleeds easily.   Psychiatric/Behavioral:  Negative for depression, hallucinations, memory loss, decreased concentration, mood swings and panic attacks.    Breast:  Negative for breast discharge, breast mass, breast pain and nipple retraction.   Endocrine:  Negative for altered temperature regulation, polyphagia, polydipsia, unwanted hair growth and change in facial hair.          Past Medical History:    Past Medical History:   Diagnosis Date     Allergic rhinitis     molds, dust     Atrophic vaginitis      Cervical cancer (H) 4/10/2000    chemo and radiation, IB-II squamous cell, positive nodes     Degenerative joint disease of right hip      Diverticulosis of colon 6/2004     Hypertension goal BP (blood pressure) < 140/90      Liver hemangioma      Lung nodules      Mild persistent asthma      Mild recurrent major depression (H)      Nasal polyps      Overactive bladder      Sensorineural hearing losses      Tinnitus      Varicose vein of leg     No surgery         Past Surgical History:    Past Surgical History:   Procedure Laterality Date     COLONOSCOPY WITH CO2 INSUFFLATION N/A 4/12/2017    Procedure: COLONOSCOPY WITH CO2 INSUFFLATION;  Surgeon: Anatoly Do MD;  Location:  OR     COLPOSCOPY,LOOP ELECTRD CERVIX EXCIS  2000    Radiation & chemotherapy for cervical cancer. Diagnosed in 2000     CYSTOSCOPY N/A 11/30/2018    Procedure: CYSTOSCOPY;  Surgeon: Genesis Hicks MD;  Location:  OR      CORRECT BUNION,SIMPLE      ESTEPHANIE     LAPAROSCOPIC SALPINGO-OOPHORECTOMY Bilateral  11/30/2018    Procedure: Laparoscopic Bilateral Salpingo-Oophorectomy, Pelvic washings, cystoscopy;  Surgeon: Genesis Hicks MD;  Location: [facility] OR     SINUS SURGERY      x 2, Nasal polyps removed         Health Maintenance Due   Topic Date Due     PREVENTIVE CARE VISIT  08/06/2019     HPV  08/06/2019     PAP  08/06/2019       Current Medications:     Current Outpatient Medications   Medication Sig Dispense Refill     albuterol (PROAIR HFA/PROVENTIL HFA/VENTOLIN HFA) 108 (90 Base) MCG/ACT inhaler Inhale 2 puffs into the lungs every 4 hours as needed for shortness of breath / dyspnea 1 Inhaler 4     budesonide-formoterol (SYMBICORT) 80-4.5 MCG/ACT Inhaler USE 2 INHALATIONS TWICE A DAY 30.6 g 3     cetirizine (ZYRTEC) 10 MG tablet Take 1 tablet by mouth daily as needed for allergies.       ipratropium - albuterol 0.5 mg/2.5 mg/3 mL (DUONEB) 0.5-2.5 (3) MG/3ML neb solution Take 1 vial (3 mLs) by nebulization every 6 hours as needed for shortness of breath / dyspnea or wheezing 30 vial 3     MULTI-VITAMIN OR DAILY       Omega-3 Fatty Acids (FISH OIL) 500 MG CAPS Take  by mouth. 1 daily         tolterodine ER (DETROL LA) 4 MG 24 hr capsule Take 1 capsule (4 mg) by mouth daily 90 capsule 3     Triamcinolone Acetonide (NASACORT ALLERGY 24HR NA)            Allergies:        Allergies   Allergen Reactions     Citalopram      Thinking changes     Flonase [Fluticasone Propionate]      Sulfa Drugs Rash     Sulfasalazine Rash        Social History:     Social History     Tobacco Use     Smoking status: Never Smoker     Smokeless tobacco: Never Used   Substance Use Topics     Alcohol use: Yes     Alcohol/week: 1.7 standard drinks     Comment: 1-2 a month       History   Drug Use No         Family History:       Family History   Problem Relation Age of Onset     Cancer Maternal Grandmother         pelvic     Ovarian Cancer Maternal Grandmother      Neurologic Disorder Sister         brain aneurysm     Hypertension Father       Asthma Father      Pacemaker Father      Diabetes Maternal Grandfather      Allergies Brother      Coronary Artery Disease Paternal Grandmother      Cerebrovascular Disease Sister      C.A.D. No family hx of          Physical Exam:     BP (!) 148/90   Pulse 80   Temp 97.5  F (36.4  C) (Oral)   Resp 16   Wt 57.6 kg (127 lb)   LMP  (LMP Unknown)   SpO2 93%   Breastfeeding No   BMI 19.89 kg/m    Body mass index is 19.89 kg/m .    General Appearance: healthy and alert, no distress     HEENT: no thyromegaly, no palpable nodules or masses        Cardiovascular: regular rate and rhythm, no gallops, rubs or murmurs     Respiratory: lungs clear, no rales, rhonchi or wheezes, normal diaphragmatic excursion    Musculoskeletal: extremities non tender and without edema    Skin: no lesions or rashes     Neurological: normal gait, no gross defects     Psychiatric: appropriate mood and affect                               Hematological: normal cervical, supraclavicular and inguinal lymph nodes     Gastrointestinal:       abdomen soft, non-tender, non-distended, no organomegaly or masses    Genitourinary: External genitalia and urethral meatus appears normal.  Vagina is smooth without nodularity or masses; foreshortened.  Cervix not visualized; obliterated by radiation. Bimanual exam reveal no masses, nodularity or fullness.  Recto-vaginal exam confirms these findings..Pap collected.       Assessment:    Wendy Francis is a 64 year old woman with a diagnosis of Early stage serous borderline ovarian tumor.  She is here today for an annual surveillance visit.     25 minutes were spent with this patient, over 50% of that time was spent in symptom management, treatment planning and in counseling and coordination of care.      Plan:     1.)        Continue with annual pelvic/rectal exam which can be done here or with her local provider when she is also getting her cervical cancer follow up. Discussed given that her  was  not elevated prior to surgery we will not follow this. Reviewed signs and symptoms for when she should contact the clinic or seek additional care. Regarding her history of cervical cancer from 2000, recommend to continue to have an annual pap (no HPV) with her PCP. Reviewed recommendations from [facility] against performing colposcopy in patients treated for cervical cancer with Pap tests of LSIL or less. Colposcopy for LSIL in this group does not detect recurrence unless there is a visible lesion. Collected a pap on her today as she was overdue for this. Answered all of her questions to the best of my ability.      2.) Genetic risk factors were assessed and the patient does not meet the qualifications for a referral.      3.) Labs and/or tests ordered include:  Pap.      4.) Health maintenance issues addressed today include annual health maintenance and non-gynecologic issues with PCP. Encouraged to monitor her BP at outside locations and follow up with her PCP if readings remain elevated.     CAMILO Moura, WHNP-BC, ANP-BC  Women's Health Nurse Practitioner  Adult Nurse Pracitioner  Division of Gynecologic Oncology          CC  Patient Care Team:  Rosanna Lenz MD as PCP - General (Family Practice)  Rosanna Lenz MD as Assigned PCP  ROSANNA LENZ

## 2019-12-10 ENCOUNTER — MYC MEDICAL ADVICE (OUTPATIENT)
Dept: FAMILY MEDICINE | Facility: CLINIC | Age: 64
End: 2019-12-10

## 2019-12-10 ENCOUNTER — ONCOLOGY VISIT (OUTPATIENT)
Dept: ONCOLOGY | Facility: CLINIC | Age: 64
End: 2019-12-10
Attending: NURSE PRACTITIONER
Payer: COMMERCIAL

## 2019-12-10 VITALS
DIASTOLIC BLOOD PRESSURE: 90 MMHG | RESPIRATION RATE: 16 BRPM | HEART RATE: 80 BPM | OXYGEN SATURATION: 93 % | SYSTOLIC BLOOD PRESSURE: 148 MMHG | TEMPERATURE: 97.5 F | WEIGHT: 127 LBS | BODY MASS INDEX: 19.89 KG/M2

## 2019-12-10 DIAGNOSIS — C56.2: Primary | ICD-10-CM

## 2019-12-10 DIAGNOSIS — Z85.41 HISTORY OF CERVICAL CANCER: ICD-10-CM

## 2019-12-10 DIAGNOSIS — R03.0 ELEVATED BP WITHOUT DIAGNOSIS OF HYPERTENSION: ICD-10-CM

## 2019-12-10 DIAGNOSIS — M16.11 PRIMARY OSTEOARTHRITIS OF RIGHT HIP: ICD-10-CM

## 2019-12-10 PROCEDURE — 88175 CYTOPATH C/V AUTO FLUID REDO: CPT | Performed by: NURSE PRACTITIONER

## 2019-12-10 PROCEDURE — G0463 HOSPITAL OUTPT CLINIC VISIT: HCPCS | Mod: ZF

## 2019-12-10 PROCEDURE — 99214 OFFICE O/P EST MOD 30 MIN: CPT | Mod: ZP | Performed by: NURSE PRACTITIONER

## 2019-12-10 RX ORDER — PREDNISONE 20 MG/1
TABLET ORAL
COMMUNITY
End: 2019-12-10

## 2019-12-10 ASSESSMENT — ENCOUNTER SYMPTOMS
WEAKNESS: 0
SWOLLEN GLANDS: 0
DECREASED APPETITE: 0
LIGHT-HEADEDNESS: 0
LEG SWELLING: 0
MEMORY LOSS: 0
BOWEL INCONTINENCE: 0
RESPIRATORY PAIN: 0
SINUS CONGESTION: 0
PALPITATIONS: 0
HALLUCINATIONS: 0
DEPRESSION: 0
JAUNDICE: 0
PARALYSIS: 0
MUSCLE WEAKNESS: 0
HEMOPTYSIS: 0
TROUBLE SWALLOWING: 0
POOR WOUND HEALING: 0
FEVER: 0
HYPERTENSION: 0
SYNCOPE: 0
POSTURAL DYSPNEA: 0
DYSPNEA ON EXERTION: 0
PANIC: 0
SHORTNESS OF BREATH: 0
DECREASED CONCENTRATION: 0
COUGH: 0
HYPOTENSION: 0
SEIZURES: 0
HEADACHES: 0
ALTERED TEMPERATURE REGULATION: 0
SNORES LOUDLY: 0
EYE REDNESS: 0
EYE IRRITATION: 0
WHEEZING: 0
EXTREMITY NUMBNESS: 0
NECK MASS: 0
BACK PAIN: 0
INSOMNIA: 0
BREAST MASS: 0
FLANK PAIN: 0
DOUBLE VISION: 0
HEARTBURN: 0
SINUS PAIN: 0
ARTHRALGIAS: 0
DYSURIA: 0
ABDOMINAL PAIN: 0
NERVOUS/ANXIOUS: 0
NIGHT SWEATS: 0
BLOATING: 0
SMELL DISTURBANCE: 0
CHILLS: 0
VOMITING: 0
TREMORS: 0
MUSCLE CRAMPS: 1
ORTHOPNEA: 0
SORE THROAT: 0
HEMATURIA: 0
DISTURBANCES IN COORDINATION: 0
RECTAL BLEEDING: 0
NAIL CHANGES: 0
SPEECH CHANGE: 0
NECK PAIN: 0
EYE PAIN: 0
TASTE DISTURBANCE: 0
POLYDIPSIA: 0
RECTAL PAIN: 0
LEG PAIN: 0
POLYPHAGIA: 0
WEIGHT LOSS: 0
EYE WATERING: 0
SPUTUM PRODUCTION: 0
DIFFICULTY URINATING: 0
BRUISES/BLEEDS EASILY: 0
INCREASED ENERGY: 0
HOARSE VOICE: 0
DIARRHEA: 0
NUMBNESS: 0
SLEEP DISTURBANCES DUE TO BREATHING: 0
HOT FLASHES: 0
COUGH DISTURBING SLEEP: 0
BLOOD IN STOOL: 0
TACHYCARDIA: 0
BREAST PAIN: 0
DIZZINESS: 0
MYALGIAS: 0
STIFFNESS: 0
WEIGHT GAIN: 0
JOINT SWELLING: 0
EXERCISE INTOLERANCE: 0
DECREASED LIBIDO: 0
FATIGUE: 0
CONSTIPATION: 0
CLAUDICATION: 0
TINGLING: 0
LOSS OF CONSCIOUSNESS: 0
SKIN CHANGES: 0
NAUSEA: 0

## 2019-12-10 ASSESSMENT — PAIN SCALES - GENERAL: PAINLEVEL: MILD PAIN (2)

## 2019-12-10 NOTE — NURSING NOTE
"Oncology Rooming Note    December 10, 2019 5:23 PM   Wendy Francis is a 64 year old female who presents for:    Chief Complaint   Patient presents with     Oncology Clinic Visit     Return Serous cystadenoma of left ovary with borderline malignant features (H)     Initial Vitals: BP (!) 148/90   Pulse 80   Temp 97.5  F (36.4  C) (Oral)   Resp 16   Wt 57.6 kg (127 lb)   LMP  (LMP Unknown)   SpO2 93%   Breastfeeding No   BMI 19.89 kg/m   Estimated body mass index is 19.89 kg/m  as calculated from the following:    Height as of 11/20/19: 1.702 m (5' 7\").    Weight as of this encounter: 57.6 kg (127 lb). Body surface area is 1.65 meters squared.  Mild Pain (2) Comment: Hip pain   No LMP recorded (lmp unknown). Patient is postmenopausal.  Allergies reviewed: Yes  Medications reviewed: Yes    Medications: Medication refills not needed today.  Pharmacy name entered into EPIC:    [facility] [place] - [address] [facility]    Clinical concerns: No new concerns.         [facility]              "

## 2019-12-10 NOTE — LETTER
12/10/2019       RE: Wendy Francis  [address] [place] [address]     Dear Colleague,    Thank you for referring your patient, Wendy Francis, to the [facility]. Please see a copy of my visit note below.                Follow Up Notes on Referred Patient    Date: 12/10/2019       Dr. Rosanna Lenz MD  [address] [place] [address]       RE: Wendy Francis  : 1955  AR: 12/10/2019    Dear Dr. Rosanna Lenz:    Wendy Francis is a 64 year old woman with a diagnosis of Early stage serous borderline ovarian tumor.  She is here today for an annual surveillance visit.     History:     Her past gynecologic history is notable for cervical cancer diagnosed in  for which she underwent chemoradiation therapy completing this 2000 and has been without recurrence since her initial diagnosis. She underwent a laparotomy for lymph node dissection, which were positive.     She began to experience some lower extremity edema although does not clearly stated as lymphedema and was utilizing compression stockings most recently she noted that there was increasing swelling in the right lower extremity.  As per the course of evaluation lower externally ultrasound was performed which ruled out deep venous thrombosis and pelvic CT noted a 6.3 cm complex left ovarian cyst.  CT scan was performed on August 15 2018,  follow-up pelvic ultrasound performed on  noted.  Uterus at 4.3 cm with endometrial thickness of 0.2 cm with a left complex ovarian cyst with thin septation measuring 4 x 3.7 x 4.3 cm with no associated ascites.         2018  16     10/12/2018 Pelvic US  FINDINGS: The uterus is anteflexed, measuring 4.0 x 1.7 x 3.7 cm in  long axis, short axis and transverse dimensions, respectively. No  myometrial masses. The endometrial stripe measures 0.2 cm in  dual-layer thickness. A trace amount of fluid is  present in the  endometrial cavity, within normal physiologic limits. The right ovary  is not visualized. The left ovary measures 6.2 x 5.5 x 4.6 cm. A 5.3 x  5.1 x 4.0 cm anechoic cyst containing a 1.5 cm internal echogenicity  is present in the left ovary. No adnexal masses. No free fluid in the  pelvis.     11/30/2018  Laparoscopic bilateral salpingoophorectomy, cystoscopy     FINAL DIAGNOSIS:   A: Right ovary and fallopian tube, laparoscopic salpingo-oophorectomy:   - Ovarian serous cystadenofibroma (size 2.3 cm) and surface adhesions   - Fallopian tube with atrophic epithelium and mild hydrosalpinx, suggestive of radiation related changes   - Fallopian tube surface adhesions   - Uninvolved by borderline tumor   - Negative for malignancy     B: Left ovary and fallopian tube, laparoscopic salpingo-oophorectomy (including B1FS):   - Ovarian serous borderline tumor (size 6.5 cm)   - Ovarian surface uninvolved by borderline tumor   - Fallopian tube with atrophic epithelium and mild hydrosalpinx, suggestive of radiation related changes   - Fallopian tube surface adhesions   - Fallopian tube uninvolved by borderline tumor   - Negative for malignancy   - See comment           Today she comes to clinic feeling well and denies any concerns. She denies any vaginal bleeding, no changes in her bowel or bladder habits, no nausea/emesis, no lower extremity edema, and no difficulties eating or sleeping. She denies any abdominal discomfort/bloating, no fevers or chills, and no chest pain or shortness of breath. She is not sexually active and is not using a dilator. She recently had an MR pelvic for her right hip pain and there were no concerning findings in terms of her cancer. She is current with her mammogram and colonoscopy. She has questions from her PCP about her pap screening.           Review of Systems     Constitutional:  Negative for fever, chills, weight loss, weight gain, fatigue, decreased appetite, night sweats,  recent stressors, height gain, height loss, post-operative complications, incisional pain, hallucinations, increased energy, hyperactivity and confused.   HENT:  Negative for ear pain, hearing loss, tinnitus, nosebleeds, trouble swallowing, hoarse voice, mouth sores, sore throat, ear discharge, tooth pain, gum tenderness, taste disturbance, smell disturbance, hearing aid, bleeding gums, dry mouth, sinus pain, sinus congestion and neck mass.    Eyes:  Negative for double vision, pain, redness, eye pain, decreased vision, eye watering, eye bulging, eye dryness, flashing lights, spots, floaters, strabismus, tunnel vision, jaundice and eye irritation.   Respiratory:   Negative for cough, hemoptysis, sputum production, shortness of breath, wheezing, sleep disturbances due to breathing, snores loudly, respiratory pain, dyspnea on exertion, cough disturbing sleep and postural dyspnea.    Cardiovascular:  Negative for chest pain, dyspnea on exertion, palpitations, orthopnea, claudication, leg swelling, fingers/toes turn blue, hypertension, hypotension, syncope, history of heart murmur, chest pain on exertion, chest pain at rest, pacemaker, few scattered varicosities, leg pain, sleep disturbances due to breathing, tachycardia, light-headedness, exercise intolerance and edema.   Gastrointestinal:  Negative for heartburn, nausea, vomiting, abdominal pain, diarrhea, constipation, blood in stool, melena, rectal pain, bloating, hemorrhoids, bowel incontinence, jaundice, rectal bleeding, coffee ground emesis and change in stool.   Genitourinary:  Negative for bladder incontinence, dysuria, urgency, hematuria, flank pain, vaginal discharge, difficulty urinating, genital sores, dyspareunia, decreased libido, nocturia, voiding less frequently, arousal difficulty, abnormal vaginal bleeding, excessive menstruation, menstrual changes, hot flashes, vaginal dryness and postmenopausal bleeding.   Musculoskeletal:  Positive for muscle  cramps. Negative for myalgias, back pain, joint swelling, arthralgias, stiffness, neck pain, bone pain, muscle weakness and fracture.   Skin:  Negative for nail changes, itching, poor wound healing, rash, hair changes, skin changes, acne, warts, poor wound healing, scarring, flaky skin, Raynaud's phenomenon, sensitivity to sunlight and skin thickening.   Neurological:  Negative for dizziness, tingling, tremors, speech change, seizures, loss of consciousness, weakness, light-headedness, numbness, headaches, disturbances in coordination, extremity numbness, memory loss, difficulty walking and paralysis.   Endo/Heme:  Negative for anemia, swollen glands and bruises/bleeds easily.   Psychiatric/Behavioral:  Negative for depression, hallucinations, memory loss, decreased concentration, mood swings and panic attacks.    Breast:  Negative for breast discharge, breast mass, breast pain and nipple retraction.   Endocrine:  Negative for altered temperature regulation, polyphagia, polydipsia, unwanted hair growth and change in facial hair.          Past Medical History:    Past Medical History:   Diagnosis Date     Allergic rhinitis     molds, dust     Atrophic vaginitis      Cervical cancer (H) 4/10/2000    chemo and radiation, IB-II squamous cell, positive nodes     Degenerative joint disease of right hip      Diverticulosis of colon 6/2004     Hypertension goal BP (blood pressure) < 140/90      Liver hemangioma      Lung nodules      Mild persistent asthma      Mild recurrent major depression (H)      Nasal polyps      Overactive bladder      Sensorineural hearing losses      Tinnitus      Varicose vein of leg     No surgery         Past Surgical History:    Past Surgical History:   Procedure Laterality Date     COLONOSCOPY WITH CO2 INSUFFLATION N/A 4/12/2017    Procedure: COLONOSCOPY WITH CO2 INSUFFLATION;  Surgeon: Anatoly Do MD;  Location: [facility] OR     COLPOSCOPY,LOOP ELECTRD CERVIX EXCIS  2000    Radiation &  chemotherapy for cervical cancer. Diagnosed in 2000     CYSTOSCOPY N/A 11/30/2018    Procedure: CYSTOSCOPY;  Surgeon: Genesis Hicks MD;  Location: [facility] OR     HC CORRECT BUNION,SIMPLE      ESTEPHANIE     LAPAROSCOPIC SALPINGO-OOPHORECTOMY Bilateral 11/30/2018    Procedure: Laparoscopic Bilateral Salpingo-Oophorectomy, Pelvic washings, cystoscopy;  Surgeon: Genesis Hicks MD;  Location: [facility] OR     SINUS SURGERY      x 2, Nasal polyps removed         Health Maintenance Due   Topic Date Due     PREVENTIVE CARE VISIT  08/06/2019     HPV  08/06/2019     PAP  08/06/2019       Current Medications:     Current Outpatient Medications   Medication Sig Dispense Refill     albuterol (PROAIR HFA/PROVENTIL HFA/VENTOLIN HFA) 108 (90 Base) MCG/ACT inhaler Inhale 2 puffs into the lungs every 4 hours as needed for shortness of breath / dyspnea 1 Inhaler 4     budesonide-formoterol (SYMBICORT) 80-4.5 MCG/ACT Inhaler USE 2 INHALATIONS TWICE A DAY 30.6 g 3     cetirizine (ZYRTEC) 10 MG tablet Take 1 tablet by mouth daily as needed for allergies.       ipratropium - albuterol 0.5 mg/2.5 mg/3 mL (DUONEB) 0.5-2.5 (3) MG/3ML neb solution Take 1 vial (3 mLs) by nebulization every 6 hours as needed for shortness of breath / dyspnea or wheezing 30 vial 3     MULTI-VITAMIN OR DAILY       Omega-3 Fatty Acids (FISH OIL) 500 MG CAPS Take  by mouth. 1 daily         tolterodine ER (DETROL LA) 4 MG 24 hr capsule Take 1 capsule (4 mg) by mouth daily 90 capsule 3     Triamcinolone Acetonide (NASACORT ALLERGY 24HR NA)            Allergies:        Allergies   Allergen Reactions     Citalopram      Thinking changes     Flonase [Fluticasone Propionate]      Sulfa Drugs Rash     Sulfasalazine Rash        Social History:     Social History     Tobacco Use     Smoking status: Never Smoker     Smokeless tobacco: Never Used   Substance Use Topics     Alcohol use: Yes     Alcohol/week: 1.7 standard drinks     Comment: 1-2 a month       History   Drug Use No          Family History:       Family History   Problem Relation Age of Onset     Cancer Maternal Grandmother         pelvic     Ovarian Cancer Maternal Grandmother      Neurologic Disorder Sister         brain aneurysm     Hypertension Father      Asthma Father      Pacemaker Father      Diabetes Maternal Grandfather      Allergies Brother      Coronary Artery Disease Paternal Grandmother      Cerebrovascular Disease Sister      C.A.D. No family hx of          Physical Exam:     BP (!) 148/90   Pulse 80   Temp 97.5  F (36.4  C) (Oral)   Resp 16   Wt 57.6 kg (127 lb)   LMP  (LMP Unknown)   SpO2 93%   Breastfeeding No   BMI 19.89 kg/m     Body mass index is 19.89 kg/m .    General Appearance: healthy and alert, no distress     HEENT: no thyromegaly, no palpable nodules or masses        Cardiovascular: regular rate and rhythm, no gallops, rubs or murmurs     Respiratory: lungs clear, no rales, rhonchi or wheezes, normal diaphragmatic excursion    Musculoskeletal: extremities non tender and without edema    Skin: no lesions or rashes     Neurological: normal gait, no gross defects     Psychiatric: appropriate mood and affect                               Hematological: normal cervical, supraclavicular and inguinal lymph nodes     Gastrointestinal:       abdomen soft, non-tender, non-distended, no organomegaly or masses    Genitourinary: External genitalia and urethral meatus appears normal.  Vagina is smooth without nodularity or masses; foreshortened.  Cervix not visualized; obliterated by radiation. Bimanual exam reveal no masses, nodularity or fullness.  Recto-vaginal exam confirms these findings..Pap collected.       Assessment:    Wendy Francis is a 64 year old woman with a diagnosis of Early stage serous borderline ovarian tumor.  She is here today for an annual surveillance visit.     25 minutes were spent with this patient, over 50% of that time was spent in symptom management, treatment planning and  in counseling and coordination of care.      Plan:     1.)        Continue with annual pelvic/rectal exam which can be done here or with her local provider when she is also getting her cervical cancer follow up. Discussed given that her  was not elevated prior to surgery we will not follow this. Reviewed signs and symptoms for when she should contact the clinic or seek additional care. Regarding her history of cervical cancer from 2000, recommend to continue to have an annual pap (no HPV) with her PCP. Reviewed recommendations from [facility] against performing colposcopy in patients treated for cervical cancer with Pap tests of LSIL or less. Colposcopy for LSIL in this group does not detect recurrence unless there is a visible lesion. Collected a pap on her today as she was overdue for this. Answered all of her questions to the best of my ability.      2.) Genetic risk factors were assessed and the patient does not meet the qualifications for a referral.      3.) Labs and/or tests ordered include:  Pap.      4.) Health maintenance issues addressed today include annual health maintenance and non-gynecologic issues with PCP. Encouraged to monitor her BP at outside locations and follow up with her PCP if readings remain elevated.     CAMILO Moura, WHNP-BC, ANP-BC  Women's Health Nurse Practitioner  Adult Nurse Pracitioner  Division of Gynecologic Oncology      CC  Patient Care Team:  Rosanna Lenz MD as PCP - General (Family Practice)

## 2019-12-11 ENCOUNTER — OFFICE VISIT (OUTPATIENT)
Dept: OPHTHALMOLOGY | Facility: CLINIC | Age: 64
End: 2019-12-11
Payer: COMMERCIAL

## 2019-12-11 ENCOUNTER — OFFICE VISIT (OUTPATIENT)
Dept: ORTHOPEDICS | Facility: CLINIC | Age: 64
End: 2019-12-11
Payer: COMMERCIAL

## 2019-12-11 VITALS
DIASTOLIC BLOOD PRESSURE: 92 MMHG | WEIGHT: 126 LBS | SYSTOLIC BLOOD PRESSURE: 142 MMHG | OXYGEN SATURATION: 96 % | HEART RATE: 98 BPM | HEIGHT: 67 IN | BODY MASS INDEX: 19.78 KG/M2

## 2019-12-11 DIAGNOSIS — H40.003 GLAUCOMA SUSPECT OF BOTH EYES: Primary | ICD-10-CM

## 2019-12-11 DIAGNOSIS — M16.11 PRIMARY OSTEOARTHRITIS OF RIGHT HIP: Primary | ICD-10-CM

## 2019-12-11 PROCEDURE — 99243 OFF/OP CNSLTJ NEW/EST LOW 30: CPT | Performed by: ORTHOPAEDIC SURGERY

## 2019-12-11 PROCEDURE — 76514 ECHO EXAM OF EYE THICKNESS: CPT | Performed by: STUDENT IN AN ORGANIZED HEALTH CARE EDUCATION/TRAINING PROGRAM

## 2019-12-11 PROCEDURE — 92002 INTRM OPH EXAM NEW PATIENT: CPT | Performed by: STUDENT IN AN ORGANIZED HEALTH CARE EDUCATION/TRAINING PROGRAM

## 2019-12-11 PROCEDURE — 92083 EXTENDED VISUAL FIELD XM: CPT | Performed by: STUDENT IN AN ORGANIZED HEALTH CARE EDUCATION/TRAINING PROGRAM

## 2019-12-11 PROCEDURE — 92133 CPTRZD OPH DX IMG PST SGM ON: CPT | Performed by: STUDENT IN AN ORGANIZED HEALTH CARE EDUCATION/TRAINING PROGRAM

## 2019-12-11 ASSESSMENT — VISUAL ACUITY
CORRECTION_TYPE: GLASSES
METHOD: SNELLEN - LINEAR
OS_CC: 20/20
OD_CC: 20/25

## 2019-12-11 ASSESSMENT — TONOMETRY
IOP_METHOD: APPLANATION
OD_IOP_MMHG: 13
OS_IOP_MMHG: 15

## 2019-12-11 ASSESSMENT — REFRACTION_WEARINGRX
OS_CYLINDER: +1.00
OS_SPHERE: PLANO
OD_CYLINDER: +1.00
OD_ADD: +2.25
OD_SPHERE: -0.25
OD_AXIS: 003
OS_ADD: +2.25
OS_AXIS: 180

## 2019-12-11 ASSESSMENT — PACHYMETRY
OD_CT(UM): 530
OS_CT(UM): 532

## 2019-12-11 ASSESSMENT — CUP TO DISC RATIO
OD_RATIO: 0.35 UD
OS_RATIO: 0.4 UD

## 2019-12-11 ASSESSMENT — SLIT LAMP EXAM - LIDS
COMMENTS: NORMAL
COMMENTS: NORMAL

## 2019-12-11 ASSESSMENT — EXTERNAL EXAM - LEFT EYE: OS_EXAM: NORMAL

## 2019-12-11 ASSESSMENT — MIFFLIN-ST. JEOR: SCORE: 1159.16

## 2019-12-11 ASSESSMENT — EXTERNAL EXAM - RIGHT EYE: OD_EXAM: NORMAL

## 2019-12-11 NOTE — LETTER
12/11/2019         RE: Wendy Francis  [address] [place] [id number]        Dear Colleague,    Thank you for referring your patient, Wendy Francis, to the [facility]. Please see a copy of my visit note below.     Current Eye Medications:  none     Subjective:  Glaucoma evaluation, Was seen at [facility] for complete last month. Patient was told optic nerve was abnormal right eye and needed checked. Vision is OK both eyes. No eye pain or discomfort in either eye. Mother had glaucoma.     Objective:  See Ophthalmology Exam.       Assessment:  Wendy Francis is a 64 year old female who presents with:   Encounter Diagnosis   Name Primary?     Glaucoma suspect of both eyes FH: mother with glaucoma    Blakely visual field (HVF): within normal limits, possible early inf nasal step left eye    OCT optic nerve: avg 82/80; within normal limits right eye, borderline temp left eye.     Intraocular pressure 13/15 with avg central corneal thickness (530/532).    Monitor.        Plan:  Continue monitoring for glaucoma suspicion    Renetta Travis MD  [phone]        Again, thank you for allowing me to participate in the care of your patient.        Sincerely,        Renetta Travis MD

## 2019-12-11 NOTE — PROGRESS NOTES
SUBJECTIVE:   Wendy Francis is a 64 year old female who is seen in consultation at the request of Dr. Mala MD for evaluation of right hip pain that has been present for over a year. No known injury. Over the past several months, her pains have become worse. The pain is in the buttock and groin area. The pain radiates to her thigh. She describes the pain as deep and achy, but not sharp and shooting. The pain is on and off while walking. She has to modify the way she walks in order to help limit the pain. There is some pain while sitting and standing. The pain is affecting her daily activities such as going up and down the stairs. She has no pain while laying down, but movement causes her pain. She reports catching, but denies locking and crunching in the hip. She denies back pain. She denies any numbness and tingling in the foot, or radicular-type pains. She also has trouble crossing her legs to adjust her right shoes and socks.     Treatments tried to this point: none    Review of Systems:  Constitutional:  NEGATIVE for fever, chills, change in weight  Integumentary/Skin:  NEGATIVE for worrisome rashes, moles or lesions  Eyes:  NEGATIVE for vision changes or irritation  ENT/Mouth:  NEGATIVE for ear, mouth and throat problems  Resp:  NEGATIVE for significant cough or SOB  Breast:  NEGATIVE for masses, tenderness or discharge  CV:  NEGATIVE for chest pain, palpitations or peripheral edema  GI:  NEGATIVE for nausea, abdominal pain, heartburn, or change in bowel habits  :  Negative   Musculoskeletal:  See HPI above  Neuro:  NEGATIVE for weakness, dizziness or paresthesias  Endocrine:  NEGATIVE for temperature intolerance, skin/hair changes  Heme/allergy/immune:  NEGATIVE for bleeding problems  Psychiatric:  NEGATIVE for changes in mood or affect    Past Medical History:   Past Medical History:   Diagnosis Date     Allergic rhinitis     molds, dust     Atrophic vaginitis      Cervical cancer (H) 4/10/2000     chemo and radiation, IB-II squamous cell, positive nodes     Degenerative joint disease of right hip      Degenerative joint disease of right hip      Diverticulosis of colon 6/2004     Hypertension goal BP (blood pressure) < 140/90      Liver hemangioma      Lung nodules      Mild persistent asthma      Mild recurrent major depression (H)      Nasal polyps      Overactive bladder      Sensorineural hearing losses      Tinnitus      Varicose vein of leg     No surgery     Past Surgical History:   Past Surgical History:   Procedure Laterality Date     COLONOSCOPY WITH CO2 INSUFFLATION N/A 4/12/2017    Procedure: COLONOSCOPY WITH CO2 INSUFFLATION;  Surgeon: Anatoly Do MD;  Location:  OR     COLPOSCOPY,LOOP ELECTRD CERVIX EXCIS  2000    Radiation & chemotherapy for cervical cancer. Diagnosed in 2000     CYSTOSCOPY N/A 11/30/2018    Procedure: CYSTOSCOPY;  Surgeon: Genesis Hicks MD;  Location: [facility] OR      CORRECT BUNION,SIMPLE      ESTEPHANIE     LAPAROSCOPIC SALPINGO-OOPHORECTOMY Bilateral 11/30/2018    Procedure: Laparoscopic Bilateral Salpingo-Oophorectomy, Pelvic washings, cystoscopy;  Surgeon: Genesis Hicks MD;  Location: [facility] OR     SINUS SURGERY      x 2, Nasal polyps removed     Family History:   Family History   Problem Relation Age of Onset     Cancer Maternal Grandmother         pelvic     Ovarian Cancer Maternal Grandmother      Neurologic Disorder Sister         brain aneurysm     Hypertension Father      Asthma Father      Pacemaker Father      Diabetes Maternal Grandfather      Allergies Brother      Coronary Artery Disease Paternal Grandmother      Cerebrovascular Disease Sister      C.A.D. No family hx of      Social History:   Social History     Tobacco Use     Smoking status: Never Smoker     Smokeless tobacco: Never Used   Substance Use Topics     Alcohol use: Yes     Alcohol/week: 1.7 standard drinks     Comment: 1-2 a month     This document serves as a record of the services and  "decisions personally performed and made by JOAQUIN Jacobsen MD. It was created on his behalf by Travis Alicea, a trained medical scribe. The creation of this document is based the provider's statements to the medical scribe.    Scribpilar Mark Said 2:31 PM 12/11/2019       OBJECTIVE:  Physical Exam:  BP (!) 142/92   Pulse 98   Ht 1.702 m (5' 7\")   Wt 57.2 kg (126 lb)   LMP  (LMP Unknown)   SpO2 96%   BMI 19.73 kg/m    General Appearance: healthy, alert and no distress   Skin: no suspicious lesions or rashes  Neuro: Normal strength and tone, mentation intact and speech normal  Vascular: good pulses, and cappillary refill   Lymph: no lymphadenopathy   Psych:  mentation appears normal and affect normal/bright  Resp: no increased work of breathing     Right Hip Exam:  Lumbar range of motion: limitations of lumbar extension and does reproduce her anterior thigh pain   Forward Flexion: to touch the floor   Side bending: Good lateral tilt with some hip pain bilaterally   Seated SLR: negative  Supine SLR: negative    Gait: Walks with antalgic gait, favoring right side  Tender: sciatic notch on the right, without radiating down to the leg  Hip range of motion:    Flexion: Full, with slight flexion contracture   Internal Rotation: 15* degrees   External Rotation: 30* degrees  Strength: abduction: good  Flexion: Full  Hip Flexors: Weakness     X-rays:  Radiographs from 11/20/2019 of right hip done at [facility], with poor reproductions available, were reviewed with the patient, and demonstrate:  1. Advanced degenerative joint disease involving the right hip. A subchondral cyst is likely present in the right acetabulum. An osteolytic process in the acetabulum, such as clear cell chondrosarcoma is felt to be less likely, but cannot be definitively excluded. Considering the degree of degenerative joint disease and constellation of findings, consider MRI of the pelvis, with coronal and sagittal slices of the right hip for " further evaluation.  2. Question of diminished bone density  3. Moderate/advanced degenerative facet and disc disease in the lower lumbar spine.  4. Surgical clips in the region of the iliac arteries/lymph nodes.    MRI:  From 12/7/2019  of the right hip showed:  1. Advanced osteoarthritis of the right hip joint with mild-moderate reactive bone marrow edema and a large subchondral cyst in the superior acetabulum. There is also a large joint effusion with a small intra-articular body.  2. No evidence of a more aggressive/neoplastic process. No lymphadenopathy.   3. Mild gluteus medius & minimus insertional tendinopathy, without tendon tearing.  4. Grade 1 strain of the proximal adductor musculature near the pubic bone.  5. Degeneration and poorly defined fraying/tearing of the anterior through posterosuperior aspects of the labrum. No paralabral cyst.   6. No fracture or osseous stress reaction.      ASSESSMENT:   Encounter Diagnosis   Name Primary?     Primary osteoarthritis of right hip Yes     PLAN:   We talked about the fact that she will likely need a hip replacement in the near future. This is the first time she's heard of it and is not interested at this time.  I suggested a steroid injection.      Image guided right hip steroid injection was ordered today.     We briefly discussed total Hip Arthroplasty: We talked about the patient's condition and diagnosis.  I have advised her to think about her discussion today regarding hip replacement down the road.     Return to clinic: AS NEEDED.  When she returns, we would get new AP and lateral right hip x-rays as well as an AP pelvis.    The information in this document, created by a scribe for me, accurately reflects the services I personally performed and the decisions made by me. I have reviewed and approved this document for accuracy.     JOAQUIN Jacobsen MD  Dept. Orthopedic Surgery  [facility]

## 2019-12-11 NOTE — PROGRESS NOTES
Current Eye Medications:  none     Subjective:  Glaucoma evaluation, Was seen at [facility] for complete last month. Patient was told optic nerve was abnormal right eye and needed checked. Vision is OK both eyes. No eye pain or discomfort in either eye. Mother had glaucoma.     Objective:  See Ophthalmology Exam.       Assessment:  Wendy Francis is a 64 year old female who presents with:   Encounter Diagnosis   Name Primary?     Glaucoma suspect of both eyes FH: mother with glaucoma    Blakely visual field (HVF): within normal limits, possible early inf nasal step left eye    OCT optic nerve: avg 82/80; within normal limits right eye, borderline temp left eye.     Intraocular pressure 13/15 with avg central corneal thickness (530/532).    Monitor.        Plan:  Continue monitoring for glaucoma suspicion    Renetta Travis MD  [phone]

## 2019-12-11 NOTE — LETTER
12/11/2019         RE: Wendy Francis  [address] [place] [id number]        Dear Colleague,    Thank you for referring your patient, Wendy Francis, to the [facility]. Please see a copy of my visit note below.    SUBJECTIVE:   Wendy Francis is a 64 year old female who is seen in consultation at the request of Dr. Mala MD for evaluation of right hip pain that has been present for over a year. No known injury. Over the past several months, her pains have become worse. The pain is in the buttock and groin area. The pain radiates to her thigh. She describes the pain as deep and achy, but not sharp and shooting. The pain is on and off while walking. She has to modify the way she walks in order to help limit the pain. There is some pain while sitting and standing. The pain is affecting her daily activities such as going up and down the stairs. She has no pain while laying down, but movement causes her pain. She reports catching, but denies locking and crunching in the hip. She denies back pain. She denies any numbness and tingling in the foot, or radicular-type pains. She also has trouble crossing her legs to adjust her right shoes and socks.     Treatments tried to this point: none    Review of Systems:  Constitutional:  NEGATIVE for fever, chills, change in weight  Integumentary/Skin:  NEGATIVE for worrisome rashes, moles or lesions  Eyes:  NEGATIVE for vision changes or irritation  ENT/Mouth:  NEGATIVE for ear, mouth and throat problems  Resp:  NEGATIVE for significant cough or SOB  Breast:  NEGATIVE for masses, tenderness or discharge  CV:  NEGATIVE for chest pain, palpitations or peripheral edema  GI:  NEGATIVE for nausea, abdominal pain, heartburn, or change in bowel habits  :  Negative   Musculoskeletal:  See HPI above  Neuro:  NEGATIVE for weakness, dizziness or paresthesias  Endocrine:  NEGATIVE for temperature intolerance, skin/hair changes  Heme/allergy/immune:   NEGATIVE for bleeding problems  Psychiatric:  NEGATIVE for changes in mood or affect    Past Medical History:   Past Medical History:   Diagnosis Date     Allergic rhinitis     molds, dust     Atrophic vaginitis      Cervical cancer (H) 4/10/2000    chemo and radiation, IB-II squamous cell, positive nodes     Degenerative joint disease of right hip      Degenerative joint disease of right hip      Diverticulosis of colon 6/2004     Hypertension goal BP (blood pressure) < 140/90      Liver hemangioma      Lung nodules      Mild persistent asthma      Mild recurrent major depression (H)      Nasal polyps      Overactive bladder      Sensorineural hearing losses      Tinnitus      Varicose vein of leg     No surgery     Past Surgical History:   Past Surgical History:   Procedure Laterality Date     COLONOSCOPY WITH CO2 INSUFFLATION N/A 4/12/2017    Procedure: COLONOSCOPY WITH CO2 INSUFFLATION;  Surgeon: Anatoly Do MD;  Location:  OR     COLPOSCOPY,LOOP ELECTRD CERVIX EXCIS  2000    Radiation & chemotherapy for cervical cancer. Diagnosed in 2000     CYSTOSCOPY N/A 11/30/2018    Procedure: CYSTOSCOPY;  Surgeon: Genesis Hicks MD;  Location: [facility] OR      CORRECT BUNION,SIMPLE      ESTEPHANIE     LAPAROSCOPIC SALPINGO-OOPHORECTOMY Bilateral 11/30/2018    Procedure: Laparoscopic Bilateral Salpingo-Oophorectomy, Pelvic washings, cystoscopy;  Surgeon: Genesis Hicks MD;  Location: [facility] OR     SINUS SURGERY      x 2, Nasal polyps removed     Family History:   Family History   Problem Relation Age of Onset     Cancer Maternal Grandmother         pelvic     Ovarian Cancer Maternal Grandmother      Neurologic Disorder Sister         brain aneurysm     Hypertension Father      Asthma Father      Pacemaker Father      Diabetes Maternal Grandfather      Allergies Brother      Coronary Artery Disease Paternal Grandmother      Cerebrovascular Disease Sister      C.A.D. No family hx of      Social History:   Social History  "    Tobacco Use     Smoking status: Never Smoker     Smokeless tobacco: Never Used   Substance Use Topics     Alcohol use: Yes     Alcohol/week: 1.7 standard drinks     Comment: 1-2 a month     This document serves as a record of the services and decisions personally performed and made by JOAQUIN Jacobsen MD. It was created on his behalf by Travis Alicea, a trained medical scribe. The creation of this document is based the provider's statements to the medical scribe.    Scribe Travis Alicea 2:31 PM 12/11/2019       OBJECTIVE:  Physical Exam:  BP (!) 142/92   Pulse 98   Ht 1.702 m (5' 7\")   Wt 57.2 kg (126 lb)   LMP  (LMP Unknown)   SpO2 96%   BMI 19.73 kg/m     General Appearance: healthy, alert and no distress   Skin: no suspicious lesions or rashes  Neuro: Normal strength and tone, mentation intact and speech normal  Vascular: good pulses, and cappillary refill   Lymph: no lymphadenopathy   Psych:  mentation appears normal and affect normal/bright  Resp: no increased work of breathing     Right Hip Exam:  Lumbar range of motion: limitations of lumbar extension and does reproduce her anterior thigh pain   Forward Flexion: to touch the floor   Side bending: Good lateral tilt with some hip pain bilaterally   Seated SLR: negative  Supine SLR: negative    Gait: Walks with antalgic gait, favoring right side  Tender: sciatic notch on the right, without radiating down to the leg  Hip range of motion:    Flexion: Full, with slight flexion contracture   Internal Rotation: 15* degrees   External Rotation: 30* degrees  Strength: abduction: good  Flexion: Full  Hip Flexors: Weakness     X-rays:  Radiographs from 11/20/2019 of right hip done at [facility], with poor reproductions available, were reviewed with the patient, and demonstrate:  1. Advanced degenerative joint disease involving the right hip. A subchondral cyst is likely present in the right acetabulum. An osteolytic process in the acetabulum, such as clear cell " chondrosarcoma is felt to be less likely, but cannot be definitively excluded. Considering the degree of degenerative joint disease and constellation of findings, consider MRI of the pelvis, with coronal and sagittal slices of the right hip for further evaluation.  2. Question of diminished bone density  3. Moderate/advanced degenerative facet and disc disease in the lower lumbar spine.  4. Surgical clips in the region of the iliac arteries/lymph nodes.    MRI:  From 12/7/2019  of the right hip showed:  1. Advanced osteoarthritis of the right hip joint with mild-moderate reactive bone marrow edema and a large subchondral cyst in the superior acetabulum. There is also a large joint effusion with a small intra-articular body.  2. No evidence of a more aggressive/neoplastic process. No lymphadenopathy.   3. Mild gluteus medius & minimus insertional tendinopathy, without tendon tearing.  4. Grade 1 strain of the proximal adductor musculature near the pubic bone.  5. Degeneration and poorly defined fraying/tearing of the anterior through posterosuperior aspects of the labrum. No paralabral cyst.   6. No fracture or osseous stress reaction.      ASSESSMENT:   Encounter Diagnosis   Name Primary?     Primary osteoarthritis of right hip Yes     PLAN:   We talked about the fact that she will likely need a hip replacement in the near future. This is the first time she's heard of it and is not interested at this time.  I suggested a steroid injection.      Image guided right hip steroid injection was ordered today.     We briefly discussed total Hip Arthroplasty: We talked about the patient's condition and diagnosis.  I have advised her to think about her discussion today regarding hip replacement down the road.     Return to clinic: AS NEEDED.  When she returns, we would get new AP and lateral right hip x-rays as well as an AP pelvis.    The information in this document, created by a scribe for me, accurately reflects the  services I personally performed and the decisions made by me. I have reviewed and approved this document for accuracy.     JOAQUIN Jacobsen MD  Dept. Orthopedic Surgery  [facility]     Again, thank you for allowing me to participate in the care of your patient.        Sincerely,        Jamey Jacobsen MD

## 2019-12-13 LAB
COPATH REPORT: NORMAL
PAP: NORMAL

## 2019-12-19 ENCOUNTER — OFFICE VISIT (OUTPATIENT)
Dept: ORTHOPEDICS | Facility: CLINIC | Age: 64
End: 2019-12-19
Payer: COMMERCIAL

## 2019-12-19 VITALS — BODY MASS INDEX: 19.78 KG/M2 | WEIGHT: 126 LBS | HEIGHT: 67 IN

## 2019-12-19 DIAGNOSIS — M16.11 PRIMARY OSTEOARTHRITIS OF RIGHT HIP: Primary | ICD-10-CM

## 2019-12-19 PROCEDURE — 20611 DRAIN/INJ JOINT/BURSA W/US: CPT | Mod: RT | Performed by: FAMILY MEDICINE

## 2019-12-19 RX ORDER — BETAMETHASONE SODIUM PHOSPHATE AND BETAMETHASONE ACETATE 3; 3 MG/ML; MG/ML
6 INJECTION, SUSPENSION INTRA-ARTICULAR; INTRALESIONAL; INTRAMUSCULAR; SOFT TISSUE
Status: DISCONTINUED | OUTPATIENT
Start: 2019-12-19 | End: 2020-06-29

## 2019-12-19 RX ORDER — ROPIVACAINE HYDROCHLORIDE 5 MG/ML
2 INJECTION, SOLUTION EPIDURAL; INFILTRATION; PERINEURAL
Status: DISCONTINUED | OUTPATIENT
Start: 2019-12-19 | End: 2020-06-29

## 2019-12-19 RX ADMIN — ROPIVACAINE HYDROCHLORIDE 2 ML: 5 INJECTION, SOLUTION EPIDURAL; INFILTRATION; PERINEURAL at 11:35

## 2019-12-19 RX ADMIN — BETAMETHASONE SODIUM PHOSPHATE AND BETAMETHASONE ACETATE 6 MG: 3; 3 INJECTION, SUSPENSION INTRA-ARTICULAR; INTRALESIONAL; INTRAMUSCULAR; SOFT TISSUE at 11:35

## 2019-12-19 ASSESSMENT — MIFFLIN-ST. JEOR: SCORE: 1159.16

## 2019-12-19 NOTE — PROGRESS NOTES
Large Joint Injection/Arthocentesis: R hip joint  Date/Time: 12/19/2019 11:35 AM  Performed by: Marquis Herrera MD  Authorized by: Marquis Herrera MD     Indications:  Pain and osteoarthritis  Needle Size:  22 G  Guidance: ultrasound    Approach:  Anterior  Location:  Hip      Site:  R hip joint  Medications:  6 mg betamethasone acet & sod phos 6 (3-3) MG/ML; 2 mL ropivacaine 5 MG/ML  Outcome:  Tolerated well, no immediate complications  Procedure discussed: discussed risks, benefits, and alternatives    Consent Given by:  Patient  Timeout: timeout called immediately prior to procedure    Prep: patient was prepped and draped in usual sterile fashion     Patient reported significant improvement of pain after right hip steroid injection.  Aftercare instructions given to patient.  Plan to follow-up as previously discussed with referring provider.     Marquis Herrera MD [facility]

## 2019-12-19 NOTE — LETTER
12/19/2019         RE: Wendy Francis  [address] [place] [id number]        Dear Colleague,    Thank you for referring your patient, Wendy Francis, to the [facility]. Please see a copy of my visit note below.    Large Joint Injection/Arthocentesis: R hip joint  Date/Time: 12/19/2019 11:35 AM  Performed by: Marquis Herrera MD  Authorized by: Marquis Herrera MD     Indications:  Pain and osteoarthritis  Needle Size:  22 G  Guidance: ultrasound    Approach:  Anterior  Location:  Hip      Site:  R hip joint  Medications:  6 mg betamethasone acet & sod phos 6 (3-3) MG/ML; 2 mL ropivacaine 5 MG/ML  Outcome:  Tolerated well, no immediate complications  Procedure discussed: discussed risks, benefits, and alternatives    Consent Given by:  Patient  Timeout: timeout called immediately prior to procedure    Prep: patient was prepped and draped in usual sterile fashion     Patient reported significant improvement of pain after right hip steroid injection.  Aftercare instructions given to patient.  Plan to follow-up as previously discussed with referring provider.     Marquis Herrera MD [facility]          Again, thank you for allowing me to participate in the care of your patient.        Sincerely,        Marquis Herrera MD

## 2019-12-19 NOTE — PATIENT INSTRUCTIONS
[facility] Injection Discharge Instructions    Procedure: Right hip steroid injection      You may shower, however avoid swimming, tub baths or hot tubs for 24 hours following your procedure    You may have a mild to moderate increase in pain for several days following the injection.    It may take up to 14 days for the steroid medication to start working although you may feel the effect as early as a few days after the procedure.    You may use ice packs for 10-15 minutes, 3 to 4 times a day at the injection site for comfort    You may use anti-inflammatory medications (such as Ibuprofen or Aleve or Advil) or Tylenol for pain control if necessary    If you were fasting, you may resume your normal diet and medications after the procedure    If you have diabetes, check your blood sugar more frequently than usual as your blood sugar may be higher than normal for 10-14 days following a steroid injection. Contact your doctor who manages your diabetes if your blood sugar is higher than usual      If you experience any of the following, call [facility] @ [phone] or [phone]  -Fever over 100 degree F  -Swelling, bleeding, redness, drainage, warmth at the injection site  - New or worsening pain

## 2019-12-26 ENCOUNTER — MYC MEDICAL ADVICE (OUTPATIENT)
Dept: FAMILY MEDICINE | Facility: CLINIC | Age: 64
End: 2019-12-26

## 2019-12-26 DIAGNOSIS — I10 HYPERTENSION GOAL BP (BLOOD PRESSURE) < 140/90: Primary | ICD-10-CM

## 2019-12-26 NOTE — TELEPHONE ENCOUNTER
Dr. Lenz,  Please see [employer] message below.    BP Readings from Last 3 Encounters:   12/11/19 (!) 142/92   12/10/19 (!) 148/90   11/20/19 126/80       Ninfa Lakhani RN  [facility]

## 2019-12-26 NOTE — TELEPHONE ENCOUNTER
Replied to pt via [employer]. Await response.    Ninfa Lakhani RN  [facility]

## 2019-12-27 RX ORDER — AMLODIPINE BESYLATE 5 MG/1
5 TABLET ORAL DAILY
Qty: 90 TABLET | Refills: 0 | Status: SHIPPED | OUTPATIENT
Start: 2019-12-27 | End: 2020-01-27 | Stop reason: DRUGHIGH

## 2020-01-02 ENCOUNTER — ALLIED HEALTH/NURSE VISIT (OUTPATIENT)
Dept: FAMILY MEDICINE | Facility: CLINIC | Age: 65
End: 2020-01-02
Payer: COMMERCIAL

## 2020-01-02 VITALS — DIASTOLIC BLOOD PRESSURE: 96 MMHG | SYSTOLIC BLOOD PRESSURE: 144 MMHG

## 2020-01-02 DIAGNOSIS — Z01.30 BP CHECK: Primary | ICD-10-CM

## 2020-01-02 PROCEDURE — 99207 ZZC NO CHARGE NURSE ONLY: CPT | Performed by: FAMILY MEDICINE

## 2020-01-02 NOTE — PROGRESS NOTES
Wendy Francis was evaluated at [facility] on January 2, 2020 at which time her blood pressure was:    BP Readings from Last 3 Encounters:   01/02/20 (!) 144/96   12/11/19 (!) 142/92   12/10/19 (!) 148/90     Pulse Readings from Last 3 Encounters:   12/11/19 98   12/10/19 80   11/20/19 80       Reviewed lifestyle modifications for blood pressure control and reduction: including making healthy food choices, managing weight, getting regular exercise, smoking cessation, reducing alcohol consumption, monitoring blood pressure regularly.     Symptoms: None    BP Goal:< 140/90 mmHg    BP Assessment:  BP too high    Potential Reasons for BP too high: Dose of BP medication too low    BP Follow-Up Plan: Recheck BP in 30 days at pharmacy    Recommendation to Provider: Patient was prescribed amlodipine 12/27/19, she has not started taking it yet, waiting for mail order pharmacy. Advised patient to return to pharmacy 1 week after starting medication for blood pressure check    Note completed by: Jessica Hackett Rph  [facility]  [address] [place] [address]  WENDI Richards [address]  [phone]

## 2020-01-02 NOTE — Clinical Note
Routing message to PCP for review because BP checked at pharmacy is above goal. Recommended patient to follow-up with PCP.  PCP please close this encounter.144/96 Patient has not started her new medication-amlodipine, she is waiting for it to arrive from mail order pharmacy. Advised patient to return for recheck 1 week after starting.Jessica Hackett [facility] [id number] [place] [address] [place] Watson [phone]

## 2020-01-15 ENCOUNTER — ALLIED HEALTH/NURSE VISIT (OUTPATIENT)
Dept: FAMILY MEDICINE | Facility: CLINIC | Age: 65
End: 2020-01-15
Payer: COMMERCIAL

## 2020-01-15 VITALS — DIASTOLIC BLOOD PRESSURE: 88 MMHG | SYSTOLIC BLOOD PRESSURE: 132 MMHG

## 2020-01-15 DIAGNOSIS — Z01.30 BP CHECK: Primary | ICD-10-CM

## 2020-01-15 PROCEDURE — 99207 ZZC NO CHARGE NURSE ONLY: CPT | Performed by: FAMILY MEDICINE

## 2020-01-16 NOTE — PROGRESS NOTES
Wendy Francis was evaluated at [facility] on January 15, 2020 at which time her blood pressure was:    BP Readings from Last 3 Encounters:   01/15/20 132/88   01/02/20 (!) 144/96   12/11/19 (!) 142/92     Pulse Readings from Last 3 Encounters:   12/11/19 98   12/10/19 80   11/20/19 80       Reviewed lifestyle modifications for blood pressure control and reduction: including making healthy food choices, managing weight, getting regular exercise, smoking cessation, reducing alcohol consumption, monitoring blood pressure regularly.     Symptoms: None    BP Goal:< 140/90 mmHg    BP Assessment:  BP at goal    Potential Reasons for BP too high: NA - Not applicable    BP Follow-Up Plan: Recheck BP in 6 months at pharmacy    Recommendation to Provider:     Note completed by: Thank You,  Elizabeth Capone, Pharm.D.  [facility]

## 2020-01-27 ENCOUNTER — ALLIED HEALTH/NURSE VISIT (OUTPATIENT)
Dept: FAMILY MEDICINE | Facility: CLINIC | Age: 65
End: 2020-01-27
Payer: COMMERCIAL

## 2020-01-27 ENCOUNTER — TELEPHONE (OUTPATIENT)
Dept: FAMILY MEDICINE | Facility: CLINIC | Age: 65
End: 2020-01-27

## 2020-01-27 VITALS — DIASTOLIC BLOOD PRESSURE: 94 MMHG | SYSTOLIC BLOOD PRESSURE: 136 MMHG

## 2020-01-27 DIAGNOSIS — I10 HYPERTENSION GOAL BP (BLOOD PRESSURE) < 140/90: Primary | ICD-10-CM

## 2020-01-27 DIAGNOSIS — Z01.30 BP CHECK: Primary | ICD-10-CM

## 2020-01-27 PROCEDURE — 99207 ZZC NO CHARGE NURSE ONLY: CPT | Performed by: FAMILY MEDICINE

## 2020-01-27 RX ORDER — AMLODIPINE BESYLATE 10 MG/1
10 TABLET ORAL DAILY
Qty: 90 TABLET | Refills: 0 | Status: SHIPPED | OUTPATIENT
Start: 2020-01-27 | End: 2020-04-13

## 2020-01-27 NOTE — PROGRESS NOTES
Wendy Francis was evaluated at [facility] on January 27, 2020 at which time her blood pressure was:    BP Readings from Last 3 Encounters:   01/27/20 (!) 136/94   01/15/20 132/88   01/02/20 (!) 144/96     Pulse Readings from Last 3 Encounters:   12/11/19 98   12/10/19 80   11/20/19 80       Reviewed lifestyle modifications for blood pressure control and reduction: including making healthy food choices, managing weight, getting regular exercise, smoking cessation, reducing alcohol consumption, monitoring blood pressure regularly.     Symptoms: None    BP Goal:< 140/90 mmHg    BP Assessment:  BP too high    Potential Reasons for BP too high: Dose of BP medication too low    BP Follow-Up Plan: Recheck BP in 30 days at pharmacy    Recommendation to Provider: patient reports she will follow up at the pharmacy in 7-10 days    Note completed by: Jessica Hackett Rph  [facility]  [address] [place] [address]  [place] [address]  [phone]

## 2020-01-27 NOTE — Clinical Note
Routing message to PCP for review because BP checked at pharmacy is above goal. Recommended patient to follow-up with PCP.  PCP please close this encounter.136/94 Jessica Hackett [facility] [address] [place] [address] [place] Watson [phone]

## 2020-01-28 NOTE — TELEPHONE ENCOUNTER
Please call patient: your blood pressure is not at goal. I recommend increasing amlodipine to 10 mg daily and follow-up ancillary or pharmacy blood pressure check in 1 to 4 weeks.  Rosanna Lenz MD

## 2020-01-28 NOTE — TELEPHONE ENCOUNTER
Called patient and notified her of provider message as written. Patient verbalized understanding.

## 2020-01-29 ENCOUNTER — TELEPHONE (OUTPATIENT)
Dept: FAMILY MEDICINE | Facility: CLINIC | Age: 65
End: 2020-01-29

## 2020-01-29 DIAGNOSIS — J45.30 MILD PERSISTENT ASTHMA WITHOUT COMPLICATION: ICD-10-CM

## 2020-01-29 DIAGNOSIS — J33.9 NASAL POLYPOSIS: Primary | ICD-10-CM

## 2020-01-29 RX ORDER — BUDESONIDE AND FORMOTEROL FUMARATE DIHYDRATE 80; 4.5 UG/1; UG/1
AEROSOL RESPIRATORY (INHALATION)
Qty: 30.6 G | Refills: 3 | Status: CANCELLED | OUTPATIENT
Start: 2020-01-29

## 2020-01-29 NOTE — TELEPHONE ENCOUNTER
Patient has requested a refill for her compounded Budesonide 2mg/L Nasal Irrigation.  I did not see it on her current med list to re-order.  Patient's current med expires today and she is looking to get a refill ASAP. Let me know if you have any questions.        Thanks      Todd Beltran  Compounding Pharmacy Technician  [facility]   [address] [place] [address]   Phone: [phone]  Fax: [phone]

## 2020-01-29 NOTE — TELEPHONE ENCOUNTER
Patient has sent [facility] messages asking for us to put this in as the [facility] has not sent us anything as of yet.Needs I cannot submit a renewal request for the Budesonide 2MG\L Nasal Irrigation.     Kathy Winn,

## 2020-01-29 NOTE — TELEPHONE ENCOUNTER
Signed Prescriptions:                        Disp   Refills    COMPOUNDED NON-CONTROLLED SUBSTANCE (CMPD *80 mL  11       Sig: Budesonide 2 mg/L irrigation solution. Rinse with 20           mls in each nostril two times a day  Authorizing Provider: CASSANDRA LA

## 2020-01-29 NOTE — TELEPHONE ENCOUNTER
Please see message below.   Budesonide 2mg/L Nasal Irrigation not active on MAR and was not on history of previous meds.     Bailee Manzanares RN

## 2020-02-03 ENCOUNTER — ANCILLARY PROCEDURE (OUTPATIENT)
Dept: CT IMAGING | Facility: CLINIC | Age: 65
End: 2020-02-03
Attending: FAMILY MEDICINE
Payer: COMMERCIAL

## 2020-02-03 DIAGNOSIS — R91.8 LUNG NODULES: ICD-10-CM

## 2020-02-03 PROCEDURE — 71250 CT THORAX DX C-: CPT | Mod: TC

## 2020-02-04 ENCOUNTER — TELEPHONE (OUTPATIENT)
Dept: FAMILY MEDICINE | Facility: CLINIC | Age: 65
End: 2020-02-04

## 2020-02-04 NOTE — RESULT ENCOUNTER NOTE
Please call patient:    Your CT shows a stable left upper lobe lung nodule; follow-up CT in 6 months is recommended.     Signs of infection and mucous plugging were seen; return to clinic if you are having symptoms like cough, fever, chills or shortness of breath.    Rosanna Lenz MD

## 2020-02-04 NOTE — TELEPHONE ENCOUNTER
Patient notified of providers message as written.  Patient verbalized understanding, no further questions or concerns.    Mae Bañuelos RN

## 2020-02-04 NOTE — TELEPHONE ENCOUNTER
Voice mail left for patient to call RN hotline at [phone].      Your CT shows a stable left upper lobe lung nodule; follow-up CT in 6 months is recommended.     Signs of infection and mucous plugging were seen; return to clinic if you are having symptoms like cough, fever, chills or shortness of breath.    Rosanna Bañuelos RN

## 2020-02-10 ENCOUNTER — HEALTH MAINTENANCE LETTER (OUTPATIENT)
Age: 65
End: 2020-02-10

## 2020-02-17 ENCOUNTER — MYC REFILL (OUTPATIENT)
Dept: FAMILY MEDICINE | Facility: CLINIC | Age: 65
End: 2020-02-17

## 2020-02-17 DIAGNOSIS — N32.81 OVERACTIVE BLADDER: ICD-10-CM

## 2020-02-17 RX ORDER — TOLTERODINE 4 MG/1
4 CAPSULE, EXTENDED RELEASE ORAL DAILY
Qty: 90 CAPSULE | Refills: 3 | Status: CANCELLED | OUTPATIENT
Start: 2020-02-17

## 2020-02-17 NOTE — TELEPHONE ENCOUNTER
Duplicate  Disp Refills Start End ANTWON    tolterodine ER (DETROL LA) 4 MG 24 hr capsule 90 capsule 3 11/20/2019  No   Sig - Route: Take 1 capsule (4 mg) by mouth daily - Oral   Sent to pharmacy as: tolterodine ER (DETROL LA) 4 MG 24 hr capsule   Class: E-Prescribe   Notes to Pharmacy: (pt will request refill when due)   Order: [id number]   E-Prescribing Status: Receipt confirmed by pharmacy (11/20/2019  8:30 AM CST)

## 2020-02-18 ENCOUNTER — MYC REFILL (OUTPATIENT)
Dept: FAMILY MEDICINE | Facility: CLINIC | Age: 65
End: 2020-02-18

## 2020-02-18 ENCOUNTER — MYC MEDICAL ADVICE (OUTPATIENT)
Dept: FAMILY MEDICINE | Facility: CLINIC | Age: 65
End: 2020-02-18

## 2020-02-18 DIAGNOSIS — N32.81 OVERACTIVE BLADDER: ICD-10-CM

## 2020-02-19 RX ORDER — TOLTERODINE 4 MG/1
4 CAPSULE, EXTENDED RELEASE ORAL DAILY
Qty: 90 CAPSULE | Refills: 3 | OUTPATIENT
Start: 2020-02-19

## 2020-02-19 RX ORDER — TOLTERODINE 4 MG/1
CAPSULE, EXTENDED RELEASE ORAL
Qty: 30 CAPSULE | Refills: 12 | OUTPATIENT
Start: 2020-02-19

## 2020-02-19 NOTE — TELEPHONE ENCOUNTER
"Declined with note to pharmacy: Rx was sent for year supply 11/20/2019. Please dispense. Call if questions [phone]. E-Prescribing Status: Receipt confirmed by pharmacy (11/20/2019  8:30 AM CST)      Requested Prescriptions   Pending Prescriptions Disp Refills     tolterodine ER 4 MG PO 24 hr capsule 90 capsule 3     Sig: Take 1 capsule (4 mg) by mouth daily       Muscarinic Antagonists (Urinary Incontinence Agents) Passed - 2/18/2020  2:43 PM        Passed - Recent (12 mo) or future (30 days) visit within the authorizing provider's specialty     Patient has had an office visit with the authorizing provider or a provider within the authorizing providers department within the previous 12 mos or has a future within next 30 days. See \"Patient Info\" tab in inbasket, or \"Choose Columns\" in Meds & Orders section of the refill encounter.              Passed - Patient does not have a diagnosis of glaucoma on the problem list     If glaucoma diagnosis is new, refer refill to physician.          Passed - Medication is active on med list        Passed - Patient is 18 years of age or older        "

## 2020-02-19 NOTE — TELEPHONE ENCOUNTER
"Declined with note to pharmacy: Please dispense. Rx was sent electronically 11/20/2019 for a year supply. E-Prescribing Status: Receipt confirmed by pharmacy (11/20/2019  8:30 AM CST)    Requested Prescriptions   Pending Prescriptions Disp Refills     TOLTERODINE ER 4 MG PO 24 hr capsule [Pharmacy Med Name: TOLTERODINE TART ER CAPS 4MG] 30 capsule 12     Sig: TAKE 1 CAPSULE DAILY (OFFICE VISIT NEEDED FOR REFILLS)       Muscarinic Antagonists (Urinary Incontinence Agents) Passed - 2/18/2020  2:28 PM        Passed - Recent (12 mo) or future (30 days) visit within the authorizing provider's specialty     Patient has had an office visit with the authorizing provider or a provider within the authorizing providers department within the previous 12 mos or has a future within next 30 days. See \"Patient Info\" tab in inbasket, or \"Choose Columns\" in Meds & Orders section of the refill encounter.              Passed - Patient does not have a diagnosis of glaucoma on the problem list     If glaucoma diagnosis is new, refer refill to physician.          Passed - Medication is active on med list        Passed - Patient is 18 years of age or older                  "

## 2020-02-24 ENCOUNTER — ALLIED HEALTH/NURSE VISIT (OUTPATIENT)
Dept: FAMILY MEDICINE | Facility: CLINIC | Age: 65
End: 2020-02-24
Payer: COMMERCIAL

## 2020-02-24 VITALS — DIASTOLIC BLOOD PRESSURE: 84 MMHG | SYSTOLIC BLOOD PRESSURE: 130 MMHG

## 2020-02-24 DIAGNOSIS — Z01.30 BP CHECK: Primary | ICD-10-CM

## 2020-02-24 PROCEDURE — 99207 ZZC NO CHARGE NURSE ONLY: CPT | Performed by: FAMILY MEDICINE

## 2020-02-24 NOTE — PROGRESS NOTES
Wendy Francis was evaluated at [facility] on February 24, 2020 at which time her blood pressure was:    BP Readings from Last 3 Encounters:   02/24/20 130/84   01/27/20 (!) 136/94   01/15/20 132/88     Pulse Readings from Last 3 Encounters:   12/11/19 98   12/10/19 80   11/20/19 80       Reviewed lifestyle modifications for blood pressure control and reduction: including making healthy food choices, managing weight, getting regular exercise, smoking cessation, reducing alcohol consumption, monitoring blood pressure regularly.     Symptoms: None    BP Goal:< 140/90 mmHg    BP Assessment:  BP at goal    Potential Reasons for BP too high: NA - Not applicable    BP Follow-Up Plan: Recheck BP in 6 months at pharmacy    Recommendation to Provider: none    Note completed by: Jessica Hackett Rph  [facility]  [address] [place] [address]  [place] [address]  [phone]

## 2020-03-08 DIAGNOSIS — I10 HYPERTENSION GOAL BP (BLOOD PRESSURE) < 140/90: ICD-10-CM

## 2020-03-10 RX ORDER — AMLODIPINE BESYLATE 5 MG/1
TABLET ORAL
Qty: 90 TABLET | Refills: 3 | OUTPATIENT
Start: 2020-03-10

## 2020-03-18 DIAGNOSIS — N32.81 OVERACTIVE BLADDER: ICD-10-CM

## 2020-03-19 ENCOUNTER — TELEPHONE (OUTPATIENT)
Dept: FAMILY MEDICINE | Facility: CLINIC | Age: 65
End: 2020-03-19

## 2020-03-19 DIAGNOSIS — I10 HYPERTENSION GOAL BP (BLOOD PRESSURE) < 140/90: ICD-10-CM

## 2020-03-19 RX ORDER — TOLTERODINE 4 MG/1
4 CAPSULE, EXTENDED RELEASE ORAL DAILY
Qty: 90 CAPSULE | Refills: 1 | Status: SHIPPED | OUTPATIENT
Start: 2020-03-19 | End: 2020-07-28

## 2020-03-19 RX ORDER — AMLODIPINE BESYLATE 10 MG/1
10 TABLET ORAL DAILY
Qty: 90 TABLET | Refills: 0 | Status: CANCELLED | OUTPATIENT
Start: 2020-03-19

## 2020-03-19 NOTE — TELEPHONE ENCOUNTER
Patient is looking for 3 month supply sent to mail order.     Thank you,  Jessi Lynn, PharmD  [facility]  [phone]

## 2020-04-13 DIAGNOSIS — I10 HYPERTENSION GOAL BP (BLOOD PRESSURE) < 140/90: ICD-10-CM

## 2020-04-13 RX ORDER — AMLODIPINE BESYLATE 10 MG/1
10 TABLET ORAL DAILY
Qty: 90 TABLET | Refills: 0 | Status: SHIPPED | OUTPATIENT
Start: 2020-04-13 | End: 2020-05-22

## 2020-04-13 NOTE — TELEPHONE ENCOUNTER
"Routing refill request to provider for review/approval because:  Labs not current: Cr    Requested Prescriptions   Pending Prescriptions Disp Refills     amLODIPine (NORVASC) 10 MG tablet 90 tablet 0     Sig: Take 1 tablet (10 mg) by mouth daily       Calcium Channel Blockers Protocol  Failed - 4/13/2020 11:17 AM        Failed - Normal serum creatinine on file in past 12 months     Recent Labs   Lab Test 11/30/18  0615   CR 0.89       Ok to refill medication if creatinine is low          Passed - Blood pressure under 140/90 in past 12 months     BP Readings from Last 3 Encounters:   02/24/20 130/84   01/27/20 (!) 136/94   01/15/20 132/88                 Passed - Recent (12 mo) or future (30 days) visit within the authorizing provider's specialty     Patient has had an office visit with the authorizing provider or a provider within the authorizing providers department within the previous 12 mos or has a future within next 30 days. See \"Patient Info\" tab in inbasket, or \"Choose Columns\" in Meds & Orders section of the refill encounter.              Passed - Medication is active on med list        Passed - Patient is age 18 or older        Passed - No active pregnancy on record        Passed - No positive pregnancy test in past 12 months           Bailee Manzanares RN  "

## 2020-06-10 NOTE — LETTER
My Depression Action Plan  Name: Wendy Francis   Date of Birth 1955  Date: 5/15/2017    My doctor: Rosanna Lenz   My clinic: [address]  [place] [address]  [phone]          GREEN    ZONE   Good Control    What it looks like:     Things are going generally well. You have normal up s and down s. You may even feel depressed from time to time, but bad moods usually last less than a day.   What you need to do:  1. Continue to care for yourself (see self care plan)  2. Check your depression survival kit and update it as needed  3. Follow your physician s recommendations including any medication.  4. Do not stop taking medication unless you consult with your physician first.           YELLOW         ZONE Getting Worse    What it looks like:     Depression is starting to interfere with your life.     It may be hard to get out of bed; you may be starting to isolate yourself from others.    Symptoms of depression are starting to last most all day and this has happened for several days.     You may have suicidal thoughts but they are not constant.   What you need to do:     1. Call your care team, your response to treatment will improve if you keep your care team informed of your progress. Yellow periods are signs an adjustment may need to be made.     2. Continue your self-care, even if you have to fake it!    3. Talk to someone in your support network    4. Open up your depression survival kit           RED    ZONE Medical Alert - Get Help    What it looks like:     Depression is seriously interfering with your life.     You may experience these or other symptoms: You can t get out of bed most days, can t work or engage in other necessary activities, you have trouble taking care of basic hygiene, or basic responsibilities, thoughts of suicide or death that will not go away, self-injurious behavior.     What you need to do:  1. Call your care team and  request a same-day appointment. If they are not available (weekends or after hours) call your local crisis line, emergency room or 911.      Electronically signed by: Rosanna Lenz, May 15, 2017    Depression Self Care Plan / Survival Kit    Self-Care for Depression  Here s the deal. Your body and mind are really not as separate as most people think.  What you do and think affects how you feel and how you feel influences what you do and think. This means if you do things that people who feel good do, it will help you feel better.  Sometimes this is all it takes.  There is also a place for medication and therapy depending on how severe your depression is, so be sure to consult with your medical provider and/ or Behavioral Health Consultant if your symptoms are worsening or not improving.     In order to better manage my stress, I will:    Exercise  Get some form of exercise, every day. This will help reduce pain and release endorphins, the  feel good  chemicals in your brain. This is almost as good as taking antidepressants!  This is not the same as joining a gym and then never going! (they count on that by the way ) It can be as simple as just going for a walk or doing some gardening, anything that will get you moving.      Hygiene   Maintain good hygiene (Get out of bed in the morning, Make your bed, Brush your teeth, Take a shower, and Get dressed like you were going to work, even if you are unemployed).  If your clothes don't fit try to get ones that do.    Diet  I will strive to eat foods that are good for me, drink plenty of water, and avoid excessive sugar, caffeine, alcohol, and other mood-altering substances.  Some foods that are helpful in depression are: complex carbohydrates, B vitamins, flaxseed, fish or fish oil, fresh fruits and vegetables.    Psychotherapy  I agree to participate in Individual Therapy (if recommended).    Medication  If prescribed medications, I agree to take them.  Missing doses can  result in serious side effects.  I understand that drinking alcohol, or other illicit drug use, may cause potential side effects.  I will not stop my medication abruptly without first discussing it with my provider.    Staying Connected With Others  I will stay in touch with my friends, family members, and my primary care provider/team.    Use your imagination  Be creative.  We all have a creative side; it doesn t matter if it s oil painting, sand castles, or mud pies! This will also kick up the endorphins.    Witness Beauty  (AKA stop and smell the roses) Take a look outside, even in mid-winter. Notice colors, textures. Watch the squirrels and birds.     Service to others  Be of service to others.  There is always someone else in need.  By helping others we can  get out of ourselves  and remember the really important things.  This also provides opportunities for practicing all the other parts of the program.    Humor  Laugh and be silly!  Adjust your TV habits for less news and crime-drama and more comedy.    Control your stress  Try breathing deep, massage therapy, biofeedback, and meditation. Find time to relax each day.     My support system    Clinic Contact:  Phone number:    Contact 1:  Phone number:    Contact 2:  Phone number:    [employer]/:  Phone number:    Therapist:  Phone number:    Local crisis center:    Phone number:    Other community support:  Phone number:       reactions to medicines/reactions to food

## 2020-06-16 ENCOUNTER — OFFICE VISIT (OUTPATIENT)
Dept: URGENT CARE | Facility: URGENT CARE | Age: 65
End: 2020-06-16
Payer: COMMERCIAL

## 2020-06-16 ENCOUNTER — NURSE TRIAGE (OUTPATIENT)
Dept: NURSING | Facility: CLINIC | Age: 65
End: 2020-06-16

## 2020-06-16 VITALS
TEMPERATURE: 98.3 F | DIASTOLIC BLOOD PRESSURE: 76 MMHG | SYSTOLIC BLOOD PRESSURE: 136 MMHG | OXYGEN SATURATION: 93 % | HEIGHT: 67 IN | HEART RATE: 93 BPM | BODY MASS INDEX: 20.81 KG/M2 | WEIGHT: 132.6 LBS

## 2020-06-16 DIAGNOSIS — R22.0 SWELLING OF BOTH LIPS: Primary | ICD-10-CM

## 2020-06-16 DIAGNOSIS — K13.0 SORE OF LIP: ICD-10-CM

## 2020-06-16 DIAGNOSIS — T78.40XA ALLERGIC REACTION, INITIAL ENCOUNTER: ICD-10-CM

## 2020-06-16 DIAGNOSIS — J45.20 MILD INTERMITTENT ASTHMA WITHOUT COMPLICATION: ICD-10-CM

## 2020-06-16 PROCEDURE — 99214 OFFICE O/P EST MOD 30 MIN: CPT | Performed by: NURSE PRACTITIONER

## 2020-06-16 RX ORDER — PREDNISONE 20 MG/1
20 TABLET ORAL 2 TIMES DAILY
Qty: 10 TABLET | Refills: 0 | Status: SHIPPED | OUTPATIENT
Start: 2020-06-16 | End: 2020-07-28

## 2020-06-16 ASSESSMENT — MIFFLIN-ST. JEOR: SCORE: 1184.1

## 2020-06-16 ASSESSMENT — ENCOUNTER SYMPTOMS
RHINORRHEA: 0
SHORTNESS OF BREATH: 0
WHEEZING: 1
NAUSEA: 0
SORE THROAT: 0
COUGH: 0
FEVER: 0
CHILLS: 0
VOMITING: 0
HEADACHES: 0
DIARRHEA: 0

## 2020-06-16 ASSESSMENT — PAIN SCALES - GENERAL: PAINLEVEL: EXTREME PAIN (8)

## 2020-06-16 NOTE — PATIENT INSTRUCTIONS
Patient Education     General Allergic Reactions  An allergic reaction is a set of symptoms caused by an allergen. An allergen is something that causes a person s immune system to react. When a person comes in contact with an allergen, it causes the body to release chemicals. These include the chemical histamine. Histamine causes swelling and itching. It may affect the entire body. This is called a general allergic reaction. Often symptoms affect only 1 part of the body. This is called a local allergic reaction.  You are having an allergic reaction. Almost anything can cause one. Different people are allergic to different things. It is usually something that you ate or swallowed, came into contact with by getting or putting it on your skin or clothes, or something you breathed in the air. This can be very annoying and sometimes scary.  Most of us think of allergic reactions when we have a rash or itchy skin. Symptoms can include:    Itching of the eyes, nose, and roof of the mouth    Runny or stuffy nose    Watery eyes     Sneezing or coughing     A blocked feeling in the ear    Red, itchy rash called hives    Red and purple spots    Rash, redness, welts, blisters    Itching, burning, stinging, pain    Dry, flaky, cracking, scaly skin  Severe symptoms include:    Swelling of the face, lips, or other parts of the body    Hoarse voice    Trouble swallowing, feeling like your throat is closing    Trouble breathing, wheezing    Nausea, vomiting, diarrhea, stomach cramps    Feeling faint or lightheaded, rapid heart rate  Sometimes the cause may be obvious. But there are so many things that can cause a reaction that you may not be able to figure out. The most important things to help find your allergen are:    Remembering when it started    What you were doing at the time or just before that    Any activities you were involved in    Any new products or contacts  Below are some common causes. But remember that almost  anything can cause a reaction. You may not even be aware that you came into contact with one of these things:    Dust, mold, pollen    Plants (common ones are poison ivy and poison oak, but there are many others)     Animals    Foods such as shrimp, shellfish, peanuts, milk products, gluten, and eggs. Also food colorings, flavorings, and additives.    Insect bites or stings such as bees, mosquitos, fleas, ticks    Medicines such as penicillin, sulfa medicines, amoxicillin, aspirin, and ibuprofen. But any medicine can cause a reaction.    Jewelry such as nickel or gold. This can be new, or something you ve worn for a while, including zippers and buttons.    Latex such as in gloves, clothes, toys, balloons, or some tapes. Some people allergic to latex may also have problems with foods like bananas, avocados, kiwi, papaya, or chestnuts.    Lotions, perfumes, cosmetics, soaps, shampoos, skincare products, nail products    Chemicals or dyes in clothing, linen, , hair dyes, soaps, iodine  Many viruses and common colds can cause a rash that is not an allergic reaction. Sometimes it is hard to tell the difference between allergies, sensitivity, or an intolerance to something. This is especially true with food. Many things can cause diarrhea, vomiting, stomach cramps, and skin irritation.  Home care    The goal of treatment is to help relieve the symptoms and get you feeling better. The rash will usually fade over several days. But it can sometimes last a couple of weeks. Over the next couple of days, there may be times when it is gets a little worse, and then better again. Here are some things to do:    If you know what you are allergic to, stay away from it. Future reactions could be worse than this one.    Avoid tight clothing and anything that heats up your skin (hot showers or baths, direct sunlight). Heat will make itching worse.    An ice pack will relieve local areas of intense itching and redness. To make an  ice pack, put ice cubes in a plastic bag that seals at the top. Wrap it in a thin, clean towel. Don t put the ice directly on the skin because it can damage the skin.    Oral diphenhydramine is an over-the-counter antihistamine sold at pharmacy and grocery stores. Unless a prescription antihistamine was given, diphenhydramine may be used to reduce itching if large areas of the skin are involved. It may make you sleepy. So be careful using it in the daytime or when going to school, working, or driving. Note: Don t use diphenhydramine if you have glaucoma or if you are a man with trouble urinating due to an enlarged prostate. There are other antihistamines that won t make you so sleepy. These are good choices for daytime use. Ask your pharmacist for suggestions.    Don t use diphenhydramine cream on your skin. It can cause a further reaction in some people.    To help prevent an infection, don't scratch the affected area. Scratching may worsen the reaction and damage your skin. It can also lead to an infection. Always check the affected for signs of an infection.    Call your healthcare provider and ask what you can use to help decrease the itching.    To decrease allergic reactions, try the following:      Use heat-steam to clean your home    Use high-efficiency particulate (HEPA) vacuums and filters    Stay away from food and pet triggers    Kill any cockroaches    Clean your house often  Follow-up care  Follow up with your healthcare provider, or as advised. If you had a severe reaction today, or if you have had several mild to medium allergic reactions in the past, ask your provider about allergy testing. This can help you find out what you are allergic to. If your reaction included dizziness, fainting, or trouble breathing or swallowing, ask your provider about carrying auto-injectable epinephrine.  Call 911  Call 911 if any of these occur:    Trouble breathing or swallowing, wheezing    Cool, moist, pale  skin    Shortness of breath    Hoarse voice or trouble speaking    Confused     Very drowsy or trouble awakening    Fainting or loss of consciousness    Rapid heart rate    Feeling of dizziness or weakness or a sudden drop in blood pressure    Feeling of doom    Feeling lightheaded    Severe nausea or vomiting, or diarrhea    Seizure    Swelling in the face, eyelids, lips, mouth, throat or tongue    Drooling  When to seek medical advice  Call your healthcare provider right away if any of these occur:    Spreading areas of itching, redness or swelling    Nausea or stomach cramps or abdominal pain    Continuing or recurring symptoms    Spreading areas of redness, swelling, or itching    Signs of infection at the affected site:  ? Spreading redness  ? Increased pain or swelling  ? Fluid or colored drainage from the site  ? Fever of 100.4 F (38 C) or above lasting for 24 to 48 hours, or as directed by your provider  Date Last Reviewed: 3/1/2017    0774-6981 [employer]. [address] [place] [address]. All rights reserved. This information is not intended as a substitute for professional medical care. Always follow your healthcare professional's instructions.           Patient Education     Canker Sore    A canker sore (also called an aphthous ulcer) is a painful sore on the lining of the mouth. It is most painful during the first few days, and it lasts about 7 to 14 days before going away.  Causes  Canker sores are not cold sores or fever blisters. They are not contagious, so they are not spread by contact. The exact cause of canker sores is not clear, but there are a number of things that can trigger them in different people.    Mild injury, such as biting the inside of the mouth, lip, or cheek, or dental procedures    Stress    Poor diet, or lack of certain nutrients, including B vitamins and iron    Foods that can irritate the mouth, including tomatoes, citrus fruits, and some nuts (foods  that are acidic or contain bitter substances called tannins)    Irritating chemicals, such as those in some toothpastes and mouthwashes    Certain chronic illnesses  Symptoms  Canker sores are found on the lining of the mouth. They can be inside the cheeks or lips, on the roof of the mouth, at the base of the gums, on the tongue, or in the back of the throat. Canker sores typically have these characteristics:    Small, flat (not raised) sores    Can be white or yellowish bumps that are red around the edges or have a red halo    Usually small in size, roundish, and in groups    Accompanied by pain or burning  Canker sores don't leave a scar. But they usually come back.  Home care  The goals of canker sore treatment are to decrease the pain, speed healing, and prevent sores from coming back. No single treatment works for everyone. Try a number of methods to see what works best.  General care    You may find that soft, easy-to-chew foods cause less pain. Use a straw to direct liquids away from the sore.    Use a soft-bristle toothbrush, and brush your teeth gently.    Don t eat acidic, salty, or spicy foods.    Don t eat crusty or crunchy foods such as French bread or potato chips. These kinds of foods can hurt the inside of your mouth, or scrape your existing canker sores.  Medicines  You can try over-the-counter medicines that cover the sores and numb them. This protects the sores while they heal and helps reduce pain.  Homemade rinses and solutions  You can use these solutions as mouth rinses. Spit them out after using them. You can also dab them on the sores. You can repeat these treatments as often as needed.    Rinse your mouth with saltwater.    Mix equal amounts of hydrogen peroxide and water. You can use this as a mouthwash or dab it on spots with a cotton swab. You can also add sodium bicarbonate to this to make a paste, and then dab it on spots.  Follow-up care  Follow up with your healthcare provider, or as  advised.    If a culture was done, you will be notified if the treatment needs to be changed. You can call as directed for the results.  Call 911  Call 911 if any of these occur:    Trouble breathing    Inability to swallow    Extreme drowsiness or trouble waking up    Fainting or loss of consciousness    Rapid heart rate    Seizure    Stiff neck  When to seek medical advice  Call your healthcare provider right away if any of these occur:    You have a fever of 100.4 F (38 C) or higher, or as directed by your provider    You are pregnant    You just had surgery or another medical procedure, or you were just discharged from the hospital    It's too painful to eat or swallow  Date Last Reviewed: 10/1/2017    [id number] [employer] [address] [place] [address], [place] [address]. All rights reserved. This information is not intended as a substitute for professional medical care. Always follow your healthcare professional's instructions.

## 2020-06-16 NOTE — PROGRESS NOTES
SUBJECTIVE:   Wendy Francis is a 65 year old female presenting with a chief complaint of   Chief Complaint   Patient presents with     Allergies     swollen lips since last saturday        She is an established patient of [facility].    Swelling of lips.  Wendy Francis is a 65-year-old female presenting to urgent care with complaints of swelling on both upper and lower lips.  She also reports seeing small sores on the lower lips.  The swelling started 2 days ago.  She is unaware of any exposure to any allergens.  She has not used any medication.  Apart from the swelling the lips also feels warm.  She denies shortness of breath, fever, difficulty swallowing, cough, rashes on the body.    She reports that her asthma is under control and she uses albuterol.  She reports occasional wheezes but not significant at the moment.  Review of Systems   Constitutional: Negative for chills and fever.   HENT: Negative for congestion, ear pain, rhinorrhea and sore throat.    Respiratory: Positive for wheezing. Negative for cough and shortness of breath.    Gastrointestinal: Negative for diarrhea, nausea and vomiting.   Skin:        Swelling of both upper and lower lips.  Small rashes on the lower lip.   Neurological: Negative for headaches.       Past Medical History:   Diagnosis Date     Allergic rhinitis     molds, dust     Atrophic vaginitis      Cervical cancer (H) 4/10/2000    chemo and radiation, IB-II squamous cell, positive nodes     Degenerative joint disease of right hip      Diverticulosis of colon 6/2004     Hypertension goal BP (blood pressure) < 140/90      Liver hemangioma      Lung nodules      Mild persistent asthma      Mild recurrent major depression (H)      Nasal polyps      Overactive bladder      Sensorineural hearing losses      Tinnitus      Varicose vein of leg     No surgery     Family History   Problem Relation Age of Onset     Cancer Maternal Grandmother         pelvic     Ovarian Cancer Maternal  Grandmother      Neurologic Disorder Sister         brain aneurysm     Hypertension Father      Asthma Father      Pacemaker Father      Diabetes Maternal Grandfather      Allergies Brother      Coronary Artery Disease Paternal Grandmother      Cerebrovascular Disease Sister      C.A.D. No family hx of      Current Outpatient Medications   Medication Sig Dispense Refill     albuterol (PROAIR HFA/PROVENTIL HFA/VENTOLIN HFA) 108 (90 Base) MCG/ACT inhaler Inhale 2 puffs into the lungs every 4 hours as needed for shortness of breath / dyspnea 1 Inhaler 4     amLODIPine (NORVASC) 10 MG tablet Take 1 tablet (10 mg) by mouth daily 90 tablet 0     benzocaine (ANBESOL) 10 % gel Take by mouth 4 times daily as needed for moderate pain 1 Tube 0     budesonide-formoterol (SYMBICORT) 80-4.5 MCG/ACT Inhaler USE 2 INHALATIONS TWICE A DAY 30.6 g 3     cetirizine (ZYRTEC) 10 MG tablet Take 1 tablet by mouth daily as needed for allergies.       COMPOUNDED NON-CONTROLLED SUBSTANCE (CMPD RX) - PHARMACY TO MIX COMPOUNDED MEDICATION Budesonide 2 mg/L irrigation solution. Rinse with 20 mls in each nostril two times a day 80 mL 11     ipratropium - albuterol 0.5 mg/2.5 mg/3 mL (DUONEB) 0.5-2.5 (3) MG/3ML neb solution Take 1 vial (3 mLs) by nebulization every 6 hours as needed for shortness of breath / dyspnea or wheezing 30 vial 3     MULTI-VITAMIN OR DAILY       Omega-3 Fatty Acids (FISH OIL) 500 MG CAPS Take  by mouth. 1 daily         predniSONE (DELTASONE) 20 MG tablet Take 1 tablet (20 mg) by mouth 2 times daily for 5 days 10 tablet 0     tolterodine ER (DETROL LA) 4 MG 24 hr capsule Take 1 capsule (4 mg) by mouth daily 90 capsule 1     Triamcinolone Acetonide (NASACORT ALLERGY 24HR NA)        Social History     Tobacco Use     Smoking status: Never Smoker     Smokeless tobacco: Never Used   Substance Use Topics     Alcohol use: Yes     Alcohol/week: 1.7 standard drinks     Comment: 1-2 a month       OBJECTIVE  /76   Pulse 93    "Temp 98.3  F (36.8  C) (Oral)   Ht 1.702 m (5' 7\")   Wt 60.1 kg (132 lb 9.6 oz)   LMP  (LMP Unknown)   SpO2 93%   Breastfeeding No   BMI 20.77 kg/m      Physical Exam  Vitals signs and nursing note reviewed.   Constitutional:       General: She is not in acute distress.     Appearance: She is well-developed. She is not diaphoretic.   HENT:      Head: Normocephalic and atraumatic.      Right Ear: Tympanic membrane and external ear normal.      Left Ear: Tympanic membrane and external ear normal.      Mouth/Throat:      Lips: Pink.      Comments: Swelling of upper and lower lip  Tiny vesicles along the vermiform on the lower lip.  Eyes:      Pupils: Pupils are equal, round, and reactive to light.   Neck:      Musculoskeletal: Normal range of motion and neck supple.   Pulmonary:      Effort: Pulmonary effort is normal. No respiratory distress.      Breath sounds: Examination of the left-upper field reveals wheezing. Wheezing present.   Lymphadenopathy:      Cervical: No cervical adenopathy.   Skin:     General: Skin is warm and dry.   Neurological:      Mental Status: She is alert.      Cranial Nerves: No cranial nerve deficit.         ASSESSMENT:      ICD-10-CM    1. Swelling of both lips  R22.0 predniSONE (DELTASONE) 20 MG tablet   2. Allergic reaction, initial encounter  T78.40XA predniSONE (DELTASONE) 20 MG tablet   3. Sore of lip  K13.0 benzocaine (ANBESOL) 10 % gel   4. Mild intermittent asthma without complication  J45.20           Serious Comorbid Conditions:  Adult:  Asthma    PLAN:  I have discussed the possibility of an allergic reaction and advised to avoid allergens if known.  We will use a small dose prednisone.  oragel is ordered for the small rash on the lower lip.  In addition to the above, asthma  was also briefly addressed today. Health maintenance, medications were reviewed. Patient advised to follow up with PCP with any other concerns.                    Patient Instructions     Patient " Education     General Allergic Reactions  An allergic reaction is a set of symptoms caused by an allergen. An allergen is something that causes a person s immune system to react. When a person comes in contact with an allergen, it causes the body to release chemicals. These include the chemical histamine. Histamine causes swelling and itching. It may affect the entire body. This is called a general allergic reaction. Often symptoms affect only 1 part of the body. This is called a local allergic reaction.  You are having an allergic reaction. Almost anything can cause one. Different people are allergic to different things. It is usually something that you ate or swallowed, came into contact with by getting or putting it on your skin or clothes, or something you breathed in the air. This can be very annoying and sometimes scary.  Most of us think of allergic reactions when we have a rash or itchy skin. Symptoms can include:    Itching of the eyes, nose, and roof of the mouth    Runny or stuffy nose    Watery eyes     Sneezing or coughing     A blocked feeling in the ear    Red, itchy rash called hives    Red and purple spots    Rash, redness, welts, blisters    Itching, burning, stinging, pain    Dry, flaky, cracking, scaly skin  Severe symptoms include:    Swelling of the face, lips, or other parts of the body    Hoarse voice    Trouble swallowing, feeling like your throat is closing    Trouble breathing, wheezing    Nausea, vomiting, diarrhea, stomach cramps    Feeling faint or lightheaded, rapid heart rate  Sometimes the cause may be obvious. But there are so many things that can cause a reaction that you may not be able to figure out. The most important things to help find your allergen are:    Remembering when it started    What you were doing at the time or just before that    Any activities you were involved in    Any new products or contacts  Below are some common causes. But remember that almost anything can  cause a reaction. You may not even be aware that you came into contact with one of these things:    Dust, mold, pollen    Plants (common ones are poison ivy and poison oak, but there are many others)     Animals    Foods such as shrimp, shellfish, peanuts, milk products, gluten, and eggs. Also food colorings, flavorings, and additives.    Insect bites or stings such as bees, mosquitos, fleas, ticks    Medicines such as penicillin, sulfa medicines, amoxicillin, aspirin, and ibuprofen. But any medicine can cause a reaction.    Jewelry such as nickel or gold. This can be new, or something you ve worn for a while, including zippers and buttons.    Latex such as in gloves, clothes, toys, balloons, or some tapes. Some people allergic to latex may also have problems with foods like bananas, avocados, kiwi, papaya, or chestnuts.    Lotions, perfumes, cosmetics, soaps, shampoos, skincare products, nail products    Chemicals or dyes in clothing, linen, , hair dyes, soaps, iodine  Many viruses and common colds can cause a rash that is not an allergic reaction. Sometimes it is hard to tell the difference between allergies, sensitivity, or an intolerance to something. This is especially true with food. Many things can cause diarrhea, vomiting, stomach cramps, and skin irritation.  Home care    The goal of treatment is to help relieve the symptoms and get you feeling better. The rash will usually fade over several days. But it can sometimes last a couple of weeks. Over the next couple of days, there may be times when it is gets a little worse, and then better again. Here are some things to do:    If you know what you are allergic to, stay away from it. Future reactions could be worse than this one.    Avoid tight clothing and anything that heats up your skin (hot showers or baths, direct sunlight). Heat will make itching worse.    An ice pack will relieve local areas of intense itching and redness. To make an ice pack,  put ice cubes in a plastic bag that seals at the top. Wrap it in a thin, clean towel. Don t put the ice directly on the skin because it can damage the skin.    Oral diphenhydramine is an over-the-counter antihistamine sold at pharmacy and grocery stores. Unless a prescription antihistamine was given, diphenhydramine may be used to reduce itching if large areas of the skin are involved. It may make you sleepy. So be careful using it in the daytime or when going to school, working, or driving. Note: Don t use diphenhydramine if you have glaucoma or if you are a man with trouble urinating due to an enlarged prostate. There are other antihistamines that won t make you so sleepy. These are good choices for daytime use. Ask your pharmacist for suggestions.    Don t use diphenhydramine cream on your skin. It can cause a further reaction in some people.    To help prevent an infection, don't scratch the affected area. Scratching may worsen the reaction and damage your skin. It can also lead to an infection. Always check the affected for signs of an infection.    Call your healthcare provider and ask what you can use to help decrease the itching.    To decrease allergic reactions, try the following:      Use heat-steam to clean your home    Use high-efficiency particulate (HEPA) vacuums and filters    Stay away from food and pet triggers    Kill any cockroaches    Clean your house often  Follow-up care  Follow up with your healthcare provider, or as advised. If you had a severe reaction today, or if you have had several mild to medium allergic reactions in the past, ask your provider about allergy testing. This can help you find out what you are allergic to. If your reaction included dizziness, fainting, or trouble breathing or swallowing, ask your provider about carrying auto-injectable epinephrine.  Call 911  Call 911 if any of these occur:    Trouble breathing or swallowing, wheezing    Cool, moist, pale skin    Shortness  of breath    Hoarse voice or trouble speaking    Confused     Very drowsy or trouble awakening    Fainting or loss of consciousness    Rapid heart rate    Feeling of dizziness or weakness or a sudden drop in blood pressure    Feeling of doom    Feeling lightheaded    Severe nausea or vomiting, or diarrhea    Seizure    Swelling in the face, eyelids, lips, mouth, throat or tongue    Drooling  When to seek medical advice  Call your healthcare provider right away if any of these occur:    Spreading areas of itching, redness or swelling    Nausea or stomach cramps or abdominal pain    Continuing or recurring symptoms    Spreading areas of redness, swelling, or itching    Signs of infection at the affected site:  ? Spreading redness  ? Increased pain or swelling  ? Fluid or colored drainage from the site  ? Fever of 100.4 F (38 C) or above lasting for 24 to 48 hours, or as directed by your provider  Date Last Reviewed: 3/1/2017    4305-4201 [employer]. [address] [place] [address]. All rights reserved. This information is not intended as a substitute for professional medical care. Always follow your healthcare professional's instructions.           Patient Education     Canker Sore    A canker sore (also called an aphthous ulcer) is a painful sore on the lining of the mouth. It is most painful during the first few days, and it lasts about 7 to 14 days before going away.  Causes  Canker sores are not cold sores or fever blisters. They are not contagious, so they are not spread by contact. The exact cause of canker sores is not clear, but there are a number of things that can trigger them in different people.    Mild injury, such as biting the inside of the mouth, lip, or cheek, or dental procedures    Stress    Poor diet, or lack of certain nutrients, including B vitamins and iron    Foods that can irritate the mouth, including tomatoes, citrus fruits, and some nuts (foods that are acidic or  contain bitter substances called tannins)    Irritating chemicals, such as those in some toothpastes and mouthwashes    Certain chronic illnesses  Symptoms  Canker sores are found on the lining of the mouth. They can be inside the cheeks or lips, on the roof of the mouth, at the base of the gums, on the tongue, or in the back of the throat. Canker sores typically have these characteristics:    Small, flat (not raised) sores    Can be white or yellowish bumps that are red around the edges or have a red halo    Usually small in size, roundish, and in groups    Accompanied by pain or burning  Canker sores don't leave a scar. But they usually come back.  Home care  The goals of canker sore treatment are to decrease the pain, speed healing, and prevent sores from coming back. No single treatment works for everyone. Try a number of methods to see what works best.  General care    You may find that soft, easy-to-chew foods cause less pain. Use a straw to direct liquids away from the sore.    Use a soft-bristle toothbrush, and brush your teeth gently.    Don t eat acidic, salty, or spicy foods.    Don t eat crusty or crunchy foods such as French bread or potato chips. These kinds of foods can hurt the inside of your mouth, or scrape your existing canker sores.  Medicines  You can try over-the-counter medicines that cover the sores and numb them. This protects the sores while they heal and helps reduce pain.  Homemade rinses and solutions  You can use these solutions as mouth rinses. Spit them out after using them. You can also dab them on the sores. You can repeat these treatments as often as needed.    Rinse your mouth with saltwater.    Mix equal amounts of hydrogen peroxide and water. You can use this as a mouthwash or dab it on spots with a cotton swab. You can also add sodium bicarbonate to this to make a paste, and then dab it on spots.  Follow-up care  Follow up with your healthcare provider, or as advised.    If a  culture was done, you will be notified if the treatment needs to be changed. You can call as directed for the results.  Call 911  Call 911 if any of these occur:    Trouble breathing    Inability to swallow    Extreme drowsiness or trouble waking up    Fainting or loss of consciousness    Rapid heart rate    Seizure    Stiff neck  When to seek medical advice  Call your healthcare provider right away if any of these occur:    You have a fever of 100.4 F (38 C) or higher, or as directed by your provider    You are pregnant    You just had surgery or another medical procedure, or you were just discharged from the hospital    It's too painful to eat or swallow  Date Last Reviewed: 10/1/2017    8922-2203 [employer]. [address] [place] [address], [place] [address]. All rights reserved. This information is not intended as a substitute for professional medical care. Always follow your healthcare professional's instructions.

## 2020-06-17 NOTE — TELEPHONE ENCOUNTER
Patient was sen today and two RX that were sent to [facility] pharmacy need transferred to a different pharmacy, they are experiencing computer issues. transferred RX with verbal order per josse/MD.  Mary Peralta RN [facility]

## 2020-06-23 NOTE — PROGRESS NOTES
"SUBJECTIVE:   Wendy Francis is here in follow up of right hip advanced osteoarthritis.    Present symptoms: pain in the groin.  Location varies. Pain in buttock and thigh with walking.  Occasional night pain.   She complains that her ability to walk/hike has greatly diminished in the last few months    Treatments tried to this point: corticosteroid injection in December.  Length of effectiveness: did not work at all.    Review of Systems:  Constitutional/General: Negative for fever, chills, change in weight  Integumentary/Skin: Negative for worrisome rashes, moles, or lesions  Neuro: Negative for weakness, dizziness, or paresthesias   Psychiatric: negative for changes in mood or affect    OBJECTIVE:  Physical Exam:  /74 (BP Location: Left arm, Patient Position: Sitting, Cuff Size: Adult Regular)   Pulse 84   Ht 1.702 m (5' 7\")   Wt 59 kg (130 lb)   LMP  (LMP Unknown)   BMI 20.36 kg/m    General Appearance: healthy, alert and no distress   Skin: no suspicious lesions or rashes  Neuro: Normal strength and tone, mentation intact and speech normal  Vascular: good pulses, and capillary refill   Lymph: no lymphadenopathy   Psych:  mentation appears normal and affect normal/bright  Resp: no increased work of breathing    Right Hip Exam:  Tender: right greater trochanter  Hip range of motion:    Flexion: 120 degrees, with mild flexion contracture   Internal Rotation: 15 degrees   External Rotation: 30* degrees   Abduction: 30* degrees  Strength: full strength  Leg lengths: not tested  Special tests:  Trendelenburg negative.    Right hip xrays from 11/2019:    MRI 12/20/19:    New xrays today 6/24/2020 : complete superior hip joint space narrowing.  Large acetabular cyst.    Addendum from radiology:   1. Possible lytic lesion left pubic bone.  2. Advanced osteoarthritis right hip.    ASSESSMENT:   Osteoarthritis right hip, advanced.    PLAN:   Radiology has graciously agreed to compare the xrays from [facility] in " November and the MRI pelvis from 12/9/19 with the xray today to see if there was any evidence of lytic changes on the left pubic ramus back then.    Total Hip Arthroplasty:   We talked about the patient's condition and diagnosis.  Because of severe arthritis, and failure of conservative measures, I have suggested right total hip arthroplasty.  I've talked in depth about the procedure and the risks, which include, but are not limited to blood loss requiring transfusion, infection, neurovascular injury, DVT, PE, pain, (both perioperative and persistent post-recovery pain), dislocation, leg length discrepancy, intra-operative fracture, potential anesthetic and perioperative medical complications, and the possibility of needing additional procedures. We also talked about recovery time, which differs from patient to patient.  We've encouraged attendance at the arthroplasty class at the hospital.  Medical clearance will need to be obtained.  She is considering her options about approaches, and she will call to schedule, or may get a 2nd opinion for an alternative surgical approach.    The overall risk for thromboembolic events in this patient is moderate due to previous cancer, and lovenox, coumadin or Eliquis will be used for postoperative prophylaxis.    Total time spent, aside from any procedure time, was 40 minutes; with 30 minutes spent face-to-face with patient dedicated to education, counseling and a development of a treatment plan.      JOAQUIN Jacobsen MD  Dept. Orthopedic Surgery  [facility]

## 2020-06-24 ENCOUNTER — OFFICE VISIT (OUTPATIENT)
Dept: ORTHOPEDICS | Facility: CLINIC | Age: 65
End: 2020-06-24
Payer: COMMERCIAL

## 2020-06-24 ENCOUNTER — ANCILLARY PROCEDURE (OUTPATIENT)
Dept: GENERAL RADIOLOGY | Facility: CLINIC | Age: 65
End: 2020-06-24
Attending: ORTHOPAEDIC SURGERY
Payer: COMMERCIAL

## 2020-06-24 VITALS
HEIGHT: 67 IN | BODY MASS INDEX: 20.4 KG/M2 | DIASTOLIC BLOOD PRESSURE: 74 MMHG | WEIGHT: 130 LBS | SYSTOLIC BLOOD PRESSURE: 125 MMHG | HEART RATE: 84 BPM

## 2020-06-24 DIAGNOSIS — M16.11 PRIMARY OSTEOARTHRITIS OF RIGHT HIP: ICD-10-CM

## 2020-06-24 DIAGNOSIS — M16.11 PRIMARY OSTEOARTHRITIS OF RIGHT HIP: Primary | ICD-10-CM

## 2020-06-24 PROCEDURE — 73502 X-RAY EXAM HIP UNI 2-3 VIEWS: CPT

## 2020-06-24 PROCEDURE — 99214 OFFICE O/P EST MOD 30 MIN: CPT | Performed by: ORTHOPAEDIC SURGERY

## 2020-06-24 ASSESSMENT — MIFFLIN-ST. JEOR: SCORE: 1172.31

## 2020-06-24 NOTE — LETTER
"    6/24/2020         RE: Wendy Francis  [address] [place] [id number]        Dear Colleague,    Thank you for referring your patient, Wendy Francis, to the [facility]. Please see a copy of my visit note below.    SUBJECTIVE:   Wendy Francis is here in follow up of right hip advanced osteoarthritis.    Present symptoms: pain in the groin.  Location varies. Pain in buttock and thigh with walking.  Occasional night pain.   She complains that her ability to walk/hike has greatly diminished in the last few months    Treatments tried to this point: corticosteroid injection in December.  Length of effectiveness: did not work at all.    Review of Systems:  Constitutional/General: Negative for fever, chills, change in weight  Integumentary/Skin: Negative for worrisome rashes, moles, or lesions  Neuro: Negative for weakness, dizziness, or paresthesias   Psychiatric: negative for changes in mood or affect    OBJECTIVE:  Physical Exam:  /74 (BP Location: Left arm, Patient Position: Sitting, Cuff Size: Adult Regular)   Pulse 84   Ht 1.702 m (5' 7\")   Wt 59 kg (130 lb)   LMP  (LMP Unknown)   BMI 20.36 kg/m    General Appearance: healthy, alert and no distress   Skin: no suspicious lesions or rashes  Neuro: Normal strength and tone, mentation intact and speech normal  Vascular: good pulses, and capillary refill   Lymph: no lymphadenopathy   Psych:  mentation appears normal and affect normal/bright  Resp: no increased work of breathing    Right Hip Exam:  Tender: right greater trochanter  Hip range of motion:    Flexion: 120 degrees, with mild flexion contracture   Internal Rotation: 15 degrees   External Rotation: 30* degrees   Abduction: 30* degrees  Strength: full strength  Leg lengths: not tested  Special tests:  Trendelenburg negative.    Right hip xrays from 11/2019:    MRI 12/20/19:    New xrays today 6/24/2020 : complete superior hip joint space narrowing.  Large acetabular " cyst.    Addendum from radiology:   1. Possible lytic lesion left pubic bone.  2. Advanced osteoarthritis right hip.    ASSESSMENT:   Osteoarthritis right hip, advanced.    PLAN:   Radiology has graciously agreed to compare the xrays from [facility] in November and the MRI pelvis from 12/9/19 with the xray today to see if there was any evidence of lytic changes on the left pubic ramus back then.    Total Hip Arthroplasty:   We talked about the patient's condition and diagnosis.  Because of severe arthritis, and failure of conservative measures, I have suggested right total hip arthroplasty.  I've talked in depth about the procedure and the risks, which include, but are not limited to blood loss requiring transfusion, infection, neurovascular injury, DVT, PE, pain, (both perioperative and persistent post-recovery pain), dislocation, leg length discrepancy, intra-operative fracture, potential anesthetic and perioperative medical complications, and the possibility of needing additional procedures. We also talked about recovery time, which differs from patient to patient.  We've encouraged attendance at the arthroplasty class at the hospital.  Medical clearance will need to be obtained.  She is considering her options about approaches, and she will call to schedule, or may get a 2nd opinion for an alternative surgical approach.    The overall risk for thromboembolic events in this patient is moderate due to previous cancer, and lovenox, coumadin or Eliquis will be used for postoperative prophylaxis.    Total time spent, aside from any procedure time, was 40 minutes; with 30 minutes spent face-to-face with patient dedicated to education, counseling and a development of a treatment plan.      JOAQUIN Jacobsen MD  Dept. Orthopedic Surgery  [facility]       Again, thank you for allowing me to participate in the care of your patient.        Sincerely,        Jamey Jacobsen MD

## 2020-06-25 ENCOUNTER — MYC MEDICAL ADVICE (OUTPATIENT)
Dept: ORTHOPEDICS | Facility: CLINIC | Age: 65
End: 2020-06-25

## 2020-06-25 DIAGNOSIS — M16.11 PRIMARY OSTEOARTHRITIS OF RIGHT HIP: Primary | ICD-10-CM

## 2020-06-26 LAB — RADIOLOGIST FLAGS: ABNORMAL

## 2020-06-26 NOTE — TELEPHONE ENCOUNTER
Bennie Tucker;    Dr Joe is experienced with this anterior hip approach  technique. I will place an order in your chart that should promt a call to you for a future consult.  His main office is at [address]/ [address]. Thank you for the update!    [facility].

## 2020-06-29 ENCOUNTER — OFFICE VISIT (OUTPATIENT)
Dept: ORTHOPEDICS | Facility: CLINIC | Age: 65
End: 2020-06-29
Payer: COMMERCIAL

## 2020-06-29 ENCOUNTER — TELEPHONE (OUTPATIENT)
Dept: ORTHOPEDICS | Facility: CLINIC | Age: 65
End: 2020-06-29

## 2020-06-29 VITALS
HEIGHT: 67 IN | HEART RATE: 85 BPM | WEIGHT: 131.7 LBS | BODY MASS INDEX: 20.67 KG/M2 | SYSTOLIC BLOOD PRESSURE: 134 MMHG | DIASTOLIC BLOOD PRESSURE: 87 MMHG

## 2020-06-29 DIAGNOSIS — M25.551 HIP PAIN, CHRONIC, RIGHT: ICD-10-CM

## 2020-06-29 DIAGNOSIS — G89.29 HIP PAIN, CHRONIC, RIGHT: ICD-10-CM

## 2020-06-29 DIAGNOSIS — M16.11 PRIMARY OSTEOARTHRITIS OF RIGHT HIP: Primary | ICD-10-CM

## 2020-06-29 PROCEDURE — 99214 OFFICE O/P EST MOD 30 MIN: CPT | Performed by: ORTHOPAEDIC SURGERY

## 2020-06-29 ASSESSMENT — MIFFLIN-ST. JEOR: SCORE: 1180.02

## 2020-06-29 ASSESSMENT — PAIN SCALES - GENERAL: PAINLEVEL: MODERATE PAIN (5)

## 2020-06-29 NOTE — PROGRESS NOTES
Chief Complaint:   Chief Complaint   Patient presents with     Right Hip - Pain     Last injection with Dr. Herrera 12/19/19. Has seen Dr. Jacobsen. Patient notes she is here because she would like to have anterior approach. Onset: a couple years but has gradually gotten worse. She was able to shake it off but is unable to do that      Hip Pain     anymore. Walking makes it worse. She would like discuss surgery/options. Cortisone shot did not help.        HISTORY OF PRESENT ILLNESS    Wendy Francis is a 65 year old female seen for evaluation of ongoing right hip pain with no known injury.   Pain has been present for years, but getting worse. Locates pain in the groin area mostly, but also into the right buttock and down the right thigh to the knee. Pain with walking, weight bearing activities, being active, out of car, in/out of tub, shoes/socks. Occasional night pain. Ok at rest. She's had cortisone injection 12/2019 (Dr Herrera) without relief. Has followed with Dr. Jes Jacobsen in the past. She'd like to discuss surgery via anterior approach today.    She notes her ability to walk has decreased over the recent months due to pain. Basically walking across the street to get her mail is her limit. She denies any noticeable limb length inequality. She states her left hip is ok.    Denies low back pain. Reports occasional numbness and tingling down the leg. She reports lymphedema in the right leg.    Present symptoms: pain anteriorly and posteriorly, pain sharp, shooting, dull/achy , moderate pain.    Pain severity: 5/10  Frequency of symptoms: are constant  Exacerbating Factors: weight bearing, stairs, [id number], in and out of car  Relieving Factors: rest  Night Pain: occasional  Pain while at rest: No   Associated numbness or tingling: Yes       Other PMH:  has a past medical history of Allergic rhinitis, Atrophic vaginitis, Cervical cancer (H) (4/10/2000), Degenerative joint disease of right hip,  Diverticulosis of colon (6/2004), Hypertension goal BP (blood pressure) < 140/90, Liver hemangioma, Lung nodules, Mild persistent asthma, Mild recurrent major depression (H), Nasal polyps, Overactive bladder, Sensorineural hearing losses, Tinnitus, and Varicose vein of leg. She also has no past medical history of Cerebral infarction (H), Congestive heart failure, unspecified, COPD (chronic obstructive pulmonary disease) (H), Depressive disorder, Diabetes (H), Heart disease, History of blood transfusion, or Thyroid disease.  Patient Active Problem List   Diagnosis     Mild persistent asthma     Overactive bladder     History of cervical cancer     CARDIOVASCULAR SCREENING; LDL GOAL LESS THAN 160     Allergic rhinitis     Advanced directives, counseling/discussion     Hypertension goal BP (blood pressure) < 140/90     Nasal polyposis     Lung nodules     Serous cystadenoma of left ovary with borderline malignant features (H)     Degenerative joint disease of right hip     Elevated BP without diagnosis of hypertension       Surgical Hx:  has a past surgical history that includes CORRECT BUNION,SIMPLE; colposcopy,loop electrd cervix excis (2000); sinus surgery; Colonoscopy with CO2 insufflation (N/A, 4/12/2017); Laparoscopic salpingo-oophorectomy (Bilateral, 11/30/2018); and Cystoscopy (N/A, 11/30/2018).    Medications:   Current Outpatient Medications:      albuterol (PROAIR HFA/PROVENTIL HFA/VENTOLIN HFA) 108 (90 Base) MCG/ACT inhaler, Inhale 2 puffs into the lungs every 4 hours as needed for shortness of breath / dyspnea, Disp: 1 Inhaler, Rfl: 4     amLODIPine (NORVASC) 10 MG tablet, Take 1 tablet (10 mg) by mouth daily, Disp: 90 tablet, Rfl: 0     budesonide-formoterol (SYMBICORT) 80-4.5 MCG/ACT Inhaler, USE 2 INHALATIONS TWICE A DAY, Disp: 30.6 g, Rfl: 3     cetirizine (ZYRTEC) 10 MG tablet, Take 1 tablet by mouth daily as needed for allergies., Disp: , Rfl:      COMPOUNDED NON-CONTROLLED SUBSTANCE (CMPD RX) -  PHARMACY TO MIX COMPOUNDED MEDICATION, Budesonide 2 mg/L irrigation solution. Rinse with 20 mls in each nostril two times a day, Disp: 80 mL, Rfl: 11     ipratropium - albuterol 0.5 mg/2.5 mg/3 mL (DUONEB) 0.5-2.5 (3) MG/3ML neb solution, Take 1 vial (3 mLs) by nebulization every 6 hours as needed for shortness of breath / dyspnea or wheezing, Disp: 30 vial, Rfl: 3     MULTI-VITAMIN OR, DAILY, Disp: , Rfl:      Omega-3 Fatty Acids (FISH OIL) 500 MG CAPS, Take  by mouth. 1 daily , Disp: , Rfl:      tolterodine ER (DETROL LA) 4 MG 24 hr capsule, Take 1 capsule (4 mg) by mouth daily, Disp: 90 capsule, Rfl: 1     Triamcinolone Acetonide (NASACORT ALLERGY 24HR NA), , Disp: , Rfl:     Current Facility-Administered Medications:      betamethasone acet & sod phos (CELESTONE) injection 6 mg, 6 mg, , , Marquis Herrera MD, 6 mg at 12/19/19 1135     ropivacaine (NAROPIN) injection 2 mL, 2 mL, , , Marquis Herrera MD, 2 mL at 12/19/19 1135    Allergies:   Allergies   Allergen Reactions     Citalopram      Thinking changes     Flonase [Fluticasone Propionate]      Sulfa Drugs Rash     Sulfasalazine Rash       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  reports that she has never smoked. She has never used smokeless tobacco. She reports current alcohol use of about 1.7 standard drinks of alcohol per week. She reports that she does not use drugs.    Family Hx: family history includes Allergies in her brother; Asthma in her father; Cancer in her maternal grandmother; Cerebrovascular Disease in her sister; Coronary Artery Disease in her paternal grandmother; Diabetes in her maternal grandfather; Hypertension in her father; Neurologic Disorder in her sister; Ovarian Cancer in her maternal grandmother; Pacemaker in her father.    REVIEW OF SYSTEMS:  10 point ROS neg other than the symptoms noted above in the HPI and PAST MEDICAL HISTORY. Notables,  CONSTITUTIONAL:NEGATIVE for fever, chills, change in weight  INTEGUMENTARY/SKIN:  "NEGATIVE for worrisome rashes, moles or lesions  MUSCULOSKELETAL:See HPI above  NEURO: NEGATIVE for weakness, dizziness or paresthesias    PHYSICAL EXAM:  Ht 1.702 m (5' 7\")   Wt 59.7 kg (131 lb 11.2 oz)   LMP  (LMP Unknown)   BMI 20.63 kg/m     GENERAL APPEARANCE: healthy, alert, no distress  SKIN: no suspicious lesions or rashes  NEURO: Normal strength and tone, mentation intact and speech normal  PSYCH:  mentation appears normal and affect normal  RESPIRATORY: No increased work of breathing.  LYMPH: no palpable inguinal lymph nodes.    BILATERAL LOWER EXTREMITIES:  Gait: favors the right.  No gross deformities or masses.  No Quad atrophy, strength normal.  Intact sensation deep peroneal nerve, superficial peroneal nerve, med/lat tibial nerve, sural nerve, saphenous nerve  Intact EHL, EDL, TA, FHL, GS, quadriceps hamstrings and hip flexors  Toes warm and well perfused, brisk capillary refill. Palpable 2+ dp pulses.  Bilateral calf soft and nttp or squeeze.  DTRs: achilles 2+, patella 2+.  Edema: 1+  Leg lengths: grossly symmetric at medial malleolus.      BACK EXAM  Lumbar range of motion: flexion to touch toes causes pulling in calf and hamstrings, extension adequate causes anterior \"stretching\", side bend: adequate, bending to the right causes \"pinching\" right hip.  Squat: positive  Seated SLR: negative , bilateral.  Supine SLR: negative , bilateral.  Sciatic Notch tenderness: positive without radiation    LEFT HIP EXAM:      Palpation: Tender:   none  Strength:  Within normal limits.  Special tests:  Irritability (flexion/adduction/internal rotation) mild positive.  Hip range of motion: Internal rotation: 20, External rotation: 50, Flexion 115      RIGHT HIP EXAM:    Palpation: Tender:   Mild right groin/anterior hip  Strength:  4/5  Special tests:  Irritability (flexion/adduction/internal rotation) positive.  Hip range of motion: Internal rotation: 10, External rotation: 25, Flexion 110, pain with extremes " "of rotation        X-RAY: AP pelvis and AP/Lateral views right hip from 6/24/2020 were reviewed in clinic today. No obvious fractures or dislocations. There is bone on bone loss of superior right hip joint space with subchondral sclerosis. Prominent superior acetabular subchondral cyst. Marginal osteophytes. Fairly preserved left hip joint space, small marginal osteophytes. Lumbar degenerative changes noted. Metallic pelvic clips.      Impression: 66yo female with chronic right hip pain, advanced primary osteoarthritis.    Plan:   * discussed pain in groin/hip likely from advanced hip arthritis. This is wearing of the cartilage within the hip joint either due to normal \"wear and tear\" or following an injury. Any low back / buttock / radiating pain likely coming from the low back.  *  treatment options for hip arthritis and pain include: do nothing, NSAIDS, activity modification, Physical Therapy, injections, total hip arthroplasty. Risks and benefits of each discussed.   * did discuss patient is a candidate for total hip arthroplasty, and offered total hip arthroplasty to patient. Total hip arthroplasty will only attempt to provide pain relief from hip related arthritis only and not any pain from the low back. Any low back pain likely to continue.  *  Discussed risks of surgery include, but not limited to: bleeding, infection, pain, scar, damage to adjacent structures (e.g. Nerves, blood vessels, bone, cartilage), temporary or permanent nerve damage, recurrence of symptoms, implant dislocation, implant failure, implant infection, unequal limb lengths, stiffness, need for further surgery, blood clots, pulmonary embolism, risks of anesthesia, and death.  * patient would like to proceed with surgery: RIGHT total hip arthroplasty. Anterior approach  * will arrange RIGHT total hip arthroplasty at a mutual convenience in the near future  * discussed will plan hospitalization overnight, with discharge to home the next day " or short term rehabilitation facility, depending on postoperative recovery and progress during hospitalization.  * will plan postoperative deep vein thrombosis prophylaxis x4 weeks.  Additionally, graduated compression stockings x4 weeks, and SCDs while in the hospital.  * postoperative pain control will be oral medications upon discharge from hospital  * postoperative Physical Therapy will be discussed, if needed, at first postoperative visit at 2 weeks  * patient will need longterm prophylactic antibiotics use with dental procedures or other invasive procedures (2 g amoxicilin or 450mg ([id number]) clindamycin) 1 hour prior to dental procedures.  * patient will need preoperative H+P prior to surgery from PCP.  * will see patient 2 weeks postoperative, sooner as needed. Will obtain lowset AP pelvis and lateral right hip xray at that time.    * preop information/scrub/wipes provided today  * covid pcr test order signed and provided to patient today. To be obtained 5 days prior to date of surgery at [facility].    Adolfo Joe M.D., M.S.  Dept. of Orthopaedic Surgery  [facility]    * All questions were addressed and answered prior to discharge from clinic today. The patient acknowledges an understanding of and agreement with the plan set forth during today's visit. Patient was advised to call our office or MyChart us if any further questions arise upon leaving our office today.

## 2020-06-29 NOTE — LETTER
6/29/2020         RE: Wendy Francis  [address] [place] [address]  [place] [address]        Dear Colleague,    Thank you for referring your patient, Wendy Francis, to the [facility]. Please see a copy of my visit note below.    Chief Complaint:   Chief Complaint   Patient presents with     Right Hip - Pain     Last injection with Dr. Herrera 12/19/19. Has seen Dr. Jacobsen. Patient notes she is here because she would like to have anterior approach. Onset: a couple years but has gradually gotten worse. She was able to shake it off but is unable to do that      Hip Pain     anymore. Walking makes it worse. She would like discuss surgery/options. Cortisone shot did not help.        HISTORY OF PRESENT ILLNESS    Wendy Francis is a 65 year old female seen for evaluation of ongoing right hip pain with no known injury.   Pain has been present for years, but getting worse. Locates pain in the groin area mostly, but also into the right buttock and down the right thigh to the knee. Pain with walking, weight bearing activities, being active, out of car, in/out of tub, shoes/socks. Occasional night pain. Ok at rest. She's had cortisone injection 12/2019 (Dr Herrera) without relief. Has followed with Dr. Jes Jacobsen in the past. She'd like to discuss surgery via anterior approach today.    She notes her ability to walk has decreased over the recent months due to pain. Basically walking across the street to get her mail is her limit. She denies any noticeable limb length inequality. She states her left hip is ok.    Denies low back pain. Reports occasional numbness and tingling down the leg. She reports lymphedema in the right leg.    Present symptoms: pain anteriorly and posteriorly, pain sharp, shooting, dull/achy , moderate pain.    Pain severity: 5/10  Frequency of symptoms: are constant  Exacerbating Factors: weight bearing, stairs, [id number], in and out of car  Relieving  Factors: rest  Night Pain: occasional  Pain while at rest: No   Associated numbness or tingling: Yes       Other PMH:  has a past medical history of Allergic rhinitis, Atrophic vaginitis, Cervical cancer (H) (4/10/2000), Degenerative joint disease of right hip, Diverticulosis of colon (6/2004), Hypertension goal BP (blood pressure) < 140/90, Liver hemangioma, Lung nodules, Mild persistent asthma, Mild recurrent major depression (H), Nasal polyps, Overactive bladder, Sensorineural hearing losses, Tinnitus, and Varicose vein of leg. She also has no past medical history of Cerebral infarction (H), Congestive heart failure, unspecified, COPD (chronic obstructive pulmonary disease) (H), Depressive disorder, Diabetes (H), Heart disease, History of blood transfusion, or Thyroid disease.  Patient Active Problem List   Diagnosis     Mild persistent asthma     Overactive bladder     History of cervical cancer     CARDIOVASCULAR SCREENING; LDL GOAL LESS THAN 160     Allergic rhinitis     Advanced directives, counseling/discussion     Hypertension goal BP (blood pressure) < 140/90     Nasal polyposis     Lung nodules     Serous cystadenoma of left ovary with borderline malignant features (H)     Degenerative joint disease of right hip     Elevated BP without diagnosis of hypertension       Surgical Hx:  has a past surgical history that includes CORRECT BUNION,SIMPLE; colposcopy,loop electrd cervix excis (2000); sinus surgery; Colonoscopy with CO2 insufflation (N/A, 4/12/2017); Laparoscopic salpingo-oophorectomy (Bilateral, 11/30/2018); and Cystoscopy (N/A, 11/30/2018).    Medications:   Current Outpatient Medications:      albuterol (PROAIR HFA/PROVENTIL HFA/VENTOLIN HFA) 108 (90 Base) MCG/ACT inhaler, Inhale 2 puffs into the lungs every 4 hours as needed for shortness of breath / dyspnea, Disp: 1 Inhaler, Rfl: 4     amLODIPine (NORVASC) 10 MG tablet, Take 1 tablet (10 mg) by mouth daily, Disp: 90 tablet, Rfl: 0      budesonide-formoterol (SYMBICORT) 80-4.5 MCG/ACT Inhaler, USE 2 INHALATIONS TWICE A DAY, Disp: 30.6 g, Rfl: 3     cetirizine (ZYRTEC) 10 MG tablet, Take 1 tablet by mouth daily as needed for allergies., Disp: , Rfl:      COMPOUNDED NON-CONTROLLED SUBSTANCE (CMPD RX) - PHARMACY TO MIX COMPOUNDED MEDICATION, Budesonide 2 mg/L irrigation solution. Rinse with 20 mls in each nostril two times a day, Disp: 80 mL, Rfl: 11     ipratropium - albuterol 0.5 mg/2.5 mg/3 mL (DUONEB) 0.5-2.5 (3) MG/3ML neb solution, Take 1 vial (3 mLs) by nebulization every 6 hours as needed for shortness of breath / dyspnea or wheezing, Disp: 30 vial, Rfl: 3     MULTI-VITAMIN OR, DAILY, Disp: , Rfl:      Omega-3 Fatty Acids (FISH OIL) 500 MG CAPS, Take  by mouth. 1 daily , Disp: , Rfl:      tolterodine ER (DETROL LA) 4 MG 24 hr capsule, Take 1 capsule (4 mg) by mouth daily, Disp: 90 capsule, Rfl: 1     Triamcinolone Acetonide (NASACORT ALLERGY 24HR NA), , Disp: , Rfl:     Current Facility-Administered Medications:      betamethasone acet & sod phos (CELESTONE) injection 6 mg, 6 mg, , , Marquis Herrera MD, 6 mg at 12/19/19 1135     ropivacaine (NAROPIN) injection 2 mL, 2 mL, , , Marquis Herrera MD, 2 mL at 12/19/19 1135    Allergies:   Allergies   Allergen Reactions     Citalopram      Thinking changes     Flonase [Fluticasone Propionate]      Sulfa Drugs Rash     Sulfasalazine Rash       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  reports that she has never smoked. She has never used smokeless tobacco. She reports current alcohol use of about 1.7 standard drinks of alcohol per week. She reports that she does not use drugs.    Family Hx: family history includes Allergies in her brother; Asthma in her father; Cancer in her maternal grandmother; Cerebrovascular Disease in her sister; Coronary Artery Disease in her paternal grandmother; Diabetes in her maternal grandfather; Hypertension in her father; Neurologic Disorder in her sister; Ovarian  "Cancer in her maternal grandmother; Pacemaker in her father.    REVIEW OF SYSTEMS:  10 point ROS neg other than the symptoms noted above in the HPI and PAST MEDICAL HISTORY. Notables,  CONSTITUTIONAL:NEGATIVE for fever, chills, change in weight  INTEGUMENTARY/SKIN: NEGATIVE for worrisome rashes, moles or lesions  MUSCULOSKELETAL:See HPI above  NEURO: NEGATIVE for weakness, dizziness or paresthesias    PHYSICAL EXAM:  Ht 1.702 m (5' 7\")   Wt 59.7 kg (131 lb 11.2 oz)   LMP  (LMP Unknown)   BMI 20.63 kg/m     GENERAL APPEARANCE: healthy, alert, no distress  SKIN: no suspicious lesions or rashes  NEURO: Normal strength and tone, mentation intact and speech normal  PSYCH:  mentation appears normal and affect normal  RESPIRATORY: No increased work of breathing.  LYMPH: no palpable inguinal lymph nodes.    BILATERAL LOWER EXTREMITIES:  Gait: favors the right.  No gross deformities or masses.  No Quad atrophy, strength normal.  Intact sensation deep peroneal nerve, superficial peroneal nerve, med/lat tibial nerve, sural nerve, saphenous nerve  Intact EHL, EDL, TA, FHL, GS, quadriceps hamstrings and hip flexors  Toes warm and well perfused, brisk capillary refill. Palpable 2+ dp pulses.  Bilateral calf soft and nttp or squeeze.  DTRs: achilles 2+, patella 2+.  Edema: 1+  Leg lengths: grossly symmetric at medial malleolus.      BACK EXAM  Lumbar range of motion: flexion to touch toes causes pulling in calf and hamstrings, extension adequate causes anterior \"stretching\", side bend: adequate, bending to the right causes \"pinching\" right hip.  Squat: positive  Seated SLR: negative , bilateral.  Supine SLR: negative , bilateral.  Sciatic Notch tenderness: positive without radiation    LEFT HIP EXAM:      Palpation: Tender:   none  Strength:  Within normal limits.  Special tests:  Irritability (flexion/adduction/internal rotation) mild positive.  Hip range of motion: Internal rotation: 20, External rotation: 50, Flexion " "115      RIGHT HIP EXAM:    Palpation: Tender:   Mild right groin/anterior hip  Strength:  4/5  Special tests:  Irritability (flexion/adduction/internal rotation) positive.  Hip range of motion: Internal rotation: 10, External rotation: 25, Flexion 110, pain with extremes of rotation        X-RAY: AP pelvis and AP/Lateral views right hip from 6/24/2020 were reviewed in clinic today. No obvious fractures or dislocations. There is bone on bone loss of superior right hip joint space with subchondral sclerosis. Prominent superior acetabular subchondral cyst. Marginal osteophytes. Fairly preserved left hip joint space, small marginal osteophytes. Lumbar degenerative changes noted. Metallic pelvic clips.      Impression: 64yo female with chronic right hip pain, advanced primary osteoarthritis.    Plan:   * discussed pain in groin/hip likely from advanced hip arthritis. This is wearing of the cartilage within the hip joint either due to normal \"wear and tear\" or following an injury. Any low back / buttock / radiating pain likely coming from the low back.  *  treatment options for hip arthritis and pain include: do nothing, NSAIDS, activity modification, Physical Therapy, injections, total hip arthroplasty. Risks and benefits of each discussed.   * did discuss patient is a candidate for total hip arthroplasty, and offered total hip arthroplasty to patient. Total hip arthroplasty will only attempt to provide pain relief from hip related arthritis only and not any pain from the low back. Any low back pain likely to continue.  *  Discussed risks of surgery include, but not limited to: bleeding, infection, pain, scar, damage to adjacent structures (e.g. Nerves, blood vessels, bone, cartilage), temporary or permanent nerve damage, recurrence of symptoms, implant dislocation, implant failure, implant infection, unequal limb lengths, stiffness, need for further surgery, blood clots, pulmonary embolism, risks of anesthesia, and " death.  * patient would like to proceed with surgery: RIGHT total hip arthroplasty. Anterior approach  * will arrange RIGHT total hip arthroplasty at a mutual convenience in the near future  * discussed will plan hospitalization overnight, with discharge to home the next day or short term rehabilitation facility, depending on postoperative recovery and progress during hospitalization.  * will plan postoperative deep vein thrombosis prophylaxis x4 weeks.  Additionally, graduated compression stockings x4 weeks, and SCDs while in the hospital.  * postoperative pain control will be oral medications upon discharge from hospital  * postoperative Physical Therapy will be discussed, if needed, at first postoperative visit at 2 weeks  * patient will need longterm prophylactic antibiotics use with dental procedures or other invasive procedures (2 g amoxicilin or 450mg ([id number]) clindamycin) 1 hour prior to dental procedures.  * patient will need preoperative H+P prior to surgery from PCP.  * will see patient 2 weeks postoperative, sooner as needed. Will obtain lowset AP pelvis and lateral right hip xray at that time.    * preop information/scrub/wipes provided today  * covid pcr test order signed and provided to patient today. To be obtained 5 days prior to date of surgery at [facility].    Adolfo Joe M.D., M.S.  Dept. of Orthopaedic Surgery  [facility]    * All questions were addressed and answered prior to discharge from clinic today. The patient acknowledges an understanding of and agreement with the plan set forth during today's visit. Patient was advised to call our office or MyChart us if any further questions arise upon leaving our office today.        Again, thank you for allowing me to participate in the care of your patient.        Sincerely,        Adolfo Joe MD

## 2020-06-29 NOTE — TELEPHONE ENCOUNTER
Type of surgery: right total hip arthroplasty-anterior CPT code 58944  Primary osteoarthritis of right hip [M16.11]  - Primary        Hip pain, chronic, right [M25.551, G89.29]       Location of surgery: [facility]  Date and time of surgery: 8-11-20   8:00am  Surgeon: Dr Joe  Pre-Op Appt Date: 7-28-20  Post-Op Appt Date: 8-24-20   Packet sent out: Yes  Pre-cert/Authorization completed:  Outpatient Authorization    Organization  [facility] - [address] [place], [place] [address]er  [facility]  Transaction ID: [id number] ID: [id number] Date: 2020-06-29  No Authorization Required  Group Number  [id number]  Line of Business  [facility]  Date of Service  2020-08-11  Procedure Code 1  85915    Reference Number  [id number]  Status  NO AUTH REQUIRED    Medical Policy Information or Criteria  IV-107 Hip Arthroplasty (Hip Replacement) and Hip Resurfacing      Date: 06/29/2020    Transaction Type  Inpatient Authorization  Certification/Reference Number  [id number]  Status  Approved  07/13/2020    Sent to [facility] and was received. 08/11/2020

## 2020-07-27 NOTE — PROGRESS NOTES
[facility]  [address] [place] [id number]  [phone]  Dept: [phone]    PRE-OP EVALUATION:  Today's date: 2020    Wendy Francis (: 1955) presents for pre-operative evaluation assessment as requested by Dr. Joe.  She requires evaluation and anesthesia risk assessment prior to undergoing surgery/procedure for treatment of Right hip degenerative joint disease.    Fax number for surgical facility:    Primary Physician: Rosanna Lenz  Type of Anesthesia Anticipated: to be determined    Preop Questionnnaire:  Pre-op Questionnaire 2020   Surgery Location: [facility]   Surgeon: Adolfo Joe M.D. MS   Surgery/Procedure: Hip Replacement - Right   Surgery Date: 2020   Time of Surgery: A.M - not yet scheduled   Where patient plans to recover: At home with family   Have you ever had a heart attack or stroke? No   Have you ever had surgery on your heart or blood vessels, such as a stent placement, a coronary artery bypass, or surgery on an artery in your head, neck, heart, or legs? No   Do you have chest pain with activity? No   Do you have a history of  heart failure? No   Do you currently have a cold, bronchitis or symptoms of other infection? No   Do you have a cough, shortness of breath, or wheezing? No   Do you or anyone in your family have previous history of blood clots? YES - father   Do you or does anyone in your family have a serious bleeding problem such as prolonged bleeding following surgeries or cuts? No   Have you ever had problems with anemia or been told to take iron pills? No   Have you had any abnormal blood loss such as black, tarry or bloody stools, or abnormal vaginal bleeding? No   Have you ever had a blood transfusion? No   Are you willing to have a blood transfusion if it is medically needed before, during, or after your surgery? Yes   Have you or any of your relatives ever had problems with  anesthesia? No   Do you have sleep apnea, excessive snoring or daytime drowsiness? No   Do you have any artifical heart valves or other implanted medical devices like a pacemaker, defibrillator, or continuous glucose monitor? No   Do you have artificial joints? No   Are you allergic to latex? No   Is there any chance that you may be pregnant? No     HPI:   Wendy Francis is a 65 year old female who presents with hip pain. Symptom onset has been gradual, worsening for a time period of years. Severity is described as moderate. Course of her symptoms over time is worsening.    See problem list for active medical problems.  Problems all longstanding and stable, except as noted/documented.  See ROS for pertinent symptoms related to these conditions.      MEDICAL HISTORY:     Patient Active Problem List    Diagnosis Date Noted     Lung nodules      Priority: High     Hypertension goal BP (blood pressure) < 140/90      Priority: High     Mild persistent asthma      Priority: High     Degenerative joint disease of right hip      Priority: Medium     Nasal polyposis 02/07/2013     Priority: Medium     History of cervical cancer 09/23/2010     Priority: Medium     2000       Overactive bladder      Priority: Medium     Advanced directives, counseling/discussion 08/18/2011     Priority: Low     Advanced directive materials discussed and given to patient.         Allergic rhinitis      Priority: Low     molds, dust; claritin ineffective       CARDIOVASCULAR SCREENING; LDL GOAL LESS THAN 160 10/31/2010     Priority: Low      Past Medical History:   Diagnosis Date     Allergic rhinitis     molds, dust     Atrophic vaginitis      Cervical cancer (H) 4/10/2000    chemo and radiation, IB-II squamous cell, positive nodes     Degenerative joint disease of right hip      Diverticulosis of colon 6/2004     Hypertension goal BP (blood pressure) < 140/90      Liver hemangioma      Lung nodules      Mild persistent asthma      Mild  recurrent major depression (H)      Nasal polyps      Overactive bladder      Sensorineural hearing losses      Tinnitus      Varicose vein of leg     No surgery     Past Surgical History:   Procedure Laterality Date     COLONOSCOPY WITH CO2 INSUFFLATION N/A 4/12/2017    Procedure: COLONOSCOPY WITH CO2 INSUFFLATION;  Surgeon: Anatoly Do MD;  Location:  OR     COLPOSCOPY,LOOP ELECTRD CERVIX EXCIS  2000    Radiation & chemotherapy for cervical cancer. Diagnosed in 2000     CYSTOSCOPY N/A 11/30/2018    Procedure: CYSTOSCOPY;  Surgeon: Genesis Hicks MD;  Location: [facility] OR      CORRECT BUNION,SIMPLE      ESTEPHANIE     LAPAROSCOPIC SALPINGO-OOPHORECTOMY Bilateral 11/30/2018    Procedure: Laparoscopic Bilateral Salpingo-Oophorectomy, Pelvic washings, cystoscopy;  Surgeon: Genesis Hicks MD;  Location: [facility] OR     SINUS SURGERY      x 2, Nasal polyps removed     Current Outpatient Medications   Medication Sig Dispense Refill     albuterol (PROAIR HFA/PROVENTIL HFA/VENTOLIN HFA) 108 (90 Base) MCG/ACT inhaler Inhale 2 puffs into the lungs every 4 hours as needed for shortness of breath / dyspnea 1 Inhaler 4     amLODIPine (NORVASC) 10 MG tablet Take 1 tablet (10 mg) by mouth daily 90 tablet 3     budesonide-formoterol (SYMBICORT) 80-4.5 MCG/ACT Inhaler USE 2 INHALATIONS TWICE A DAY 30.6 g 3     cetirizine (ZYRTEC) 10 MG tablet Take 1 tablet by mouth daily as needed for allergies.       COMPOUNDED NON-CONTROLLED SUBSTANCE (CMPD RX) - PHARMACY TO MIX COMPOUNDED MEDICATION Budesonide 2 mg/L irrigation solution. Rinse with 20 mls in each nostril two times a day 80 mL 11     ipratropium - albuterol 0.5 mg/2.5 mg/3 mL (DUONEB) 0.5-2.5 (3) MG/3ML neb solution Take 1 vial (3 mLs) by nebulization every 6 hours as needed for shortness of breath / dyspnea or wheezing 30 vial 3     MULTI-VITAMIN OR DAILY       Omega-3 Fatty Acids (FISH OIL) 500 MG CAPS Take  by mouth. 1 daily         tolterodine ER (DETROL LA) 4 MG 24 hr  capsule Take 1 capsule (4 mg) by mouth daily 90 capsule 3     Triamcinolone Acetonide (NASACORT ALLERGY 24HR NA)        OTC products: none    Allergies   Allergen Reactions     Citalopram      Thinking changes     Flonase [Fluticasone Propionate]      Sulfa Drugs Rash     Sulfasalazine Rash      Latex Allergy: NO    Social History     Tobacco Use     Smoking status: Never Smoker     Smokeless tobacco: Never Used   Substance Use Topics     Alcohol use: Yes     Alcohol/week: 1.7 standard drinks     Comment: 1-2 a month     History   Drug Use No     Family History   Problem Relation Age of Onset     Cancer Maternal Grandmother         pelvic     Ovarian Cancer Maternal Grandmother      Neurologic Disorder Sister         brain aneurysm     Hypertension Father      Asthma Father      Pacemaker Father      Diabetes Maternal Grandfather      Allergies Brother      Coronary Artery Disease Paternal Grandmother      Cerebrovascular Disease Sister      C.A.D. No family hx of        REVIEW OF SYSTEMS:   CONSTITUTIONAL: NEGATIVE for fever, chills, change in weight  INTEGUMENTARY/SKIN: NEGATIVE for worrisome rashes, moles or lesions  EYES: NEGATIVE for vision changes or irritation  ENT/MOUTH: NEGATIVE for ear, mouth and throat problems  RESP: NEGATIVE for significant cough or SOB  CV: NEGATIVE for chest pain, palpitations or peripheral edema  GI: NEGATIVE for nausea, abdominal pain, heartburn, or change in bowel habits  : NEGATIVE for frequency, dysuria, or hematuria  MUSCULOSKELETAL: see HPI   NEURO: NEGATIVE for weakness, dizziness or paresthesias  ENDOCRINE: NEGATIVE for temperature intolerance, skin/hair changes  HEME: NEGATIVE for bleeding problems  PSYCHIATRIC: NEGATIVE for changes in mood or affect  Constitutional, neuro, ENT, endocrine, pulmonary, cardiac, gastrointestinal, genitourinary, musculoskeletal, integument and psychiatric systems are negative, except as otherwise noted.    EXAM:   /62   Pulse 84   Temp  "98.4  F (36.9  C) (Oral)   Resp 18   Ht 1.702 m (5' 7\")   Wt 60.1 kg (132 lb 8 oz)   LMP  (LMP Unknown)   SpO2 92%   BMI 20.75 kg/m      GENERAL APPEARANCE: healthy, alert and no distress     EYES: EOMI, PERRL     HENT: ear canals and TM's normal and nose and mouth without ulcers or lesions     NECK: no adenopathy, no asymmetry, masses, or scars and thyroid normal to palpation     RESP: lungs clear to auscultation - no rales, rhonchi or wheezes     CV: regular rates and rhythm, normal S1 S2, no S3 or S4 and no murmur, click or rub     ABDOMEN:  soft, nontender, no HSM or masses and bowel sounds normal     MS: extremities normal- no gross deformities noted, no evidence of inflammation in joints, FROM in all extremities.     SKIN: no suspicious lesions or rashes     NEURO: Normal strength and tone, sensory exam grossly normal, mentation intact and speech normal     PSYCH: mentation appears normal. and affect normal/bright     LYMPHATICS: No cervical adenopathy    DIAGNOSTICS:   EKG: appears normal, NSR, normal axis, normal intervals, no acute ST/T changes c/w ischemia, no LVH by voltage criteria, occasional PVC noted, unifocal    Recent Labs   Lab Test 11/30/18  0615 11/21/18  0831   HGB 13.2 13.9   POTASSIUM 3.4  --    CR 0.89  --         IMPRESSION:   Reason for surgery/procedure: hip degenerative joint disease   Diagnosis/reason for consult: hypertension, asthma     The proposed surgical procedure is considered INTERMEDIATE risk.    REVISED CARDIAC RISK INDEX  The patient has the following serious cardiovascular risks for perioperative complications such as (MI, PE, VFib and 3  AV Block):  No serious cardiac risks  INTERPRETATION: 0 risks: Class I (very low risk - 0.4% complication rate)    The patient has the following additional risks for perioperative complications:  No identified additional risks      ICD-10-CM    1. Preop general physical exam  Z01.818 EKG 12-lead complete w/read - Clinics     Hemoglobin " with Reflex to Iron Studies   2. Primary osteoarthritis of right hip  M16.11    3. Hypertension goal BP (blood pressure) < 140/90  I10 amLODIPine (NORVASC) 10 MG tablet   4. Overactive bladder  N32.81 tolterodine ER (DETROL LA) 4 MG 24 hr capsule   5. Lung nodules  R91.8 CT Chest w/o Contrast       RECOMMENDATIONS:   --Patient is to take all scheduled medications on the day of surgery EXCEPT for modifications listed below.    APPROVAL GIVEN to proceed with proposed procedure, without further diagnostic evaluation       Signed Electronically by: Rosanna Lenz MD    Copy of this evaluation report is provided to requesting physician.    [facility] Preop Guidelines    Revised Cardiac Risk Index

## 2020-07-27 NOTE — PATIENT INSTRUCTIONS
Before Your Surgery      Call your surgeon if there is any change in your health. This includes signs of a cold or flu (such as a sore throat, runny nose, cough, rash or fever).    Do not smoke, drink alcohol or take over the counter medicine (unless your surgeon or primary care doctor tells you to) for the 24 hours before and after surgery.    If you take prescribed drugs: Follow your doctor s orders about which medicines to take and which to stop until after surgery.    Eating and drinking prior to surgery: follow the instructions from your surgeon    Take a shower or bath the night before surgery. Use the soap your surgeon gave you to gently clean your skin. If you do not have soap from your surgeon, use your regular soap. Do not shave or scrub the surgery site.  Wear clean pajamas and have clean sheets on your bed.         Call the imaging center at [phone] or  [phone] to schedule your CT for follow-up of lung nodules.

## 2020-07-28 ENCOUNTER — OFFICE VISIT (OUTPATIENT)
Dept: FAMILY MEDICINE | Facility: CLINIC | Age: 65
End: 2020-07-28
Payer: COMMERCIAL

## 2020-07-28 VITALS
SYSTOLIC BLOOD PRESSURE: 110 MMHG | HEIGHT: 67 IN | TEMPERATURE: 98.4 F | BODY MASS INDEX: 20.8 KG/M2 | HEART RATE: 84 BPM | DIASTOLIC BLOOD PRESSURE: 62 MMHG | OXYGEN SATURATION: 92 % | RESPIRATION RATE: 18 BRPM | WEIGHT: 132.5 LBS

## 2020-07-28 DIAGNOSIS — N32.81 OVERACTIVE BLADDER: ICD-10-CM

## 2020-07-28 DIAGNOSIS — I10 HYPERTENSION GOAL BP (BLOOD PRESSURE) < 140/90: ICD-10-CM

## 2020-07-28 DIAGNOSIS — Z01.818 PREOP GENERAL PHYSICAL EXAM: Primary | ICD-10-CM

## 2020-07-28 DIAGNOSIS — R91.8 LUNG NODULES: ICD-10-CM

## 2020-07-28 DIAGNOSIS — M16.11 PRIMARY OSTEOARTHRITIS OF RIGHT HIP: ICD-10-CM

## 2020-07-28 PROBLEM — C56.2: Status: RESOLVED | Noted: 2019-12-09 | Resolved: 2020-07-28

## 2020-07-28 PROBLEM — R03.0 ELEVATED BP WITHOUT DIAGNOSIS OF HYPERTENSION: Status: RESOLVED | Noted: 2019-12-10 | Resolved: 2020-07-28

## 2020-07-28 PROCEDURE — 99214 OFFICE O/P EST MOD 30 MIN: CPT | Performed by: FAMILY MEDICINE

## 2020-07-28 PROCEDURE — 93000 ELECTROCARDIOGRAM COMPLETE: CPT | Performed by: FAMILY MEDICINE

## 2020-07-28 RX ORDER — TOLTERODINE 4 MG/1
4 CAPSULE, EXTENDED RELEASE ORAL DAILY
Qty: 90 CAPSULE | Refills: 3 | Status: SHIPPED | OUTPATIENT
Start: 2020-07-28 | End: 2021-07-27

## 2020-07-28 RX ORDER — AMLODIPINE BESYLATE 10 MG/1
10 TABLET ORAL DAILY
Qty: 90 TABLET | Refills: 3 | Status: SHIPPED | OUTPATIENT
Start: 2020-07-28 | End: 2021-07-27

## 2020-07-28 ASSESSMENT — MIFFLIN-ST. JEOR: SCORE: 1183.65

## 2020-07-28 ASSESSMENT — PAIN SCALES - GENERAL: PAINLEVEL: NO PAIN (0)

## 2020-07-28 NOTE — LETTER
August 6, 2020      Wendy A Tito  [address] [place] [id number]          Dear antonioavel,    We are writing to inform you of your test results.  Your lab results are completely normal at this time.      Resulted Orders   Hemoglobin with Reflex to Iron Studies   Result Value Ref Range    Hemoglobin TOO OLD TO TEST 11.7 - 15.7 g/dL      Comment:      CORRECTED ON 08/06 AT 0701: PREVIOUSLY REPORTED AS 14.6    Iron Study JIC Tube TOO OLD TO TEST        If you have any questions or concerns, please call the clinic at the number listed above.       Sincerely,        Isaiah Donis MD

## 2020-07-28 NOTE — LETTER
August 6, 2020      Wendy A Tito  [address] [place] [id number]          Dear ,    We are writing to inform you of your test results.  Your lab results are completely normal at this time.       Resulted Orders   Hemoglobin with Reflex to Iron Studies   Result Value Ref Range    Hemoglobin TOO OLD TO TEST 11.7 - 15.7 g/dL      Comment:      CORRECTED ON 08/06 AT 0701: PREVIOUSLY REPORTED AS 14.6    Iron Study JIC Tube TOO OLD TO TEST        If you have any questions or concerns, please call the clinic at the number listed above.       Sincerely,        Rosanna Lenz MD

## 2020-07-29 ASSESSMENT — ASTHMA QUESTIONNAIRES: ACT_TOTALSCORE: 23

## 2020-08-06 LAB
HGB BLD-MCNC: NORMAL G/DL (ref 11.7–15.7)
IRON STUDY JIC TUBE: NORMAL

## 2020-08-24 ENCOUNTER — OFFICE VISIT (OUTPATIENT)
Dept: ORTHOPEDICS | Facility: CLINIC | Age: 65
End: 2020-08-24

## 2020-08-24 ENCOUNTER — ANCILLARY PROCEDURE (OUTPATIENT)
Dept: GENERAL RADIOLOGY | Facility: CLINIC | Age: 65
End: 2020-08-24
Attending: PHYSICIAN ASSISTANT
Payer: COMMERCIAL

## 2020-08-24 VITALS — HEIGHT: 67 IN | WEIGHT: 131.8 LBS | BODY MASS INDEX: 20.69 KG/M2

## 2020-08-24 DIAGNOSIS — Z96.641 STATUS POST TOTAL REPLACEMENT OF RIGHT HIP: Primary | ICD-10-CM

## 2020-08-24 DIAGNOSIS — Z96.641 STATUS POST TOTAL REPLACEMENT OF RIGHT HIP: ICD-10-CM

## 2020-08-24 PROCEDURE — 99024 POSTOP FOLLOW-UP VISIT: CPT | Performed by: ORTHOPAEDIC SURGERY

## 2020-08-24 PROCEDURE — 73502 X-RAY EXAM HIP UNI 2-3 VIEWS: CPT

## 2020-08-24 ASSESSMENT — MIFFLIN-ST. JEOR: SCORE: 1180.47

## 2020-08-24 ASSESSMENT — PAIN SCALES - GENERAL: PAINLEVEL: MILD PAIN (2)

## 2020-08-24 NOTE — PROGRESS NOTES
CHIEF COMPLAINT:   Chief Complaint   Patient presents with     Right Hip - Total Joint Post-op     Right total hip arthroplasty. DOS: 8/11/20. 2 weeks p/o. Doing well, satisfied thus far. Walking assisted with a single crutch under her left arm.          SURGERY: Total hip arthroplasty, right hip, anterior approach ([facility])  DATE OF SURGERY: 8/11/2020      HISTORY OF PRESENT ILLNESS: Wendy Francis is a 65 year old female seen for postoperative evaluation of right total hip arthroplasty. It has been 2 weeks since surgery. Returns today stating overall doing well. Denies any wound problems. Denies numbness, tingling, or weakness in the affected extremity. Denies fevers chills night sweats. Continues to take aspirin for anti-coagulation for deep vein thrombosis prophylaxis. Use of pain medications has been none.  Concerns today include: none. She is pleased so far.    Present symptoms: mild pain.    Pain severity: 2/10        PAST MEDICAL HISTORY:   Past Medical History:   Diagnosis Date     Allergic rhinitis     molds, dust     Atrophic vaginitis      Cervical cancer (H) 4/10/2000    chemo and radiation, IB-II squamous cell, positive nodes     Degenerative joint disease of right hip      Diverticulosis of colon 6/2004     Hypertension goal BP (blood pressure) < 140/90      Liver hemangioma      Lung nodules      Mild persistent asthma      Mild recurrent major depression (H)      Nasal polyps      Overactive bladder      Sensorineural hearing losses      Tinnitus      Varicose vein of leg     No surgery     Patient Active Problem List   Diagnosis     Mild persistent asthma     Overactive bladder     History of cervical cancer     CARDIOVASCULAR SCREENING; LDL GOAL LESS THAN 160     Allergic rhinitis     Advanced directives, counseling/discussion     Hypertension goal BP (blood pressure) < 140/90     Nasal polyposis     Lung nodules     Degenerative joint disease of right hip       PAST SURGICAL  HISTORY:   Past Surgical History:   Procedure Laterality Date     COLONOSCOPY WITH CO2 INSUFFLATION N/A 4/12/2017    Procedure: COLONOSCOPY WITH CO2 INSUFFLATION;  Surgeon: Anatoly Do MD;  Location:  OR     COLPOSCOPY,LOOP ELECTRD CERVIX EXCIS  2000    Radiation & chemotherapy for cervical cancer. Diagnosed in 2000     CYSTOSCOPY N/A 11/30/2018    Procedure: CYSTOSCOPY;  Surgeon: Genesis Hicks MD;  Location: [facility] OR      CORRECT BUNION,SIMPLE      ESTEPHANIE     LAPAROSCOPIC SALPINGO-OOPHORECTOMY Bilateral 11/30/2018    Procedure: Laparoscopic Bilateral Salpingo-Oophorectomy, Pelvic washings, cystoscopy;  Surgeon: Genesis Hicks MD;  Location: [facility] OR     SINUS SURGERY      x 2, Nasal polyps removed       Medications:   Current Outpatient Medications:      albuterol (PROAIR HFA/PROVENTIL HFA/VENTOLIN HFA) 108 (90 Base) MCG/ACT inhaler, Inhale 2 puffs into the lungs every 4 hours as needed for shortness of breath / dyspnea, Disp: 1 Inhaler, Rfl: 4     amLODIPine (NORVASC) 10 MG tablet, Take 1 tablet (10 mg) by mouth daily, Disp: 90 tablet, Rfl: 3     budesonide-formoterol (SYMBICORT) 80-4.5 MCG/ACT Inhaler, USE 2 INHALATIONS TWICE A DAY, Disp: 30.6 g, Rfl: 3     cetirizine (ZYRTEC) 10 MG tablet, Take 1 tablet by mouth daily as needed for allergies., Disp: , Rfl:      COMPOUNDED NON-CONTROLLED SUBSTANCE (CMPD RX) - PHARMACY TO MIX COMPOUNDED MEDICATION, Budesonide 2 mg/L irrigation solution. Rinse with 20 mls in each nostril two times a day, Disp: 80 mL, Rfl: 11     ipratropium - albuterol 0.5 mg/2.5 mg/3 mL (DUONEB) 0.5-2.5 (3) MG/3ML neb solution, Take 1 vial (3 mLs) by nebulization every 6 hours as needed for shortness of breath / dyspnea or wheezing, Disp: 30 vial, Rfl: 3     MULTI-VITAMIN OR, DAILY, Disp: , Rfl:      Omega-3 Fatty Acids (FISH OIL) 500 MG CAPS, Take  by mouth. 1 daily , Disp: , Rfl:      tolterodine ER (DETROL LA) 4 MG 24 hr capsule, Take 1 capsule (4 mg) by mouth daily, Disp: 90  "capsule, Rfl: 3     Triamcinolone Acetonide (NASACORT ALLERGY 24HR NA), , Disp: , Rfl:     Allergies:   Allergies   Allergen Reactions     Citalopram      Thinking changes     Flonase [Fluticasone Propionate]      Sulfa Drugs Rash     Sulfasalazine Rash         REVIEW OF SYSTEMS:  CONSTITUTIONAL:NEGATIVE for fever, chills, night sweats, change in weight  INTEGUMENTARY/SKIN: NEGATIVE for worrisome rashes, moles or lesions  MUSCULOSKELETAL:See HPI above  NEURO: NEGATIVE for weakness, dizziness or paresthesias    PHYSICAL EXAM:  Ht 1.702 m (5' 7\")   Wt 59.8 kg (131 lb 12.8 oz)   LMP  (LMP Unknown)   BMI 20.64 kg/m     GENERAL APPEARANCE: healthy, alert, no distress  SKIN: no suspicious lesions or rashes  NEURO: Normal strength and tone, mentation intact and speech normal  PSYCH:  mentation appears normal and affect normal/bright  RESPIRATORY: No increased work of breathing.    BILATERAL LOWER EXTREMITIES:  Gait: slight favors the right using single crutch on the left.    Bilateral calf soft and nttp or squeeze.    right lower extremity:  Intact sensation deep peroneal nerve, superficial peroneal nerve, med/lat tibial nerve, sural nerve, saphenous nerve  Intact EHL, EDL, TA, FHL, GS, quadriceps hamstrings and hip flexors  Toes warm and well perfused, brisk capillary refill. Palpable 2+ dp pulses.  Edema: trace    right HIP EXAM:    Incision: healing well. Dried skin glue in place. No erythema. No induration. Dry without drainage.  Palpation: Tender:   Mild tender to palpation around incision.  Strength:  4/5  Special tests:  Irritability (flexion/adduction/internal rotation) negative.  Hip range of motion: normal, all pain free  Leg lengths: grossly symmetric at medial malleolus.      X-RAY:  AP pelvis, lateral views right hip from 8/24/2020 were reviewed in clinic today. No obvious fractures or dislocations. Hip arthroplasty components in place without obvious failure, complication, fracture, or " dislocation.      Impression: 65 year old female seen for postoperative evaluation of right total hip arthroplasty. It has been 2 weeks since surgery. Doing well.      Plan:   * reviewed xrays with patient today showing total hip arthroplasty implants.  * continue DVT prophylaxis for a total of 4 weeks postoperative  * wean off all pain medications as tolerated  * xrays next visit: lowset AP pelvis and lateral hip  * return to clinic 4 weeks   * all questions addressed and answered prior to discharge from clinic today to patient's satisfaction.  * patient will need longterm prophylactic antibiotics use with dental procedures or other invasive procedures (2 g amoxicilin or 450mg ([id number]) clindamycin) 1 hour prior to dental procedures.        Adolfo Joe M.D., M.S.  Dept. of Orthopaedic Surgery  [facility]

## 2020-08-24 NOTE — LETTER
8/24/2020         RE: Wendy Francis  [address] [facility] [id number]        Dear Colleague,    Thank you for referring your patient, Wendy Francis, to the [facility]. Please see a copy of my visit note below.      CHIEF COMPLAINT:   Chief Complaint   Patient presents with     Right Hip - Total Joint Post-op     Right total hip arthroplasty. DOS: 8/11/20. 2 weeks p/o. Doing well, satisfied thus far. Walking assisted with a single crutch under her left arm.          SURGERY: Total hip arthroplasty, right hip, anterior approach ([facility])  DATE OF SURGERY: 8/11/2020      HISTORY OF PRESENT ILLNESS: Wendy Francis is a 65 year old female seen for postoperative evaluation of right total hip arthroplasty. It has been 2 weeks since surgery. Returns today stating overall doing well. Denies any wound problems. Denies numbness, tingling, or weakness in the affected extremity. Denies fevers chills night sweats. Continues to take aspirin for anti-coagulation for deep vein thrombosis prophylaxis. Use of pain medications has been none.  Concerns today include: none. She is pleased so far.    Present symptoms: mild pain.    Pain severity: 2/10        PAST MEDICAL HISTORY:   Past Medical History:   Diagnosis Date     Allergic rhinitis     molds, dust     Atrophic vaginitis      Cervical cancer (H) 4/10/2000    chemo and radiation, IB-II squamous cell, positive nodes     Degenerative joint disease of right hip      Diverticulosis of colon 6/2004     Hypertension goal BP (blood pressure) < 140/90      Liver hemangioma      Lung nodules      Mild persistent asthma      Mild recurrent major depression (H)      Nasal polyps      Overactive bladder      Sensorineural hearing losses      Tinnitus      Varicose vein of leg     No surgery     Patient Active Problem List   Diagnosis     Mild persistent asthma     Overactive bladder     History of cervical cancer      CARDIOVASCULAR SCREENING; LDL GOAL LESS THAN 160     Allergic rhinitis     Advanced directives, counseling/discussion     Hypertension goal BP (blood pressure) < 140/90     Nasal polyposis     Lung nodules     Degenerative joint disease of right hip       PAST SURGICAL HISTORY:   Past Surgical History:   Procedure Laterality Date     COLONOSCOPY WITH CO2 INSUFFLATION N/A 4/12/2017    Procedure: COLONOSCOPY WITH CO2 INSUFFLATION;  Surgeon: Anatoly Do MD;  Location:  OR     COLPOSCOPY,LOOP ELECTRD CERVIX EXCIS  2000    Radiation & chemotherapy for cervical cancer. Diagnosed in 2000     CYSTOSCOPY N/A 11/30/2018    Procedure: CYSTOSCOPY;  Surgeon: Genesis Hicks MD;  Location: [facility] OR      CORRECT BUNION,SIMPLE      ESTEPHANIE     LAPAROSCOPIC SALPINGO-OOPHORECTOMY Bilateral 11/30/2018    Procedure: Laparoscopic Bilateral Salpingo-Oophorectomy, Pelvic washings, cystoscopy;  Surgeon: Genesis Hicks MD;  Location: [facility] OR     SINUS SURGERY      x 2, Nasal polyps removed       Medications:   Current Outpatient Medications:      albuterol (PROAIR HFA/PROVENTIL HFA/VENTOLIN HFA) 108 (90 Base) MCG/ACT inhaler, Inhale 2 puffs into the lungs every 4 hours as needed for shortness of breath / dyspnea, Disp: 1 Inhaler, Rfl: 4     amLODIPine (NORVASC) 10 MG tablet, Take 1 tablet (10 mg) by mouth daily, Disp: 90 tablet, Rfl: 3     budesonide-formoterol (SYMBICORT) 80-4.5 MCG/ACT Inhaler, USE 2 INHALATIONS TWICE A DAY, Disp: 30.6 g, Rfl: 3     cetirizine (ZYRTEC) 10 MG tablet, Take 1 tablet by mouth daily as needed for allergies., Disp: , Rfl:      COMPOUNDED NON-CONTROLLED SUBSTANCE (CMPD RX) - PHARMACY TO MIX COMPOUNDED MEDICATION, Budesonide 2 mg/L irrigation solution. Rinse with 20 mls in each nostril two times a day, Disp: 80 mL, Rfl: 11     ipratropium - albuterol 0.5 mg/2.5 mg/3 mL (DUONEB) 0.5-2.5 (3) MG/3ML neb solution, Take 1 vial (3 mLs) by nebulization every 6 hours as needed for shortness of breath / dyspnea  "or wheezing, Disp: 30 vial, Rfl: 3     MULTI-VITAMIN OR, DAILY, Disp: , Rfl:      Omega-3 Fatty Acids (FISH OIL) 500 MG CAPS, Take  by mouth. 1 daily , Disp: , Rfl:      tolterodine ER (DETROL LA) 4 MG 24 hr capsule, Take 1 capsule (4 mg) by mouth daily, Disp: 90 capsule, Rfl: 3     Triamcinolone Acetonide (NASACORT ALLERGY 24HR NA), , Disp: , Rfl:     Allergies:   Allergies   Allergen Reactions     Citalopram      Thinking changes     Flonase [Fluticasone Propionate]      Sulfa Drugs Rash     Sulfasalazine Rash         REVIEW OF SYSTEMS:  CONSTITUTIONAL:NEGATIVE for fever, chills, night sweats, change in weight  INTEGUMENTARY/SKIN: NEGATIVE for worrisome rashes, moles or lesions  MUSCULOSKELETAL:See HPI above  NEURO: NEGATIVE for weakness, dizziness or paresthesias    PHYSICAL EXAM:  Ht 1.702 m (5' 7\")   Wt 59.8 kg (131 lb 12.8 oz)   LMP  (LMP Unknown)   BMI 20.64 kg/m     GENERAL APPEARANCE: healthy, alert, no distress  SKIN: no suspicious lesions or rashes  NEURO: Normal strength and tone, mentation intact and speech normal  PSYCH:  mentation appears normal and affect normal/bright  RESPIRATORY: No increased work of breathing.    BILATERAL LOWER EXTREMITIES:  Gait: slight favors the right using single crutch on the left.    Bilateral calf soft and nttp or squeeze.    right lower extremity:  Intact sensation deep peroneal nerve, superficial peroneal nerve, med/lat tibial nerve, sural nerve, saphenous nerve  Intact EHL, EDL, TA, FHL, GS, quadriceps hamstrings and hip flexors  Toes warm and well perfused, brisk capillary refill. Palpable 2+ dp pulses.  Edema: trace    right HIP EXAM:    Incision: healing well. Dried skin glue in place. No erythema. No induration. Dry without drainage.  Palpation: Tender:   Mild tender to palpation around incision.  Strength:  4/5  Special tests:  Irritability (flexion/adduction/internal rotation) negative.  Hip range of motion: normal, all pain free  Leg lengths: grossly " symmetric at medial malleolus.      X-RAY:  AP pelvis, lateral views right hip from 8/24/2020 were reviewed in clinic today. No obvious fractures or dislocations. Hip arthroplasty components in place without obvious failure, complication, fracture, or dislocation.      Impression: 65 year old female seen for postoperative evaluation of right total hip arthroplasty. It has been 2 weeks since surgery. Doing well.      Plan:   * reviewed xrays with patient today showing total hip arthroplasty implants.  * continue DVT prophylaxis for a total of 4 weeks postoperative  * wean off all pain medications as tolerated  * xrays next visit: lowset AP pelvis and lateral hip  * return to clinic 4 weeks   * all questions addressed and answered prior to discharge from clinic today to patient's satisfaction.  * patient will need longterm prophylactic antibiotics use with dental procedures or other invasive procedures (2 g amoxicilin or 450mg ([id number]) clindamycin) 1 hour prior to dental procedures.        Adolfo Joe M.D., M.S.  Dept. of Orthopaedic Surgery  [facility]          Again, thank you for allowing me to participate in the care of your patient.        Sincerely,        Adolfo Joe MD

## 2020-09-02 ENCOUNTER — TELEPHONE (OUTPATIENT)
Dept: ORTHOPEDICS | Facility: CLINIC | Age: 65
End: 2020-09-02

## 2020-09-02 DIAGNOSIS — Z96.641 STATUS POST TOTAL REPLACEMENT OF RIGHT HIP: Primary | ICD-10-CM

## 2020-09-02 RX ORDER — AMOXICILLIN 500 MG/1
2000 CAPSULE ORAL
Qty: 4 CAPSULE | Refills: 2 | Status: SHIPPED | OUTPATIENT
Start: 2020-09-02 | End: 2021-05-03

## 2020-10-02 DIAGNOSIS — J45.30 MILD PERSISTENT ASTHMA WITHOUT COMPLICATION: ICD-10-CM

## 2020-10-03 RX ORDER — BUDESONIDE AND FORMOTEROL FUMARATE DIHYDRATE 80; 4.5 UG/1; UG/1
AEROSOL RESPIRATORY (INHALATION)
Qty: 30.6 G | Refills: 3 | Status: SHIPPED | OUTPATIENT
Start: 2020-10-03 | End: 2021-03-05

## 2020-12-23 ENCOUNTER — MYC MEDICAL ADVICE (OUTPATIENT)
Dept: FAMILY MEDICINE | Facility: CLINIC | Age: 65
End: 2020-12-23

## 2020-12-23 DIAGNOSIS — J33.9 NASAL POLYPOSIS: ICD-10-CM

## 2020-12-24 NOTE — TELEPHONE ENCOUNTER
Routing refill request to provider for review/approval because:  Drug not on the [facility] refill protocol     Requested Prescriptions   Pending Prescriptions Disp Refills     COMPOUNDED NON-CONTROLLED SUBSTANCE (CMPD RX) - PHARMACY TO MIX COMPOUNDED MEDICATION 80 mL 11     Sig: Budesonide 2 mg/L irrigation solution. Rinse with 20 mls in each nostril two times a day       There is no refill protocol information for this order

## 2021-03-01 ENCOUNTER — TRANSFERRED RECORDS (OUTPATIENT)
Dept: MULTI SPECIALTY CLINIC | Facility: CLINIC | Age: 66
End: 2021-03-01
Payer: COMMERCIAL

## 2021-03-05 ENCOUNTER — OFFICE VISIT (OUTPATIENT)
Dept: FAMILY MEDICINE | Facility: CLINIC | Age: 66
End: 2021-03-05
Payer: COMMERCIAL

## 2021-03-05 VITALS
BODY MASS INDEX: 20.09 KG/M2 | DIASTOLIC BLOOD PRESSURE: 74 MMHG | OXYGEN SATURATION: 96 % | WEIGHT: 128 LBS | HEART RATE: 94 BPM | SYSTOLIC BLOOD PRESSURE: 133 MMHG | TEMPERATURE: 98 F | RESPIRATION RATE: 18 BRPM | HEIGHT: 67 IN

## 2021-03-05 DIAGNOSIS — J45.30 MILD PERSISTENT ASTHMA WITHOUT COMPLICATION: ICD-10-CM

## 2021-03-05 DIAGNOSIS — K62.5 RECTAL BLEEDING: Primary | ICD-10-CM

## 2021-03-05 DIAGNOSIS — K55.039 ACUTE ISCHEMIC COLITIS (H): ICD-10-CM

## 2021-03-05 PROCEDURE — 99213 OFFICE O/P EST LOW 20 MIN: CPT | Performed by: PHYSICIAN ASSISTANT

## 2021-03-05 RX ORDER — BUDESONIDE AND FORMOTEROL FUMARATE DIHYDRATE 80; 4.5 UG/1; UG/1
AEROSOL RESPIRATORY (INHALATION)
Qty: 30.6 G | Refills: 3 | Status: SHIPPED | OUTPATIENT
Start: 2021-03-05 | End: 2021-03-05

## 2021-03-05 RX ORDER — BUDESONIDE AND FORMOTEROL FUMARATE DIHYDRATE 80; 4.5 UG/1; UG/1
AEROSOL RESPIRATORY (INHALATION)
Qty: 30.6 G | Refills: 3 | Status: SHIPPED | OUTPATIENT
Start: 2021-03-05 | End: 2022-03-21

## 2021-03-05 ASSESSMENT — ASTHMA QUESTIONNAIRES
ACT_TOTALSCORE: 22
QUESTION_4 LAST FOUR WEEKS HOW OFTEN HAVE YOU USED YOUR RESCUE INHALER OR NEBULIZER MEDICATION (SUCH AS ALBUTEROL): ONCE A WEEK OR LESS
QUESTION_3 LAST FOUR WEEKS HOW OFTEN DID YOUR ASTHMA SYMPTOMS (WHEEZING, COUGHING, SHORTNESS OF BREATH, CHEST TIGHTNESS OR PAIN) WAKE YOU UP AT NIGHT OR EARLIER THAN USUAL IN THE MORNING: NOT AT ALL
QUESTION_2 LAST FOUR WEEKS HOW OFTEN HAVE YOU HAD SHORTNESS OF BREATH: ONCE OR TWICE A WEEK
QUESTION_1 LAST FOUR WEEKS HOW MUCH OF THE TIME DID YOUR ASTHMA KEEP YOU FROM GETTING AS MUCH DONE AT WORK, SCHOOL OR AT HOME: NONE OF THE TIME
QUESTION_5 LAST FOUR WEEKS HOW WOULD YOU RATE YOUR ASTHMA CONTROL: WELL CONTROLLED

## 2021-03-05 ASSESSMENT — MIFFLIN-ST. JEOR: SCORE: 1163.23

## 2021-03-05 NOTE — PROGRESS NOTES
Assessment & Plan     Acute ischemic colitis (H)    Rectal bleeding    Mild persistent asthma without complication  - budesonide-formoterol (SYMBICORT) 80-4.5 MCG/ACT Inhaler; USE 2 INHALATIONS TWICE A DAY      No follow-ups on file.    Elizabet Villalta PA-C  [facility] SAMIRA Marlow is a 65 year old who presents for the following health issues  accompanied by her self:    [facility]         Hospital Follow-up Visit:    Hospital/Nursing Home/ Rehab Facility: [facility]  Date of Admission: 02/28/2021  Date of Discharge: 03/02/2021  Reason(s) for Admission: Rectal Bleeding      Was your hospitalization related to COVID-19? No   Problems taking medications regularly:  None  Medication changes since discharge: None  Problems adhering to non-medication therapy:  None    Summary of hospitalization:  CareEverywhere information obtained and reviewed  Diagnostic Tests/Treatments reviewed.  Follow up needed: Colonoscopy 5 years  Other Healthcare Providers Involved in Patient s Care:         None  Update since discharge: improved.   Post Discharge Medication Reconciliation: discharge medications reconciled, continue medications without change.  Plan of care communicated with patient              Review of Systems   CONSTITUTIONAL: NEGATIVE for fever, chills, change in weight  INTEGUMENTARY/SKIN: NEGATIVE for worrisome rashes, moles or lesions  EYES: NEGATIVE for vision changes or irritation  ENT/MOUTH: NEGATIVE for ear, mouth and throat problems  RESP: NEGATIVE for significant cough or SOB  BREAST: NEGATIVE for masses, tenderness or discharge  CV: NEGATIVE for chest pain, palpitations or peripheral edema  GI: NEGATIVE for nausea, abdominal pain, heartburn, or change in bowel habits  : NEGATIVE for frequency, dysuria, or hematuria  MUSCULOSKELETAL: NEGATIVE for significant arthralgias or myalgia  NEURO: NEGATIVE for weakness, dizziness or paresthesias  ENDOCRINE: NEGATIVE for temperature  "intolerance, skin/hair changes  HEME: NEGATIVE for bleeding problems  PSYCHIATRIC: NEGATIVE for changes in mood or affect      Objective    /74   Pulse 94   Temp 98  F (36.7  C) (Oral)   Resp 18   Ht 1.702 m (5' 7\")   Wt 58.1 kg (128 lb)   LMP  (LMP Unknown)   SpO2 96%   BMI 20.05 kg/m    Body mass index is 20.05 kg/m .  Physical Exam   GENERAL: healthy, alert and no distress  NECK: no adenopathy, no asymmetry, masses, or scars and thyroid normal to palpation  RESP: lungs clear to auscultation - no rales, rhonchi or wheezes  CV: regular rate and rhythm, normal S1 S2, no S3 or S4, no murmur, click or rub, no peripheral edema and peripheral pulses strong  ABDOMEN: soft, nontender, no hepatosplenomegaly, no masses and bowel sounds normal  MS: no gross musculoskeletal defects noted, no edema                "

## 2021-03-05 NOTE — LETTER
My Asthma Action Plan    Name: Wendy Francis   YOB: 1955  Date: 3/5/2021   My doctor: Elizabet Villalta PA-C   My clinic: [facility]        My Control Medicine: Budesonide + formoterol (Symbicort HFA) -  160/4.5 mcg 2 puffs twice daily  My Rescue Medicine: Albuterol (Proair/Ventolin/Proventil HFA) 2-4 puffs EVERY 4 HOURS as needed. Use a spacer if recommended by your provider.   My Asthma Severity:   Mild Persistent  Know your asthma triggers: See below               GREEN ZONE   Good Control    I feel good    No cough or wheeze    Can work, sleep and play without asthma symptoms       Take your asthma control medicine every day.     1. If exercise triggers your asthma, take your rescue medication    15 minutes before exercise or sports, and    During exercise if you have asthma symptoms  2. Spacer to use with inhaler: If you have a spacer, make sure to use it with your inhaler             YELLOW ZONE Getting Worse  I have ANY of these:    I do not feel good    Cough or wheeze    Chest feels tight    Wake up at night   1. Keep taking your Green Zone medications  2. Start taking your rescue medicine:    every 20 minutes for up to 1 hour. Then every 4 hours for 24-48 hours.  3. If you stay in the Yellow Zone for more than 12-24 hours, contact your doctor.  4. If you do not return to the Green Zone in 12-24 hours or you get worse, start taking your oral steroid medicine if prescribed by your provider.           RED ZONE Medical Alert - Get Help  I have ANY of these:    I feel awful    Medicine is not helping    Breathing getting harder    Trouble walking or talking    Nose opens wide to breathe       1. Take your rescue medicine NOW  2. If your provider has prescribed an oral steroid medicine, start taking it NOW  3. Call your doctor NOW  4. If you are still in the Red Zone after 20 minutes and you have not reached your doctor:    Take your rescue medicine again and    Call  911 or go to the emergency room right away    See your regular doctor within 2 weeks of an Emergency Room or Urgent Care visit for follow-up treatment.          Annual Reminders:  Meet with Asthma Educator,  Flu Shot in the Fall, consider Pneumonia Vaccination for patients with asthma (aged 19 and older).    Pharmacy:    [facility] - A [place] ORDER [facility] [place] - [address] [facility]  [facility] - [facility], [address] PHARMACY #[id number] - [facility] [address] [facility]    Electronically signed by Elizabet Villalta PA-C   Date: 03/05/21                      Asthma Triggers  How To Control Things That Make Your Asthma Worse    Triggers are things that make your asthma worse.  Look at the list below to help you find your triggers and what you can do about them.  You can help prevent asthma flare-ups by staying away from your triggers.      Trigger                                                          What you can do   Cigarette Smoke  Tobacco smoke can make asthma worse. Do not allow smoking in your home, car or around you.  Be sure no one smokes at a child s day care or school.  If you smoke, ask your health care provider for ways to help you quit.  Ask family members to quit too.  Ask your health care provider for a referral to Quit Plan to help you quit smoking, or call 1-175-908-PLAN.     Colds, Flu, Bronchitis  These are common triggers of asthma. Wash your hands often.  Don t touch your eyes, nose or mouth.  Get a flu shot every year.     Dust Mites  These are tiny bugs that live in cloth or carpet. They are too small to see. Wash sheets and blankets in hot water every week.   Encase pillows and mattress in dust mite proof covers.  Avoid having carpet if you can. If you have carpet, vacuum weekly.   Use a dust mask and HEPA vacuum.   Pollen and Outdoor Mold  Some people are allergic to trees, grass, or weed pollen, or  molds. Try to keep your windows closed.  Limit time out doors when pollen count is high.   Ask you health care provider about taking medicine during allergy season.     Animal Dander  Some people are allergic to skin flakes, urine or saliva from pets with fur or feathers. Keep pets with fur or feathers out of your home.    If you can t keep the pet outdoors, then keep the pet out of your bedroom.  Keep the bedroom door closed.  Keep pets off cloth furniture and away from stuffed toys.     Mice, Rats, and Cockroaches   Some people are allergic to the waste from these pests.   Cover food and garbage.  Clean up spills and food crumbs.  Store grease in the refrigerator.   Keep food out of the bedroom.   Indoor Mold  This can be a trigger if your home has high moisture. Fix leaking faucets, pipes, or other sources of water.   Clean moldy surfaces.  Dehumidify basement if it is damp and smelly.   Smoke, Strong Odors, and Sprays  These can reduce air quality. Stay away from strong odors and sprays, such as perfume, powder, hair spray, paints, smoke incense, paint, cleaning products, candles and new carpet.   Exercise or Sports  Some people with asthma have this trigger. Be active!  Ask your doctor about taking medicine before sports or exercise to prevent symptoms.    Warm up for 5-10 minutes before and after sports or exercise.     Other Triggers of Asthma  Cold air:  Cover your nose and mouth with a scarf.  Sometimes laughing or crying can be a trigger.  Some medicines and food can trigger asthma.

## 2021-03-06 ASSESSMENT — ASTHMA QUESTIONNAIRES: ACT_TOTALSCORE: 22

## 2021-04-03 ENCOUNTER — HEALTH MAINTENANCE LETTER (OUTPATIENT)
Age: 66
End: 2021-04-03

## 2021-04-30 DIAGNOSIS — Z96.641 STATUS POST TOTAL REPLACEMENT OF RIGHT HIP: ICD-10-CM

## 2021-05-03 RX ORDER — AMOXICILLIN 500 MG/1
2000 CAPSULE ORAL
Qty: 4 CAPSULE | Refills: 2 | Status: SHIPPED | OUTPATIENT
Start: 2021-05-03 | End: 2022-03-16

## 2021-05-23 ENCOUNTER — HEALTH MAINTENANCE LETTER (OUTPATIENT)
Age: 66
End: 2021-05-23

## 2021-07-25 DIAGNOSIS — N32.81 OVERACTIVE BLADDER: ICD-10-CM

## 2021-07-25 DIAGNOSIS — I10 HYPERTENSION GOAL BP (BLOOD PRESSURE) < 140/90: ICD-10-CM

## 2021-07-27 RX ORDER — TOLTERODINE 4 MG/1
CAPSULE, EXTENDED RELEASE ORAL
Qty: 90 CAPSULE | Refills: 3 | Status: SHIPPED | OUTPATIENT
Start: 2021-07-27 | End: 2022-03-21

## 2021-07-27 RX ORDER — AMLODIPINE BESYLATE 10 MG/1
TABLET ORAL
Qty: 90 TABLET | Refills: 2 | Status: SHIPPED | OUTPATIENT
Start: 2021-07-27 | End: 2022-03-21

## 2021-07-27 NOTE — TELEPHONE ENCOUNTER
Routing refill request to provider for review/approval because:  Labs not current:  creatinine    Creatinine   Date Value Ref Range Status   11/30/2018 0.89 0.52 - 1.04 mg/dL Final              Pending Prescriptions:                       Disp   Refills    amLODIPine (NORVASC) 10 MG tablet [Pharmac*90 tab*3        Sig: TAKE 1 TABLET DAILY    Signed Prescriptions:                        Disp   Refills    tolterodine ER (DETROL LA) 4 MG 24 hr caps*90 cap*3        Sig: TAKE 1 CAPSULE DAILY  Authorizing Provider: CASSANDRA LA  Ordering User: JOSE VALENCIA RN

## 2021-09-12 ENCOUNTER — HEALTH MAINTENANCE LETTER (OUTPATIENT)
Age: 66
End: 2021-09-12

## 2021-12-07 ENCOUNTER — TELEPHONE (OUTPATIENT)
Dept: FAMILY MEDICINE | Facility: CLINIC | Age: 66
End: 2021-12-07
Payer: COMMERCIAL

## 2021-12-07 DIAGNOSIS — J33.9 NASAL POLYPOSIS: ICD-10-CM

## 2021-12-07 DIAGNOSIS — Z12.31 VISIT FOR SCREENING MAMMOGRAM: Primary | ICD-10-CM

## 2021-12-07 DIAGNOSIS — Z78.0 ASYMPTOMATIC POSTMENOPAUSAL STATUS: ICD-10-CM

## 2021-12-07 NOTE — TELEPHONE ENCOUNTER
Received a refill Authorization from [facility] for:    Dudes/Sodiu 2   Dose 20ml into each Nostril two times a day.    Can't find in patient's med list.    Teodora Martinez MA    Routing to Dr. Lenz

## 2022-01-04 DIAGNOSIS — J45.30 MILD PERSISTENT ASTHMA WITHOUT COMPLICATION: ICD-10-CM

## 2022-01-04 DIAGNOSIS — J33.9 NASAL POLYPOSIS: ICD-10-CM

## 2022-01-04 RX ORDER — BUDESONIDE AND FORMOTEROL FUMARATE DIHYDRATE 80; 4.5 UG/1; UG/1
AEROSOL RESPIRATORY (INHALATION)
Qty: 30.6 G | Refills: 3 | Status: CANCELLED | OUTPATIENT
Start: 2022-01-04

## 2022-01-04 NOTE — TELEPHONE ENCOUNTER
Patient is looking for refill on Budesonide Nasal Irrigation (compound) could you please send an approval to [facility].    Thank You,    Shana Quinn    Compounding Pharmacy Technician  [facility]   [address] [place] [id number]   Phone: [phone]  Fax: [phone]

## 2022-01-06 NOTE — TELEPHONE ENCOUNTER
Routing refill request to provider for review/approval because:  Drug not on the [facility] refill protocol     Requested Prescriptions   Pending Prescriptions Disp Refills     COMPOUNDED NON-CONTROLLED SUBSTANCE (CMPD RX) - PHARMACY TO MIX COMPOUNDED MEDICATION 80 mL 0     Sig: Budesonide 2 mg/L irrigation solution. Rinse with 20 mls in each nostril two times a day       There is no refill protocol information for this order          Denise Guo RN on 1/6/2022 at 11:01 AM

## 2022-01-14 ENCOUNTER — ANCILLARY PROCEDURE (OUTPATIENT)
Dept: MAMMOGRAPHY | Facility: CLINIC | Age: 67
End: 2022-01-14
Attending: FAMILY MEDICINE
Payer: COMMERCIAL

## 2022-01-14 ENCOUNTER — ANCILLARY PROCEDURE (OUTPATIENT)
Dept: BONE DENSITY | Facility: CLINIC | Age: 67
End: 2022-01-14
Attending: FAMILY MEDICINE
Payer: COMMERCIAL

## 2022-01-14 DIAGNOSIS — Z78.0 ASYMPTOMATIC POSTMENOPAUSAL STATUS: ICD-10-CM

## 2022-01-14 DIAGNOSIS — Z12.31 VISIT FOR SCREENING MAMMOGRAM: ICD-10-CM

## 2022-01-14 PROCEDURE — 77081 DXA BONE DENSITY APPENDICULR: CPT | Performed by: INTERNAL MEDICINE

## 2022-01-14 PROCEDURE — 77080 DXA BONE DENSITY AXIAL: CPT | Mod: XU | Performed by: INTERNAL MEDICINE

## 2022-01-14 PROCEDURE — 77067 SCR MAMMO BI INCL CAD: CPT | Mod: TC | Performed by: RADIOLOGY

## 2022-01-17 PROBLEM — M81.0 OSTEOPOROSIS: Status: ACTIVE | Noted: 2022-01-17

## 2022-01-17 NOTE — RESULT ENCOUNTER NOTE
Please call patient:  You have thin bones, called osteoporosis. Taking a weekly pill called alendronate can lower risk for broken bones. Follow-up for a wellness visit to discuss more.     We can continue to follow this by repeating the test in 2 to 3 years. I recommend taking calcium 1200 mg daily (split into 2 doses) and vitamin D 1000 units daily, exercising regularly, and preventing falls.      Rosanna Lenz MD

## 2022-02-15 DIAGNOSIS — J33.9 NASAL POLYPOSIS: ICD-10-CM

## 2022-02-15 NOTE — TELEPHONE ENCOUNTER
Routing refill request to provider for review/approval because:  Drug not on the [facility] refill protocol       Requested Prescriptions   Pending Prescriptions Disp Refills     COMPOUNDED NON-CONTROLLED SUBSTANCE (CMPD RX) - PHARMACY TO MIX COMPOUNDED MEDICATION 80 mL 0     Sig: Budesonide 2 mg/L irrigation solution. Rinse with 20 mls in each nostril two times a day       There is no refill protocol information for this order        Denise Guo MSN, BSN, RN on 2/15/2022 at 3:13 PM  [facility]

## 2022-03-04 DIAGNOSIS — J45.30 MILD PERSISTENT ASTHMA WITHOUT COMPLICATION: ICD-10-CM

## 2022-03-07 RX ORDER — BUDESONIDE AND FORMOTEROL FUMARATE DIHYDRATE 80; 4.5 UG/1; UG/1
AEROSOL RESPIRATORY (INHALATION)
Qty: 30.6 G | Refills: 3 | OUTPATIENT
Start: 2022-03-07

## 2022-03-07 NOTE — TELEPHONE ENCOUNTER
Please notify patient that appointment is needed for medication refill and offer to schedule. It has been over a year since pt was seen.

## 2022-03-07 NOTE — TELEPHONE ENCOUNTER
Called patient and scheduled appointment 03/21/2022. Please send refill to pharmacy. Martha Santiago MA

## 2022-03-07 NOTE — TELEPHONE ENCOUNTER
Duplicate request denied.  Prescription already sent to the same requesting pharmacy.    budesonide-formoterol (SYMBICORT) 80-4.5 MCG/ACT Inhaler 30.6 g 3 3/5/2021  No   Sig: USE 2 INHALATIONS TWICE A DAY   Sent to pharmacy as: Budesonide-Formoterol Fumarate 80-4.5 MCG/ACT Inhalation Aerosol (Symbicort)   Class: E-Prescribe   Order: [id number]   E-Prescribing Status: Receipt confirmed by pharmacy (3/5/2021  9:46 AM CST)   Prior authorization: Closed - Prior Authorization not required for patient/medication       [facility] - [place], [place] - [address] [place] [address]     Denise VO RN on 3/7/2022 at 10:21 AM

## 2022-03-16 DIAGNOSIS — Z96.641 STATUS POST TOTAL REPLACEMENT OF RIGHT HIP: ICD-10-CM

## 2022-03-16 RX ORDER — AMOXICILLIN 500 MG/1
2000 CAPSULE ORAL
Qty: 4 CAPSULE | Refills: 2 | Status: SHIPPED | OUTPATIENT
Start: 2022-03-16 | End: 2023-03-31

## 2022-03-16 NOTE — TELEPHONE ENCOUNTER
Patient looking for a refill for dental appt.     Thank you,  Jessi Lynn, PharmD  [facility]  [phone]

## 2022-03-16 NOTE — TELEPHONE ENCOUNTER
Routing refill request to provider for review/approval because:  Drug not on the [facility] refill protocol     Ninfa Lakhani RN  [facility]

## 2022-03-20 ASSESSMENT — ENCOUNTER SYMPTOMS
CHILLS: 0
PARESTHESIAS: 0
HEMATURIA: 0
ABDOMINAL PAIN: 0
SORE THROAT: 0
DYSURIA: 0
BREAST MASS: 0
JOINT SWELLING: 0
WEAKNESS: 0
SHORTNESS OF BREATH: 0
DIZZINESS: 0
CONSTIPATION: 1
HEMATOCHEZIA: 0
HEARTBURN: 0
FEVER: 0
PALPITATIONS: 0
FREQUENCY: 0
EYE PAIN: 0
HEADACHES: 0
NAUSEA: 1
MYALGIAS: 0
COUGH: 0
NERVOUS/ANXIOUS: 0
DIARRHEA: 1
ARTHRALGIAS: 0

## 2022-03-20 ASSESSMENT — ACTIVITIES OF DAILY LIVING (ADL): CURRENT_FUNCTION: NO ASSISTANCE NEEDED

## 2022-03-21 ENCOUNTER — OFFICE VISIT (OUTPATIENT)
Dept: FAMILY MEDICINE | Facility: CLINIC | Age: 67
End: 2022-03-21
Payer: COMMERCIAL

## 2022-03-21 VITALS
DIASTOLIC BLOOD PRESSURE: 71 MMHG | SYSTOLIC BLOOD PRESSURE: 126 MMHG | BODY MASS INDEX: 20.89 KG/M2 | HEART RATE: 68 BPM | WEIGHT: 130 LBS | HEIGHT: 66 IN | OXYGEN SATURATION: 98 % | TEMPERATURE: 97.6 F

## 2022-03-21 DIAGNOSIS — Z00.00 ENCOUNTER FOR MEDICARE ANNUAL WELLNESS EXAM: Primary | ICD-10-CM

## 2022-03-21 DIAGNOSIS — R91.8 LUNG NODULES: ICD-10-CM

## 2022-03-21 DIAGNOSIS — Z85.41 HISTORY OF CERVICAL CANCER: ICD-10-CM

## 2022-03-21 DIAGNOSIS — J33.9 NASAL POLYPOSIS: ICD-10-CM

## 2022-03-21 DIAGNOSIS — Z12.4 SCREENING FOR MALIGNANT NEOPLASM OF CERVIX: ICD-10-CM

## 2022-03-21 DIAGNOSIS — N32.81 OVERACTIVE BLADDER: ICD-10-CM

## 2022-03-21 DIAGNOSIS — B07.8 COMMON WART: ICD-10-CM

## 2022-03-21 DIAGNOSIS — J45.30 MILD PERSISTENT ASTHMA WITHOUT COMPLICATION: ICD-10-CM

## 2022-03-21 DIAGNOSIS — Z13.220 SCREENING FOR HYPERLIPIDEMIA: ICD-10-CM

## 2022-03-21 DIAGNOSIS — R93.89 ABNORMAL CHEST CT: ICD-10-CM

## 2022-03-21 DIAGNOSIS — I10 HYPERTENSION GOAL BP (BLOOD PRESSURE) < 140/90: ICD-10-CM

## 2022-03-21 DIAGNOSIS — L82.0 SEBORRHEIC KERATOSIS, INFLAMED: ICD-10-CM

## 2022-03-21 DIAGNOSIS — M81.0 AGE RELATED OSTEOPOROSIS, UNSPECIFIED PATHOLOGICAL FRACTURE PRESENCE: ICD-10-CM

## 2022-03-21 PROBLEM — K55.039 ACUTE ISCHEMIC COLITIS (H): Status: RESOLVED | Noted: 2021-03-05 | Resolved: 2022-03-21

## 2022-03-21 LAB
ANION GAP SERPL CALCULATED.3IONS-SCNC: 5 MMOL/L (ref 3–14)
BUN SERPL-MCNC: 15 MG/DL (ref 7–30)
CALCIUM SERPL-MCNC: 8.8 MG/DL (ref 8.5–10.1)
CHLORIDE BLD-SCNC: 106 MMOL/L (ref 94–109)
CHOLEST SERPL-MCNC: 219 MG/DL
CO2 SERPL-SCNC: 29 MMOL/L (ref 20–32)
CREAT SERPL-MCNC: 0.82 MG/DL (ref 0.52–1.04)
DEPRECATED CALCIDIOL+CALCIFEROL SERPL-MC: 44 UG/L (ref 20–75)
FASTING STATUS PATIENT QL REPORTED: YES
GFR SERPL CREATININE-BSD FRML MDRD: 79 ML/MIN/1.73M2
GLUCOSE BLD-MCNC: 93 MG/DL (ref 70–99)
HDLC SERPL-MCNC: 87 MG/DL
LDLC SERPL CALC-MCNC: 118 MG/DL
NONHDLC SERPL-MCNC: 132 MG/DL
POTASSIUM BLD-SCNC: 3.8 MMOL/L (ref 3.4–5.3)
PTH-INTACT SERPL-MCNC: 39 PG/ML (ref 18–80)
SODIUM SERPL-SCNC: 140 MMOL/L (ref 133–144)
TRIGL SERPL-MCNC: 69 MG/DL
TSH SERPL DL<=0.005 MIU/L-ACNC: 3.18 MU/L (ref 0.4–4)

## 2022-03-21 PROCEDURE — 36415 COLL VENOUS BLD VENIPUNCTURE: CPT | Performed by: FAMILY MEDICINE

## 2022-03-21 PROCEDURE — G0402 INITIAL PREVENTIVE EXAM: HCPCS | Performed by: FAMILY MEDICINE

## 2022-03-21 PROCEDURE — 99214 OFFICE O/P EST MOD 30 MIN: CPT | Mod: 25 | Performed by: FAMILY MEDICINE

## 2022-03-21 PROCEDURE — 80048 BASIC METABOLIC PNL TOTAL CA: CPT | Performed by: FAMILY MEDICINE

## 2022-03-21 PROCEDURE — 87624 HPV HI-RISK TYP POOLED RSLT: CPT | Performed by: FAMILY MEDICINE

## 2022-03-21 PROCEDURE — G0145 SCR C/V CYTO,THINLAYER,RESCR: HCPCS | Performed by: FAMILY MEDICINE

## 2022-03-21 PROCEDURE — 80061 LIPID PANEL: CPT | Performed by: FAMILY MEDICINE

## 2022-03-21 PROCEDURE — 84443 ASSAY THYROID STIM HORMONE: CPT | Performed by: FAMILY MEDICINE

## 2022-03-21 PROCEDURE — 17110 DESTRUCTION B9 LES UP TO 14: CPT | Performed by: FAMILY MEDICINE

## 2022-03-21 PROCEDURE — 82306 VITAMIN D 25 HYDROXY: CPT | Performed by: FAMILY MEDICINE

## 2022-03-21 PROCEDURE — 83970 ASSAY OF PARATHORMONE: CPT | Performed by: FAMILY MEDICINE

## 2022-03-21 RX ORDER — AMLODIPINE BESYLATE 10 MG/1
10 TABLET ORAL DAILY
Qty: 90 TABLET | Refills: 3 | Status: SHIPPED | OUTPATIENT
Start: 2022-03-21 | End: 2023-03-24

## 2022-03-21 RX ORDER — BUDESONIDE AND FORMOTEROL FUMARATE DIHYDRATE 80; 4.5 UG/1; UG/1
AEROSOL RESPIRATORY (INHALATION)
Qty: 30.6 G | Refills: 3 | Status: SHIPPED | OUTPATIENT
Start: 2022-03-21 | End: 2023-02-27

## 2022-03-21 RX ORDER — ALENDRONATE SODIUM 70 MG/1
70 TABLET ORAL
Qty: 12 TABLET | Refills: 3 | Status: SHIPPED | OUTPATIENT
Start: 2022-03-21 | End: 2023-02-02

## 2022-03-21 RX ORDER — ALBUTEROL SULFATE 90 UG/1
2 AEROSOL, METERED RESPIRATORY (INHALATION) EVERY 4 HOURS PRN
Qty: 18 G | Refills: 11 | Status: SHIPPED | OUTPATIENT
Start: 2022-03-21 | End: 2023-06-26

## 2022-03-21 RX ORDER — IPRATROPIUM BROMIDE AND ALBUTEROL SULFATE 2.5; .5 MG/3ML; MG/3ML
1 SOLUTION RESPIRATORY (INHALATION) EVERY 6 HOURS PRN
Qty: 90 ML | Refills: 4 | Status: SHIPPED | OUTPATIENT
Start: 2022-03-21 | End: 2023-06-26

## 2022-03-21 RX ORDER — TOLTERODINE 4 MG/1
4 CAPSULE, EXTENDED RELEASE ORAL DAILY
Qty: 90 CAPSULE | Refills: 3 | Status: SHIPPED | OUTPATIENT
Start: 2022-03-21 | End: 2023-03-24

## 2022-03-21 ASSESSMENT — ENCOUNTER SYMPTOMS
FEVER: 0
PALPITATIONS: 0
NAUSEA: 1
HEMATOCHEZIA: 0
DYSURIA: 0
SHORTNESS OF BREATH: 0
NERVOUS/ANXIOUS: 0
CHILLS: 0
BREAST MASS: 0
DIZZINESS: 0
PARESTHESIAS: 0
HEMATURIA: 0
COUGH: 0
ABDOMINAL PAIN: 0
HEADACHES: 0
HEARTBURN: 0
FREQUENCY: 0
DIARRHEA: 1
EYE PAIN: 0
SORE THROAT: 0
JOINT SWELLING: 0
ARTHRALGIAS: 0
CONSTIPATION: 1
MYALGIAS: 0
WEAKNESS: 0

## 2022-03-21 ASSESSMENT — ASTHMA QUESTIONNAIRES
QUESTION_5 LAST FOUR WEEKS HOW WOULD YOU RATE YOUR ASTHMA CONTROL: WELL CONTROLLED
ACT_TOTALSCORE: 23
QUESTION_2 LAST FOUR WEEKS HOW OFTEN HAVE YOU HAD SHORTNESS OF BREATH: ONCE OR TWICE A WEEK
QUESTION_1 LAST FOUR WEEKS HOW MUCH OF THE TIME DID YOUR ASTHMA KEEP YOU FROM GETTING AS MUCH DONE AT WORK, SCHOOL OR AT HOME: NONE OF THE TIME
ACT_TOTALSCORE: 23
QUESTION_3 LAST FOUR WEEKS HOW OFTEN DID YOUR ASTHMA SYMPTOMS (WHEEZING, COUGHING, SHORTNESS OF BREATH, CHEST TIGHTNESS OR PAIN) WAKE YOU UP AT NIGHT OR EARLIER THAN USUAL IN THE MORNING: NOT AT ALL
QUESTION_4 LAST FOUR WEEKS HOW OFTEN HAVE YOU USED YOUR RESCUE INHALER OR NEBULIZER MEDICATION (SUCH AS ALBUTEROL): NOT AT ALL

## 2022-03-21 ASSESSMENT — ACTIVITIES OF DAILY LIVING (ADL): CURRENT_FUNCTION: NO ASSISTANCE NEEDED

## 2022-03-21 NOTE — PATIENT INSTRUCTIONS
Call the imaging center at [phone] or  [phone] to schedule your lung CT for follow-up of lung nodules.    Patient Education   Personalized Prevention Plan  You are due for the preventive services outlined below.  Your care team is available to assist you in scheduling these services.  If you have already completed any of these items, please share that information with your care team to update in your medical record.  Health Maintenance Due   Topic Date Due     Cholesterol Lab  06/02/2020     FALL RISK ASSESSMENT  04/10/2021     Asthma Action Plan - yearly  03/05/2022       Understanding USDA MyPlate  The USDA has guidelines to help you make healthy food choices. These are called MyPlate. MyPlate shows the food groups that make up healthy meals using the image of a place setting. Before you eat, think about the healthiest choices for what to put on your plate or in your cup or bowl. To learn more about building a healthy plate, visit www.choosemyplate.gov.    The food groups    Fruits. Any fruit or 100% fruit juice counts as part of the Fruit Group. Fruits may be fresh, canned, frozen, or dried, and may be whole, cut-up, or pureed. Make 1/2 of your plate fruits and vegetables.    Vegetables. Any vegetable or 100% vegetable juice counts as a member of the Vegetable Group. Vegetables may be fresh, frozen, canned, or dried. They can be served raw or cooked and may be whole, cut-up, or mashed. Make 1/2 of your plate fruits and vegetables.    Grains. All foods made from grains are part of the Grains Group. These include wheat, rice, oats, cornmeal, and barley. Grains are often used to make foods such as bread, pasta, oatmeal, cereal, tortillas, and grits. Grains should be no more than 1/4 of your plate. At least half of your grains should be whole grains.    Protein. This group includes meat, poultry, seafood, beans and peas, eggs, processed soy products (such as tofu), nuts (including nut butters), and seeds.  Make protein choices no more than 1/4 of your plate. Meat and poultry choices should be lean or low fat.    Dairy. The Dairy Group includes all fluid milk products and foods made from milk that contain calcium, such as yogurt and cheese. (Foods that have little calcium, such as cream, butter, and cream cheese, are not part of this group.) Most dairy choices should be low-fat or fat-free.    Oils. Oils aren't a food group, but they do contain essential nutrients. However it's important to watch your intake of oils. These are fats that are liquid at room temperature. They include canola, corn, olive, soybean, vegetable, and sunflower oil. Foods that are mainly oil include mayonnaise, certain salad dressings, and soft margarines. You likely already get your daily oil allowance from the foods you eat.  Things to limit  Eating healthy also means limiting these things in your diet:       Salt (sodium). Many processed foods have a lot of sodium. To keep sodium intake down, eat fresh vegetables, meats, poultry, and seafood when possible. Purchase low-sodium, reduced-sodium, or no-salt-added food products at the store. And don't add salt to your meals at home. Instead, season them with herbs and spices such as dill, oregano, cumin, and paprika. Or try adding flavor with lemon or lime zest and juice.    Saturated fat. Saturated fats are most often found in animal products such as beef, pork, and chicken. They are often solid at room temperature, such as butter. To reduce your saturated fat intake, choose leaner cuts of meat and poultry. And try healthier cooking methods such as grilling, broiling, roasting, or baking. For a simple lower-fat swap, use plain nonfat yogurt instead of mayonnaise when making potato salad or macaroni salad.    Added sugars. These are sugars added to foods. They are in foods such as ice cream, candy, soda, fruit drinks, sports drinks, energy drinks, cookies, pastries, jams, and syrups. Cut down on  added sugars by sharing sweet treats with a family member or friend. You can also choose fruit for dessert, and drink water or other unsweetened beverages.     [facility] last reviewed this educational content on 6/1/2020 2000-2021 [employer]. All rights reserved. This information is not intended as a substitute for professional medical care. Always follow your healthcare professional's instructions.          Signs of Hearing Loss      Hearing much better with one ear can be a sign of hearing loss.   Hearing loss is a problem shared by many people. In fact, it is one of the most common health problems, particularly as people age. Most people age 65 and older have some hearing loss. By age 80, almost everyone does. Hearing loss often occurs slowly over the years. So you may not realize your hearing has gotten worse.  Have your hearing checked  Call your healthcare provider if you:    Have to strain to hear normal conversation    Have to watch other people s faces very carefully to follow what they re saying    Need to ask people to repeat what they ve said    Often misunderstand what people are saying    Turn the volume of the television or radio up so high that others complain    Feel that people are mumbling when they re talking to you    Find that the effort to hear leaves you feeling tired and irritated    Notice, when using the phone, that you hear better with one ear than the other  [facility] last reviewed this educational content on 1/1/2020 2000-2021 [employer]. All rights reserved. This information is not intended as a substitute for professional medical care. Always follow your healthcare professional's instructions.

## 2022-03-21 NOTE — LETTER
My Asthma Action Plan    Name: Wendy Francis   YOB: 1955  Date: 3/21/2022   My doctor: Rosanna Lenz MD   My clinic: [facility]        My Control Medicine: Budesonide + formoterol (Symbicort HFA) -  80/4.5 mcg 2 puffs twice daily  My Rescue Medicine: Albuterol (Proair/Ventolin/Proventil HFA) 2-4 puffs EVERY 4 HOURS as needed. Use a spacer if recommended by your provider.   My Asthma Severity:   Mild Persistent  Know your asthma triggers: upper respiratory infections               GREEN ZONE   Good Control    I feel good    No cough or wheeze    Can work, sleep and play without asthma symptoms       Take your asthma control medicine every day.     1. If exercise triggers your asthma, take your rescue medication    15 minutes before exercise or sports, and    During exercise if you have asthma symptoms  2. Spacer to use with inhaler: If you have a spacer, make sure to use it with your inhaler             YELLOW ZONE Getting Worse  I have ANY of these:    I do not feel good    Cough or wheeze    Chest feels tight    Wake up at night   1. Keep taking your Green Zone medications  2. Start taking your rescue medicine:    every 20 minutes for up to 1 hour. Then every 4 hours for 24-48 hours.  3. If you stay in the Yellow Zone for more than 12-24 hours, contact your doctor.  4. If you do not return to the Green Zone in 12-24 hours or you get worse, start taking your oral steroid medicine if prescribed by your provider.           RED ZONE Medical Alert - Get Help  I have ANY of these:    I feel awful    Medicine is not helping    Breathing getting harder    Trouble walking or talking    Nose opens wide to breathe       1. Take your rescue medicine NOW  2. If your provider has prescribed an oral steroid medicine, start taking it NOW  3. Call your doctor NOW  4. If you are still in the Red Zone after 20 minutes and you have not reached your doctor:    Take your rescue medicine again  and    Call 911 or go to the emergency room right away    See your regular doctor within 2 weeks of an Emergency Room or Urgent Care visit for follow-up treatment.          Annual Reminders:  Meet with Asthma Educator,  Flu Shot in the Fall, consider Pneumonia Vaccination for patients with asthma (aged 19 and older).    Pharmacy:    [facility] - A MAIL ORDER [facility] [place] - [address] [facility] [address]  [facility] - [facility] [place] [address] PHARMACY #[id number] - [facility] [address] [facility]    Electronically signed by Rosanna Lenz MD   Date: 03/21/22                      Asthma Triggers  How To Control Things That Make Your Asthma Worse    Triggers are things that make your asthma worse.  Look at the list below to help you find your triggers and what you can do about them.  You can help prevent asthma flare-ups by staying away from your triggers.      Trigger                                                          What you can do   Cigarette Smoke  Tobacco smoke can make asthma worse. Do not allow smoking in your home, car or around you.  Be sure no one smokes at a child s day care or school.  If you smoke, ask your health care provider for ways to help you quit.  Ask family members to quit too.  Ask your health care provider for a referral to Quit Plan to help you quit smoking, or call 1-305-765-PLAN.     Colds, Flu, Bronchitis  These are common triggers of asthma. Wash your hands often.  Don t touch your eyes, nose or mouth.  Get a flu shot every year.     Dust Mites  These are tiny bugs that live in cloth or carpet. They are too small to see. Wash sheets and blankets in hot water every week.   Encase pillows and mattress in dust mite proof covers.  Avoid having carpet if you can. If you have carpet, vacuum weekly.   Use a dust mask and HEPA vacuum.   Pollen and Outdoor Mold  Some people are allergic to trees, grass, or weed pollen, or  molds. Try to keep your windows closed.  Limit time out doors when pollen count is high.   Ask you health care provider about taking medicine during allergy season.     Animal Dander  Some people are allergic to skin flakes, urine or saliva from pets with fur or feathers. Keep pets with fur or feathers out of your home.    If you can t keep the pet outdoors, then keep the pet out of your bedroom.  Keep the bedroom door closed.  Keep pets off cloth furniture and away from stuffed toys.     Mice, Rats, and Cockroaches   Some people are allergic to the waste from these pests.   Cover food and garbage.  Clean up spills and food crumbs.  Store grease in the refrigerator.   Keep food out of the bedroom.   Indoor Mold  This can be a trigger if your home has high moisture. Fix leaking faucets, pipes, or other sources of water.   Clean moldy surfaces.  Dehumidify basement if it is damp and smelly.   Smoke, Strong Odors, and Sprays  These can reduce air quality. Stay away from strong odors and sprays, such as perfume, powder, hair spray, paints, smoke incense, paint, cleaning products, candles and new carpet.   Exercise or Sports  Some people with asthma have this trigger. Be active!  Ask your doctor about taking medicine before sports or exercise to prevent symptoms.    Warm up for 5-10 minutes before and after sports or exercise.     Other Triggers of Asthma  Cold air:  Cover your nose and mouth with a scarf.  Sometimes laughing or crying can be a trigger.  Some medicines and food can trigger asthma.

## 2022-03-21 NOTE — PROGRESS NOTES
"SUBJECTIVE:   Wendy Francis is a 66 year old female  who presents for Preventive Visit.    Patient has been advised of split billing requirements and indicates understanding: Yes  Are you in the first 12 months of your Medicare coverage?  Yes,  Visual Acuity:  Right Eye: 10/10   Left Eye: 10/10  Both Eyes: 10/10    Hypertension well controlled on current medications without side effects, chest pain, or dyspnea. Patient also has asthma, persistent.  Patient has had asthma for years.  Occasional wheezing and shortness of breath are triggered by colds and relieved by albuterol. She also has nasal polyposis previously managed by ENT. Her last DEXA showed osteoporosis, never treated.     She has an irritated mole on her left upper back and a wart on her left thumb.     She also presents for refill of overactive bladder medication. No side effects.     Healthy Habits:     In general, how would you rate your overall health?  Good    Frequency of exercise:  4-5 days/week    Duration of exercise:  45-60 minutes    Do you usually eat at least 4 servings of fruit and vegetables a day, include whole grains    & fiber and avoid regularly eating high fat or \"junk\" foods?  No    Taking medications regularly:  Yes    Medication side effects:  Not applicable    Ability to successfully perform activities of daily living:  No assistance needed    Home Safety:  No safety concerns identified    Hearing Impairment:  Difficulty following a conversation in a noisy restaurant or crowded room    In the past 6 months, have you been bothered by leaking of urine?  No    In general, how would you rate your overall mental or emotional health?  Good      PHQ-2 Total Score: 0    Additional concerns today:  Yes    Do you feel safe in your environment? Yes    Have you ever done Advance Care Planning? (For example, a Health Directive, POLST, or a discussion with a medical provider or your loved ones about your wishes): No, advance care " planning information given to patient to review.  Advanced care planning was discussed at today's visit.       Fall risk  Fallen 2 or more times in the past year?: No  Any fall with injury in the past year?: No    Cognitive Screening   1) Repeat 3 items (Leader, Season, Table)    2) Clock draw: NORMAL  3) 3 item recall: Recalls 3 objects  Results: 3 items recalled: COGNITIVE IMPAIRMENT LESS LIKELY    Mini-CogTM Copyright MANISH Kingsley. Licensed by the author for use in [facility]; reprinted with permission (young@[facility]). All rights reserved.      Do you have sleep apnea, excessive snoring or daytime drowsiness?: no    Reviewed and updated as needed this visit by clinical staff   Tobacco  Allergies  Meds  Problems  Med Hx  Surg Hx  Fam Hx  Soc   Hx        Reviewed and updated as needed this visit by Provider   Tobacco  Allergies  Meds  Problems  Med Hx  Surg Hx  Fam Hx         Social History     Tobacco Use     Smoking status: Never Smoker     Smokeless tobacco: Never Used   Substance Use Topics     Alcohol use: Yes     Alcohol/week: 1.7 standard drinks     Comment: 1-2 a month         Alcohol Use 3/20/2022   Prescreen: >3 drinks/day or >7 drinks/week? No   Prescreen: >3 drinks/day or >7 drinks/week? -           Check moles  Wart on left thumb    Current providers sharing in care for this patient include:   Patient Care Team:  Rosanna Lenz MD as PCP - General (Family Practice)  Elizabet Villalta PA-C as Assigned PCP    The following health maintenance items are reviewed in Epic and correct as of today:  Health Maintenance Due   Topic Date Due     BMP  Never done     LIPID  06/02/2020     FALL RISK ASSESSMENT  04/10/2021     ASTHMA ACTION PLAN  03/05/2022     Patient Active Problem List   Diagnosis     Mild persistent asthma     Overactive bladder     History of cervical cancer     CARDIOVASCULAR SCREENING; LDL GOAL LESS THAN 160     Allergic rhinitis     Advanced directives,  counseling/discussion     Hypertension goal BP (blood pressure) < 140/90     Nasal polyposis     Lung nodules     Degenerative joint disease of right hip     Osteoporosis     Past Surgical History:   Procedure Laterality Date     COLONOSCOPY WITH CO2 INSUFFLATION N/A 4/12/2017    Procedure: COLONOSCOPY WITH CO2 INSUFFLATION;  Surgeon: Anatoly Do MD;  Location:  OR     COLPOSCOPY,LOOP ELECTRD CERVIX EXCIS  2000    Radiation & chemotherapy for cervical cancer. Diagnosed in 2000     CYSTOSCOPY N/A 11/30/2018    Procedure: CYSTOSCOPY;  Surgeon: Genesis Hicks MD;  Location: [facility] OR      CORRECT BUNION,SIMPLE      ESTEPHANIE     LAPAROSCOPIC SALPINGO-OOPHORECTOMY Bilateral 11/30/2018    Procedure: Laparoscopic Bilateral Salpingo-Oophorectomy, Pelvic washings, cystoscopy;  Surgeon: Genesis Hicks MD;  Location: [facility] OR     SINUS SURGERY      x 2, Nasal polyps removed       Social History     Tobacco Use     Smoking status: Never Smoker     Smokeless tobacco: Never Used   Substance Use Topics     Alcohol use: Yes     Alcohol/week: 1.7 standard drinks     Comment: 1-2 a month     Family History   Problem Relation Age of Onset     Cancer Maternal Grandmother         pelvic     Ovarian Cancer Maternal Grandmother      Neurologic Disorder Sister         brain aneurysm     Hypertension Father      Asthma Father      Pacemaker Father      Diabetes Maternal Grandfather      Allergies Brother      Coronary Artery Disease Paternal Grandmother      Cerebrovascular Disease Sister      C.A.D. No family hx of          Current Outpatient Medications   Medication Sig Dispense Refill     albuterol (PROAIR HFA/PROVENTIL HFA/VENTOLIN HFA) 108 (90 Base) MCG/ACT inhaler Inhale 2 puffs into the lungs every 4 hours as needed for shortness of breath / dyspnea 18 g 11     alendronate (FOSAMAX) 70 MG tablet Take 1 tablet (70 mg) by mouth every 7 days 12 tablet 3     amLODIPine (NORVASC) 10 MG tablet Take 1 tablet (10 mg) by mouth daily  90 tablet 3     amoxicillin (AMOXIL) 500 MG capsule Take 4 capsules (2,000 mg) by mouth once as needed (take 30-60 minutes prior to any dental work/cleaning) 4 capsule 2     budesonide-formoterol (SYMBICORT) 80-4.5 MCG/ACT Inhaler USE 2 INHALATIONS TWICE A DAY 30.6 g 3     cetirizine (ZYRTEC) 10 MG tablet Take 1 tablet by mouth daily as needed for allergies.       COMPOUNDED NON-CONTROLLED SUBSTANCE (CMPD RX) - PHARMACY TO MIX COMPOUNDED MEDICATION Budesonide 2 mg/L irrigation solution. Rinse with 20 mls in each nostril two times a day 80 mL 11     ipratropium - albuterol 0.5 mg/2.5 mg/3 mL (DUONEB) 0.5-2.5 (3) MG/3ML neb solution Take 1 vial (3 mLs) by nebulization every 6 hours as needed for shortness of breath / dyspnea or wheezing 90 mL 4     MULTI-VITAMIN OR DAILY       Omega-3 Fatty Acids (FISH OIL) 500 MG CAPS Take  by mouth. 1 daily         tolterodine ER (DETROL LA) 4 MG 24 hr capsule Take 1 capsule (4 mg) by mouth daily 90 capsule 3     Triamcinolone Acetonide (NASACORT ALLERGY 24HR NA)        Allergies   Allergen Reactions     Citalopram      Thinking changes     Flonase [Fluticasone Propionate]      Sulfa Drugs Rash     Sulfasalazine Rash         Breast CA Risk Assessment (FHS-7) 3/20/2022   Do you have a family history of breast, colon, or ovarian cancer? No / Unknown          Mammogram Screening: Recommended mammography every 1-2 years with patient discussion and risk factor consideration  Pertinent mammograms are reviewed under the imaging tab.    Review of Systems   Constitutional: Negative for chills and fever.   HENT: Negative for congestion, ear pain, hearing loss and sore throat.    Eyes: Positive for visual disturbance. Negative for pain.   Respiratory: Negative for cough and shortness of breath.    Cardiovascular: Negative for chest pain, palpitations and peripheral edema.   Gastrointestinal: Positive for constipation, diarrhea and nausea. Negative for abdominal pain, heartburn and  "hematochezia.   Breasts:  Negative for tenderness, breast mass and discharge.   Genitourinary: Negative for dysuria, frequency, genital sores, hematuria, pelvic pain, urgency, vaginal bleeding and vaginal discharge.   Musculoskeletal: Negative for arthralgias, joint swelling and myalgias.   Skin: Negative for rash.   Neurological: Negative for dizziness, weakness, headaches and paresthesias.   Psychiatric/Behavioral: Negative for mood changes. The patient is not nervous/anxious.          OBJECTIVE:   /71 (BP Location: Left arm, Patient Position: Sitting, Cuff Size: Adult Regular)   Pulse 68   Temp 97.6  F (36.4  C) (Oral)   Ht 1.681 m (5' 6.18\")   Wt 59 kg (130 lb)   LMP  (LMP Unknown)   SpO2 98%   BMI 20.87 kg/m   Estimated body mass index is 20.87 kg/m  as calculated from the following:    Height as of this encounter: 1.681 m (5' 6.18\").    Weight as of this encounter: 59 kg (130 lb).  Physical Exam  GENERAL: healthy, alert and no distress  EYES: Eyes grossly normal to inspection, PERRL and conjunctivae and sclerae normal  HENT: ear canals and TM's normal, nose and mouth without ulcers or lesions  NECK: no adenopathy, no asymmetry, masses, or scars and thyroid normal to palpation  RESP: lungs clear to auscultation - no rales, rhonchi or wheezes  CV: regular rate and rhythm, normal S1 S2, no S3 or S4, no murmur, click or rub, no peripheral edema   ABDOMEN: soft, nontender, no hepatosplenomegaly, no masses and bowel sounds normal  MS: no gross musculoskeletal defects noted, no edema  SKIN: no suspicious lesions or rashes, keratoses - seborrheic and wart on left thumb at base of nail border   NEURO: Normal strength and tone, mentation intact and speech normal  PSYCH: mentation appears normal, affect normal/bright    Diagnostic Test Results:  Labs reviewed in Epic    ASSESSMENT / PLAN:   (Z00.00) Encounter for Medicare annual wellness exam  (primary encounter diagnosis)  Plan: Pap screen with HPV - " recommended age 30 - 65         years          (I10) Hypertension goal BP (blood pressure) < 140/90  Comment: Well controlled with medications without side effects.   Plan: amLODIPine (NORVASC) 10 MG tablet, Basic         metabolic panel  (Ca, Cl, CO2, Creat, Gluc, K,         Na, BUN), OFFICE/OUTPT VISIT,EST,LEVL IV          (J45.30) Mild persistent asthma without complication  Comment: Well controlled with medications without side effects.   Plan: albuterol (PROAIR HFA/PROVENTIL HFA/VENTOLIN         HFA) 108 (90 Base) MCG/ACT inhaler,         budesonide-formoterol (SYMBICORT) 80-4.5         MCG/ACT Inhaler, ipratropium - albuterol 0.5         mg/2.5 mg/3 mL (DUONEB) 0.5-2.5 (3) MG/3ML neb         solution, OFFICE/OUTPT VISIT,EST,LEVL IV          (J33.9) Nasal polyposis  Comment: Well controlled with medications without side effects.   Plan: COMPOUNDED NON-CONTROLLED SUBSTANCE (CMPD RX) -        PHARMACY TO MIX COMPOUNDED MEDICATION,         DISCONTINUED: COMPOUNDED NON-CONTROLLED         SUBSTANCE (CMPD RX) - PHARMACY TO MIX         COMPOUNDED MEDICATION          (M81.0) Age related osteoporosis, unspecified pathological fracture presence  Plan: Vitamin D Deficiency, TSH with free T4 reflex,         Parathyroid Hormone Intact, Basic metabolic         panel  (Ca, Cl, CO2, Creat, Gluc, K, Na, BUN),         OFFICE/OUTPT VISIT,EST,LEVL IV, alendronate         (FOSAMAX) 70 MG tablet        Discussed risks and benefits of this medication. Continue vitamin D supplement, regular exercise, falls precautions      (L82.0) Seborrheic keratosis, inflamed  Comment: left upper back   Plan: DESTRUCT BENIGN LESION, UP TO 14        Liquid nitrogen was applied for 5 -10 seconds x 2 to the skin lesion and the expected blistering or scabbing reaction explained. Patient reminded to expect hypopigmented scars from the procedure. Return if lesions fail to fully resolve.      (B07.8) Common wart  Comment: left dorsal thumb - near base on  "nail border   Plan: DESTRUCT BENIGN LESION, UP TO 14        Liquid nitrogen was applied for 5 -10 seconds x 2 to the skin lesion and the expected blistering or scabbing reaction explained. Patient reminded to expect hypopigmented scars from the procedure. Return if lesions fail to fully resolve.      (N32.81) Overactive bladder  Plan: tolterodine ER (DETROL LA) 4 MG 24 hr capsule,         OFFICE/OUTPT VISIT,EST,LEVL IV          (R91.8) Lung nodules  Plan: CT Chest w/o Contrast, OFFICE/OUTPT         VISIT,EST,LEVL IV          (Z85.41) History of cervical cancer  (Z12.4) Screening for malignant neoplasm of cervix  Plan: Pap screen with HPV - recommended age 30 - 65         years          Patient has been advised of split billing requirements and indicates understanding: Yes    COUNSELING:  Reviewed preventive health counseling, as reflected in patient instructions  Special attention given to:       Regular exercise       Healthy diet/nutrition       Bladder control       Fall risk prevention       Osteoporosis prevention/bone health       Colon cancer screening       Hepatitis C screening    Estimated body mass index is 20.87 kg/m  as calculated from the following:    Height as of this encounter: 1.681 m (5' 6.18\").    Weight as of this encounter: 59 kg (130 lb).        She reports that she has never smoked. She has never used smokeless tobacco.      Appropriate preventive services were discussed with this patient, including applicable screening as appropriate for cardiovascular disease, diabetes, osteopenia/osteoporosis, and glaucoma.  As appropriate for age/gender, discussed screening for colorectal cancer, prostate cancer, breast cancer, and cervical cancer. Checklist reviewing preventive services available has been given to the patient.    Reviewed patients plan of care and provided an AVS. The Basic Care Plan (routine screening as documented in Health Maintenance) for Wendy meets the Care Plan requirement. This " Care Plan has been established and reviewed with the Patient.    Counseling Resources:  ATP IV Guidelines  Pooled Cohorts Equation Calculator  Breast Cancer Risk Calculator  Breast Cancer: Medication to Reduce Risk  FRAX Risk Assessment  ICSI Preventive Guidelines  Dietary Guidelines for Americans, 2010  USDA's MyPlate  ASA Prophylaxis  Lung CA Screening    Rosanna Lenz MD  [facility]    Identified Health Risks:    The patient was counseled and encouraged to consider modifying their diet and eating habits. She was provided with information on recommended healthy diet options.  The patient was provided with written information regarding signs of hearing loss.

## 2022-03-21 NOTE — RESULT ENCOUNTER NOTE
Your results are normal.  Rosanna Lenz MD    The 10-year ASCVD risk score (Shukri HARPER Jr., et al., 2013) is: 6.3%    Values used to calculate the score:      Age: 66 years      Sex: Female      Is Non- : No      Diabetic: No      Tobacco smoker: No      Systolic Blood Pressure: 126 mmHg      Is BP treated: Yes      HDL Cholesterol: 87 mg/dL      Total Cholesterol: 219 mg/dL

## 2022-03-22 ENCOUNTER — ANCILLARY PROCEDURE (OUTPATIENT)
Dept: CT IMAGING | Facility: CLINIC | Age: 67
End: 2022-03-22
Attending: FAMILY MEDICINE
Payer: COMMERCIAL

## 2022-03-22 DIAGNOSIS — R91.8 LUNG NODULES: ICD-10-CM

## 2022-03-22 PROCEDURE — 71250 CT THORAX DX C-: CPT | Mod: TC | Performed by: RADIOLOGY

## 2022-03-24 LAB
BKR LAB AP GYN ADEQUACY: NORMAL
BKR LAB AP GYN INTERPRETATION: NORMAL
BKR LAB AP HPV REFLEX: NORMAL
BKR LAB AP PREVIOUS ABNORMAL: NORMAL
PATH REPORT.COMMENTS IMP SPEC: NORMAL
PATH REPORT.COMMENTS IMP SPEC: NORMAL
PATH REPORT.RELEVANT HX SPEC: NORMAL

## 2022-03-24 NOTE — RESULT ENCOUNTER NOTE
Please call patient:  Your CT scan shows areas of nodularity that require follow-up testing. I recommend a consult at our [facility] for best next steps, and have made a referral for outreach.   Rosanna Lenz MD

## 2022-03-25 LAB
HUMAN PAPILLOMA VIRUS 16 DNA: NEGATIVE
HUMAN PAPILLOMA VIRUS 18 DNA: NEGATIVE
HUMAN PAPILLOMA VIRUS FINAL DIAGNOSIS: NORMAL
HUMAN PAPILLOMA VIRUS OTHER HR: NEGATIVE

## 2022-03-28 ENCOUNTER — DOCUMENTATION ONLY (OUTPATIENT)
Dept: ONCOLOGY | Facility: CLINIC | Age: 67
End: 2022-03-28
Payer: COMMERCIAL

## 2022-03-28 NOTE — PROGRESS NOTES
Action March 28, 2022 5:22 PM ABT   Action Taken Image request sent to Lizy     Action March 29, 2022 2:04 PM ABT   Action Taken Images from Lizy received and resolved to PACS

## 2022-04-12 NOTE — TELEPHONE ENCOUNTER
RECORDS STATUS - ALL OTHER DIAGNOSIS      RECORDS RECEIVED FROM: [facility]   DATE RECEIVED: 4/12   NOTES STATUS DETAILS   OFFICE NOTE from referring provider [facility] Rosanan Lenz MD   OFFICE NOTE from medical oncologist [facility] 12/10/19   MEDICATION LIST [facility] 3/21/22   LABS     ANYTHING RELATED TO DIAGNOSIS Epic 3/21/22   IMAGING (NEED IMAGES & REPORT)     CT SCANS PACS 3/22/22, 2/3/20, 8/20/19: Epic    2/28/21: [facility]   XR PACS Epic

## 2022-04-13 ENCOUNTER — TELEPHONE (OUTPATIENT)
Dept: SURGERY | Facility: CLINIC | Age: 67
End: 2022-04-13
Payer: COMMERCIAL

## 2022-04-13 DIAGNOSIS — R91.8 LUNG NODULES: Primary | ICD-10-CM

## 2022-04-13 NOTE — TELEPHONE ENCOUNTER
Called Ele as she had cancelled her appointment with Dr. Mg because she did not know what it was for. I explained to Ele that she has 3 lung nodules and at this appointment she would discuss with the provider what the next step would be, keep watching or possibly biopsy one of the nodules. She would like to have another scan in 3 months and have a clinic visit with one of the Interventional Pulmonologist's. Ele appreciated my call and had no further questions.

## 2022-04-14 ENCOUNTER — PRE VISIT (OUTPATIENT)
Dept: PULMONOLOGY | Facility: CLINIC | Age: 67
End: 2022-04-14
Payer: COMMERCIAL

## 2022-07-06 ENCOUNTER — ANCILLARY PROCEDURE (OUTPATIENT)
Dept: CT IMAGING | Facility: CLINIC | Age: 67
End: 2022-07-06
Attending: CLINICAL NURSE SPECIALIST
Payer: COMMERCIAL

## 2022-07-06 DIAGNOSIS — R91.8 LUNG NODULES: ICD-10-CM

## 2022-07-06 PROCEDURE — 71250 CT THORAX DX C-: CPT | Mod: TC | Performed by: RADIOLOGY

## 2022-07-07 ENCOUNTER — OFFICE VISIT (OUTPATIENT)
Dept: OPHTHALMOLOGY | Facility: CLINIC | Age: 67
End: 2022-07-07
Payer: COMMERCIAL

## 2022-07-07 DIAGNOSIS — H43.811 POSTERIOR VITREOUS DETACHMENT OF RIGHT EYE: ICD-10-CM

## 2022-07-07 DIAGNOSIS — Z01.01 ENCOUNTER FOR EXAMINATION OF EYES AND VISION WITH ABNORMAL FINDINGS: ICD-10-CM

## 2022-07-07 DIAGNOSIS — H52.4 PRESBYOPIA: ICD-10-CM

## 2022-07-07 DIAGNOSIS — R68.89 SUSPECTED GLAUCOMA OF BOTH EYES: Primary | ICD-10-CM

## 2022-07-07 PROCEDURE — 92133 CPTRZD OPH DX IMG PST SGM ON: CPT | Performed by: OPHTHALMOLOGY

## 2022-07-07 PROCEDURE — 92014 COMPRE OPH EXAM EST PT 1/>: CPT | Performed by: OPHTHALMOLOGY

## 2022-07-07 PROCEDURE — 92015 DETERMINE REFRACTIVE STATE: CPT | Performed by: OPHTHALMOLOGY

## 2022-07-07 ASSESSMENT — REFRACTION_WEARINGRX
OS_AXIS: 180
OS_CYLINDER: +1.00
OD_AXIS: 176
OD_ADD: +2.25
OD_SPHERE: -0.25
SPECS_TYPE: PAL
OD_CYLINDER: +1.50
OS_SPHERE: +0.25
OS_ADD: +2.25

## 2022-07-07 ASSESSMENT — VISUAL ACUITY
OD_CC+: -3
METHOD: SNELLEN - LINEAR
OS_CC: 20/25
OD_CC: 20/25
OS_CC+: -1
CORRECTION_TYPE: GLASSES

## 2022-07-07 ASSESSMENT — REFRACTION_MANIFEST
OS_AXIS: 170
OD_AXIS: 002
OS_ADD: +2.50
OD_ADD: +2.50
OD_SPHERE: PLANO
OS_SPHERE: +0.25
OS_CYLINDER: +1.75
OD_CYLINDER: +1.50

## 2022-07-07 ASSESSMENT — EXTERNAL EXAM - RIGHT EYE: OD_EXAM: 2+ BROW PTOSIS

## 2022-07-07 ASSESSMENT — TONOMETRY
OS_IOP_MMHG: 16
IOP_METHOD: APPLANATION
OD_IOP_MMHG: 16

## 2022-07-07 ASSESSMENT — CUP TO DISC RATIO
OS_RATIO: 0.4
OD_RATIO: 0.3

## 2022-07-07 ASSESSMENT — EXTERNAL EXAM - LEFT EYE: OS_EXAM: 2+ BROW PTOSIS

## 2022-07-07 ASSESSMENT — CONF VISUAL FIELD
OD_NORMAL: 1
OS_NORMAL: 1

## 2022-07-07 NOTE — LETTER
"    7/7/2022         RE: Wendy Francis  [address] [place] [id number]        Dear Colleague,    Thank you for referring your patient, Wendy Francis, to the [facility]. Please see a copy of my visit note below.     Current Eye Medications:  none     Subjective: here for complete eye exam today. 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 Mother had glaucoma, OCT done today. Also one in axis from 2019     Objective:  See Ophthalmology Exam.       Assessment:  New baseline eye exam in patient who is a glaucoma suspect.      ICD-10-CM    1. Suspected glaucoma of both eyes  H40.003    2. Posterior vitreous detachment of right eye  H43.811    3. Encounter for examination of eyes and vision with abnormal findings  Z01.01    4. Presbyopia  H52.4          Plan:  Glasses prescription given - optional  Use artificial tears up to four times a day (like Refresh Optive, Systane Balance, TheraTears, or generic artificial tears are ok. Avoid \"get the red out\" drops).   Possible posterior vitreous detachment (sudden onset large floater and/or flashing lights) left eye discussed.   Continue observation with regard to glaucoma suspect status; watch left eye closely.   Return visit 6 months for an intraocular pressure check, glaucoma OCT, and Blakely Visual Field.   Dre Larios M.D.  [phone]            Again, thank you for allowing me to participate in the care of your patient.        Sincerely,        Dre Larios MD    "

## 2022-07-07 NOTE — PROGRESS NOTES
" Current Eye Medications:  none     Subjective: here for complete eye exam today. 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 Mother had glaucoma, OCT done today. Also one in axis from 2019     Objective:  See Ophthalmology Exam.       Assessment:  New baseline eye exam in patient who is a glaucoma suspect.      ICD-10-CM    1. Suspected glaucoma of both eyes  H40.003    2. Posterior vitreous detachment of right eye  H43.811    3. Encounter for examination of eyes and vision with abnormal findings  Z01.01    4. Presbyopia  H52.4          Plan:  Glasses prescription given - optional  Use artificial tears up to four times a day (like Refresh Optive, Systane Balance, TheraTears, or generic artificial tears are ok. Avoid \"get the red out\" drops).   Possible posterior vitreous detachment (sudden onset large floater and/or flashing lights) left eye discussed.   Continue observation with regard to glaucoma suspect status; watch left eye closely.   Return visit 6 months for an intraocular pressure check, glaucoma OCT, and Blakely Visual Field.   Dre Larios M.D.  [phone]        "

## 2022-07-08 PROBLEM — H43.811 POSTERIOR VITREOUS DETACHMENT OF RIGHT EYE: Status: ACTIVE | Noted: 2022-07-08

## 2022-07-08 PROBLEM — R68.89 SUSPECTED GLAUCOMA OF BOTH EYES: Status: ACTIVE | Noted: 2022-07-08

## 2022-07-08 ASSESSMENT — PACHYMETRY
OD_CT(UM): 530
OS_CT(UM): 532

## 2022-07-12 ENCOUNTER — VIRTUAL VISIT (OUTPATIENT)
Dept: PULMONOLOGY | Facility: OTHER | Age: 67
End: 2022-07-12
Attending: FAMILY MEDICINE
Payer: COMMERCIAL

## 2022-07-12 DIAGNOSIS — R93.89 ABNORMAL CHEST CT: ICD-10-CM

## 2022-07-12 DIAGNOSIS — R91.8 LUNG NODULES: ICD-10-CM

## 2022-07-12 PROCEDURE — 99204 OFFICE O/P NEW MOD 45 MIN: CPT | Mod: 95 | Performed by: INTERNAL MEDICINE

## 2022-07-12 NOTE — PATIENT INSTRUCTIONS
Your imaging will be reviewed with a radiologist to confirm follow up imaging plan. The lung nodules were previously very small/non-concerning. Some are difficult to assess due to mucus in the lungs.    Use aerobika flutter valve twice per day after inhalers.    I will follow up via mychart regarding repeat CT scans.        Miriam Coffman MD  Pulmonary and Critical Care Medicine  [facility]  Office: [phone]

## 2022-07-12 NOTE — PROGRESS NOTES
Ele is a 67 year old who is being evaluated via a billable video visit.      Pt is in [place]    How would you like to obtain your AVS? MyChart  If the video visit is dropped, the invitation should be resent by: Send to e-mail at: [email or web address]  Will anyone else be joining your video visit? No        Video-Visit Details    Video Start Time: 9:01 AM    Type of service:  Video Visit    Video End Time: 9:29 AM    Originating Location (pt. Location): Home    Distant Location (provider location):  [facility]     Platform used for Video Visit: Rudolph Arguello       Pulmonary Clinic Outpatient Consultation    Assessment and Plan:   66 yo F never smoker with a history of childhood asthma, who presents for an evaluation of lung nodules. Asthma is well controlled she denies symptoms other than a chromic productive cough.     These have been followed since 2019. Difficult to discern mucoid impaction versus true nodules. She is low risk.      Recommendations:    1. Asthma:  Continue stable regimen of symbicort BID and albuterol, duoneb prn    2. Pulmonary nodules:  Difficult to completely evaluate nodule versus mucoid impaction. Will review with radiology and advise her re timing of any follow up imaging.     She is un-bothered by pulmonary symptoms/cough. She has a flutter valve, recommended increasing use to BID daily after symbicort. Prior to any repeat imaging will ask her to use nebulizer and flutter valve 2-3 times per day.    3. Hepatic lesion  Messaged PCP and provider who ordered scan. Has prior hepatic MRI from 2019, this may have already been evaluated.      Follow up with repeat scan, timing pending radiology review.     Miriam Coffman MD  Pulmonary and Critical Care Medicine  [facility]  Office: [phone]    ----------------------    Reason for Consult: Lung nodules     HPI:   66 yo F never smoker with a history of childhood asthma, who presents for an evaluation of lung  "nodules.     Reports asthma is well controlled on regimen  Symbicort + spacer BID   Albuterol - seldom use  Duoneb prn - seldom use, during URIs  Has aerobika that she loves and uses prn  Denies breathing concerns  Notes she has long standing productive cough    ROS:  A 12-system review was obtained and was negative with the exception of the symptoms endorsed in the history of present illness.    PMH:  Past Medical History:   Diagnosis Date     Acute ischemic colitis (H)      Allergic rhinitis     molds, dust     Atrophic vaginitis      Cervical cancer (H) 04/10/2000    chemo and radiation, IB-II squamous cell, positive nodes     Degenerative joint disease of right hip      Diverticulosis of colon 06/2004     Hypertension goal BP (blood pressure) < 140/90      Liver hemangioma      Lung nodules      Mild persistent asthma      Mild recurrent major depression (H)      Nasal polyps      Osteoporosis      Overactive bladder      Sensorineural hearing losses      Tinnitus      Varicose vein of leg     No surgery     PSH:  Past Surgical History:   Procedure Laterality Date     COLONOSCOPY WITH CO2 INSUFFLATION N/A 04/12/2017    Procedure: COLONOSCOPY WITH CO2 INSUFFLATION;  Surgeon: Anatoly Do MD;  Location: [facility] OR     COLPOSCOPY,LOOP ELECTRD CERVIX EXCIS  2000    Radiation & chemotherapy for cervical cancer. Diagnosed in 2000     CYSTOSCOPY N/A 11/30/2018    Procedure: CYSTOSCOPY;  Surgeon: Genesis Hicks MD;  Location: [facility] OR      CORRECT BUNION,SIMPLE      ESTEPHANIE     LAPAROSCOPIC SALPINGO-OOPHORECTOMY Bilateral 11/30/2018    Procedure: Laparoscopic Bilateral Salpingo-Oophorectomy, Pelvic washings, cystoscopy;  Surgeon: Genesis Hicks MD;  Location: [facility] OR     SINUS SURGERY      x 2, Nasal polyps removed     Allergies:  Allergies   Allergen Reactions     Fluticasone Other (See Comments)     Sneezing     Citalopram Other (See Comments)     Thinking changes  \"Thinking changes\"     Flonase [Fluticasone " Propionate]      Sulfa Drugs Rash     Sulfasalazine Rash     Family HX:  Family History   Problem Relation Age of Onset     Glaucoma Mother      Hypertension Father      Asthma Father      Pacemaker Father      Cancer Maternal Grandmother         pelvic     Ovarian Cancer Maternal Grandmother      Diabetes Maternal Grandfather      Coronary Artery Disease Paternal Grandmother      Allergies Brother      Neurologic Disorder Sister         brain aneurysm     Cerebrovascular Disease Sister      C.A.D. No family hx of      Social Hx:  Never smoker  No inhaled substances     Current Meds:  Reviewed    Physical Exam:  Virtual visit    Labs:  Reviewed    Imaging studies:  Personally reviewed and interpreted:    8/2019 CT chest: 6 mm and smaller lung nodules in RUL, ANA CRISTINA, LLL, mucus plugging. Neg mediastinum    2/2020 CT Chest: (6 months) Increased TIB and mucoid impaction. No report of change to nodules    3/2022 CT Chest: ANA CRISTINA 7 mm endo nodularity (larger). New 11 mm RUL endo nodularity. Mucus plugging. Cannot r/o endobronchial lesions    7/2022 CT chest: stable RUL 11 mm. Stable ANA CRISTINA 7 mm mucus impaction.       PFT's   None

## 2022-08-08 ENCOUNTER — DOCUMENTATION ONLY (OUTPATIENT)
Dept: PULMONOLOGY | Facility: OTHER | Age: 67
End: 2022-08-08

## 2022-08-08 NOTE — PROGRESS NOTES
"CT reviewed with radiology:    While sort of nodular, what she has is pretty conspicuous  airways disease. In the anterior aspects of both upper lobes there are dilated and material impacted subsegmental airways associated with small peripheral centrilobular nodules consistent with debris in the respiratory bronchioles. This pattern has progressed from 2019. There is patchy but severe airway wall thickening of the segmental and subsegmental airways in both lower lobes. The periphery of the lower lobes are lucent consistent with associated peripheral air trapping.     Asthma or eosinophilic bronchitis could product the airway thickening. The upper lobe dilated impacted airways are not yet \"finger in glove\" but could be ABPA. No nodules here that need additional imaging follow-up.       Will communicate with Ele, and discuss any additional testing/airway clearance therapies.      Miriam Coffman MD  Pulmonary and Critical Care Medicine  [facility]  Office: [phone]        "

## 2022-11-19 ENCOUNTER — HEALTH MAINTENANCE LETTER (OUTPATIENT)
Age: 67
End: 2022-11-19

## 2023-02-06 NOTE — PROGRESS NOTES
History of Present Illness - Wendy Francis is a very pleasant 67 year old female here to see me for the first time for nasal issues.    This is the first time she has seen me, but has seen Dr Zhang years ago, most recently in 2015.  He had her on Budesonide compound and that helped dramatically.    But once she has gone on Medicare, they will not cover compounded medications and so she has been without them for about 3 months and her nasal health has declined dramatically.    Past Medical History -   Patient Active Problem List   Diagnosis     Mild persistent asthma     Overactive bladder     History of cervical cancer     CARDIOVASCULAR SCREENING; LDL GOAL LESS THAN 160     Allergic rhinitis     Advanced directives, counseling/discussion     Hypertension goal BP (blood pressure) < 140/90     Nasal polyposis     Lung nodules     Degenerative joint disease of right hip     Osteoporosis     Posterior vitreous detachment of right eye     Suspected glaucoma of both eyes       Current Medications -   Current Outpatient Medications:      albuterol (PROAIR HFA/PROVENTIL HFA/VENTOLIN HFA) 108 (90 Base) MCG/ACT inhaler, Inhale 2 puffs into the lungs every 4 hours as needed for shortness of breath / dyspnea, Disp: 18 g, Rfl: 11     alendronate (FOSAMAX) 70 MG tablet, TAKE 1 TABLET EVERY 7 DAYS, Disp: 12 tablet, Rfl: 0     amLODIPine (NORVASC) 10 MG tablet, Take 1 tablet (10 mg) by mouth daily, Disp: 90 tablet, Rfl: 3     amoxicillin (AMOXIL) 500 MG capsule, Take 4 capsules (2,000 mg) by mouth once as needed (take 30-60 minutes prior to any dental work/cleaning), Disp: 4 capsule, Rfl: 2     budesonide-formoterol (SYMBICORT) 80-4.5 MCG/ACT Inhaler, USE 2 INHALATIONS TWICE A DAY, Disp: 30.6 g, Rfl: 3     cetirizine (ZYRTEC) 10 MG tablet, Take 1 tablet by mouth daily as needed for allergies., Disp: , Rfl:      COMPOUNDED NON-CONTROLLED SUBSTANCE (CMPD RX) - PHARMACY TO MIX COMPOUNDED MEDICATION, Budesonide 2 mg/L  "irrigation solution. Rinse with 20 mls in each nostril two times a day, Disp: 80 mL, Rfl: 11     ipratropium - albuterol 0.5 mg/2.5 mg/3 mL (DUONEB) 0.5-2.5 (3) MG/3ML neb solution, Take 1 vial (3 mLs) by nebulization every 6 hours as needed for shortness of breath / dyspnea or wheezing, Disp: 90 mL, Rfl: 4     MULTI-VITAMIN OR, DAILY, Disp: , Rfl:      Omega-3 Fatty Acids (FISH OIL) 500 MG CAPS, Take  by mouth. 1 daily  (Patient not taking: Reported on 7/12/2022), Disp: , Rfl:      tolterodine ER (DETROL LA) 4 MG 24 hr capsule, Take 1 capsule (4 mg) by mouth daily, Disp: 90 capsule, Rfl: 3     Triamcinolone Acetonide (NASACORT ALLERGY 24HR NA), , Disp: , Rfl:     Allergies -   Allergies   Allergen Reactions     Fluticasone Other (See Comments)     Sneezing     Citalopram Other (See Comments)     Thinking changes  \"Thinking changes\"     Flonase [Fluticasone Propionate]      Sulfa Drugs Rash     Sulfasalazine Rash       Social History -   Social History     Socioeconomic History     Marital status:      Spouse name: Gerry     Number of children: 0     Years of education: 14   Occupational History     Occupation: office work     Employer: OTHER     Comment: [employer]     Employer: [employer]   Tobacco Use     Smoking status: Never     Smokeless tobacco: Never   Substance and Sexual Activity     Alcohol use: Yes     Alcohol/week: 1.7 standard drinks     Comment: 1-2 a month     Drug use: No     Sexual activity: Not Currently     Partners: Male     Birth control/protection: Post-menopausal   Other Topics Concern      Service No     Blood Transfusions No     Caffeine Concern No     Occupational Exposure No     Hobby Hazards No     Sleep Concern No     Stress Concern Yes     Comment: marital stress, as patient is not sexually active.     Weight Concern No     Special Diet No     Back Care No     Exercise Yes     Comment: gym 3-4 x week, aerobics, weight machines.     Bike Helmet No     Comment: she " "will purchase one.     Seat Belt Yes     Self-Exams Yes     Comment: occasional     Parent/sibling w/ CABG, MI or angioplasty before 65F 55M? No   Social History Narrative    She and her  have been taking lessons for dancing.    Hobby of dancing, go to [place] to dance.        Family History -   Family History   Problem Relation Age of Onset     Glaucoma Mother      Hypertension Father      Asthma Father      Pacemaker Father      Cancer Maternal Grandmother         pelvic     Ovarian Cancer Maternal Grandmother      Diabetes Maternal Grandfather      Coronary Artery Disease Paternal Grandmother      Allergies Brother      Neurologic Disorder Sister         brain aneurysm     Cerebrovascular Disease Sister      C.A.D. No family hx of        Review of Systems - As per HPI and PMHx, otherwise 10+ system review of the head and neck, and general constitution is negative.    Physical Exam  /74   Pulse 84   Resp 16   Ht 1.681 m (5' 6.18\")   Wt 58.5 kg (129 lb)   LMP  (LMP Unknown)   SpO2 95%   BMI 20.71 kg/m      General - The patient is well nourished and well developed, and appears to have good nutritional status.  Alert and oriented to person and place, answers questions and cooperates with examination appropriately.   Head and Face - Normocephalic and atraumatic, with no gross asymmetry noted of the contour of the facial features.  The facial nerve is intact, with strong symmetric movements.  Voice and Breathing - The patient was breathing comfortably without the use of accessory muscles. There was no wheezing, stridor, or stertor.  The patients voice was clear and strong, and had appropriate pitch and quality.  Ears - The tympanic membranes are normal in appearance, bony landmarks are intact.  No retraction, perforation, or masses.  No fluid or purulence was seen in the external canal or the middle ear. No evidence of infection of the middle ear or external canal, cerumen was normal in " appearance.  Eyes - Extraocular movements intact, and the pupils were reactive to light.  Sclera were not icteric or injected, conjunctiva were pink and moist.  Mouth - Examination of the oral cavity showed pink, healthy oral mucosa. No lesions or ulcerations noted.  The tongue was mobile and midline, and the dentition were in good condition.    Throat - The walls of the oropharynx were smooth, pink, moist, symmetric, and had no lesions or ulcerations.  The tonsillar pillars and soft palate were symmetric.  The uvula was midline on elevation.    Neck - Normal midline excursion of the laryngotracheal complex during swallowing.  Full range of motion on passive movement.  Palpation of the occipital, submental, submandibular, internal jugular chain, and supraclavicular nodes did not demonstrate any abnormal lymph nodes or masses.  The carotid pulse was palpable bilaterally.  Palpation of the thyroid was soft and smooth, with no nodules or goiter appreciated.  The trachea was mobile and midline.  Nose - External contour is symmetric, no gross deflection or scars.  Nasal mucosa is very boggy and edematous.      A/P - Wendy Francis is a 67 year old female  (J32.4) Chronic pansinusitis  (primary encounter diagnosis)    I will start her on budesonide in [facility] and see if that is of equivalent effect.    If not, I would then consider order a new CT sacn of the sinuses.

## 2023-02-13 ENCOUNTER — OFFICE VISIT (OUTPATIENT)
Dept: OTOLARYNGOLOGY | Facility: CLINIC | Age: 68
End: 2023-02-13
Payer: COMMERCIAL

## 2023-02-13 VITALS
HEART RATE: 84 BPM | OXYGEN SATURATION: 95 % | DIASTOLIC BLOOD PRESSURE: 74 MMHG | RESPIRATION RATE: 16 BRPM | HEIGHT: 66 IN | WEIGHT: 129 LBS | SYSTOLIC BLOOD PRESSURE: 131 MMHG | BODY MASS INDEX: 20.73 KG/M2

## 2023-02-13 DIAGNOSIS — J45.40 MODERATE PERSISTENT ASTHMA WITHOUT COMPLICATION: ICD-10-CM

## 2023-02-13 DIAGNOSIS — J32.4 CHRONIC PANSINUSITIS: Primary | ICD-10-CM

## 2023-02-13 PROCEDURE — 99203 OFFICE O/P NEW LOW 30 MIN: CPT | Performed by: OTOLARYNGOLOGY

## 2023-02-13 RX ORDER — BUDESONIDE 0.5 MG/2ML
INHALANT ORAL
Qty: 60 ML | Refills: 12 | Status: SHIPPED | OUTPATIENT
Start: 2023-02-13 | End: 2023-04-06

## 2023-02-13 ASSESSMENT — PAIN SCALES - GENERAL: PAINLEVEL: NO PAIN (0)

## 2023-02-13 NOTE — LETTER
2/13/2023         RE: Wendy Francis  [address] [place] [id number]        Dear Colleague,    Thank you for referring your patient, Wendy Francis, to the [facility]. Please see a copy of my visit note below.    History of Present Illness - Wendy Francis is a very pleasant 67 year old female here to see me for the first time for nasal issues.    This is the first time she has seen me, but has seen Dr Zhang years ago, most recently in 2015.  He had her on Budesonide compound and that helped dramatically.    But once she has gone on Medicare, they will not cover compounded medications and so she has been without them for about 3 months and her nasal health has declined dramatically.    Past Medical History -   Patient Active Problem List   Diagnosis     Mild persistent asthma     Overactive bladder     History of cervical cancer     CARDIOVASCULAR SCREENING; LDL GOAL LESS THAN 160     Allergic rhinitis     Advanced directives, counseling/discussion     Hypertension goal BP (blood pressure) < 140/90     Nasal polyposis     Lung nodules     Degenerative joint disease of right hip     Osteoporosis     Posterior vitreous detachment of right eye     Suspected glaucoma of both eyes       Current Medications -   Current Outpatient Medications:      albuterol (PROAIR HFA/PROVENTIL HFA/VENTOLIN HFA) 108 (90 Base) MCG/ACT inhaler, Inhale 2 puffs into the lungs every 4 hours as needed for shortness of breath / dyspnea, Disp: 18 g, Rfl: 11     alendronate (FOSAMAX) 70 MG tablet, TAKE 1 TABLET EVERY 7 DAYS, Disp: 12 tablet, Rfl: 0     amLODIPine (NORVASC) 10 MG tablet, Take 1 tablet (10 mg) by mouth daily, Disp: 90 tablet, Rfl: 3     amoxicillin (AMOXIL) 500 MG capsule, Take 4 capsules (2,000 mg) by mouth once as needed (take 30-60 minutes prior to any dental work/cleaning), Disp: 4 capsule, Rfl: 2     budesonide-formoterol (SYMBICORT) 80-4.5 MCG/ACT Inhaler, USE 2 INHALATIONS  "TWICE A DAY, Disp: 30.6 g, Rfl: 3     cetirizine (ZYRTEC) 10 MG tablet, Take 1 tablet by mouth daily as needed for allergies., Disp: , Rfl:      COMPOUNDED NON-CONTROLLED SUBSTANCE (CMPD RX) - PHARMACY TO MIX COMPOUNDED MEDICATION, Budesonide 2 mg/L irrigation solution. Rinse with 20 mls in each nostril two times a day, Disp: 80 mL, Rfl: 11     ipratropium - albuterol 0.5 mg/2.5 mg/3 mL (DUONEB) 0.5-2.5 (3) MG/3ML neb solution, Take 1 vial (3 mLs) by nebulization every 6 hours as needed for shortness of breath / dyspnea or wheezing, Disp: 90 mL, Rfl: 4     MULTI-VITAMIN OR, DAILY, Disp: , Rfl:      Omega-3 Fatty Acids (FISH OIL) 500 MG CAPS, Take  by mouth. 1 daily  (Patient not taking: Reported on 7/12/2022), Disp: , Rfl:      tolterodine ER (DETROL LA) 4 MG 24 hr capsule, Take 1 capsule (4 mg) by mouth daily, Disp: 90 capsule, Rfl: 3     Triamcinolone Acetonide (NASACORT ALLERGY 24HR NA), , Disp: , Rfl:     Allergies -   Allergies   Allergen Reactions     Fluticasone Other (See Comments)     Sneezing     Citalopram Other (See Comments)     Thinking changes  \"Thinking changes\"     Flonase [Fluticasone Propionate]      Sulfa Drugs Rash     Sulfasalazine Rash       Social History -   Social History     Socioeconomic History     Marital status:      Spouse name: Gerry     Number of children: 0     Years of education: 14   Occupational History     Occupation: office work     Employer: OTHER     Comment: [employer]     Employer: [email or web address]   Tobacco Use     Smoking status: Never     Smokeless tobacco: Never   Substance and Sexual Activity     Alcohol use: Yes     Alcohol/week: 1.7 standard drinks     Comment: 1-2 a month     Drug use: No     Sexual activity: Not Currently     Partners: Male     Birth control/protection: Post-menopausal   Other Topics Concern      Service No     Blood Transfusions No     Caffeine Concern No     Occupational Exposure No     Hobby Hazards No     Sleep Concern No     " "Stress Concern Yes     Comment: marital stress, as patient is not sexually active.     Weight Concern No     Special Diet No     Back Care No     Exercise Yes     Comment: gym 3-4 x week, aerobics, weight machines.     Bike Helmet No     Comment: she will purchase one.     Seat Belt Yes     Self-Exams Yes     Comment: occasional     Parent/sibling w/ CABG, MI or angioplasty before 65F 55M? No   Social History Narrative    She and her  have been taking lessons for dancing.    Hobby of dancing, go to [place] to dance.        Family History -   Family History   Problem Relation Age of Onset     Glaucoma Mother      Hypertension Father      Asthma Father      Pacemaker Father      Cancer Maternal Grandmother         pelvic     Ovarian Cancer Maternal Grandmother      Diabetes Maternal Grandfather      Coronary Artery Disease Paternal Grandmother      Allergies Brother      Neurologic Disorder Sister         brain aneurysm     Cerebrovascular Disease Sister      C.A.D. No family hx of        Review of Systems - As per HPI and PMHx, otherwise 10+ system review of the head and neck, and general constitution is negative.    Physical Exam  /74   Pulse 84   Resp 16   Ht 1.681 m (5' 6.18\")   Wt 58.5 kg (129 lb)   LMP  (LMP Unknown)   SpO2 95%   BMI 20.71 kg/m      General - The patient is well nourished and well developed, and appears to have good nutritional status.  Alert and oriented to person and place, answers questions and cooperates with examination appropriately.   Head and Face - Normocephalic and atraumatic, with no gross asymmetry noted of the contour of the facial features.  The facial nerve is intact, with strong symmetric movements.  Voice and Breathing - The patient was breathing comfortably without the use of accessory muscles. There was no wheezing, stridor, or stertor.  The patients voice was clear and strong, and had appropriate pitch and quality.  Ears - The tympanic membranes are normal " in appearance, bony landmarks are intact.  No retraction, perforation, or masses.  No fluid or purulence was seen in the external canal or the middle ear. No evidence of infection of the middle ear or external canal, cerumen was normal in appearance.  Eyes - Extraocular movements intact, and the pupils were reactive to light.  Sclera were not icteric or injected, conjunctiva were pink and moist.  Mouth - Examination of the oral cavity showed pink, healthy oral mucosa. No lesions or ulcerations noted.  The tongue was mobile and midline, and the dentition were in good condition.    Throat - The walls of the oropharynx were smooth, pink, moist, symmetric, and had no lesions or ulcerations.  The tonsillar pillars and soft palate were symmetric.  The uvula was midline on elevation.    Neck - Normal midline excursion of the laryngotracheal complex during swallowing.  Full range of motion on passive movement.  Palpation of the occipital, submental, submandibular, internal jugular chain, and supraclavicular nodes did not demonstrate any abnormal lymph nodes or masses.  The carotid pulse was palpable bilaterally.  Palpation of the thyroid was soft and smooth, with no nodules or goiter appreciated.  The trachea was mobile and midline.  Nose - External contour is symmetric, no gross deflection or scars.  Nasal mucosa is very boggy and edematous.      A/P - eWndy Francis is a 67 year old female  (J32.4) Chronic pansinusitis  (primary encounter diagnosis)    I will start her on budesonide in [facility] and see if that is of equivalent effect.    If not, I would then consider order a new CT sacn of the sinuses.        Again, thank you for allowing me to participate in the care of your patient.        Sincerely,        Dutch Ott MD

## 2023-02-17 ASSESSMENT — ASTHMA QUESTIONNAIRES
QUESTION_1 LAST FOUR WEEKS HOW MUCH OF THE TIME DID YOUR ASTHMA KEEP YOU FROM GETTING AS MUCH DONE AT WORK, SCHOOL OR AT HOME: NONE OF THE TIME
QUESTION_5 LAST FOUR WEEKS HOW WOULD YOU RATE YOUR ASTHMA CONTROL: WELL CONTROLLED
QUESTION_2 LAST FOUR WEEKS HOW OFTEN HAVE YOU HAD SHORTNESS OF BREATH: ONCE OR TWICE A WEEK
QUESTION_3 LAST FOUR WEEKS HOW OFTEN DID YOUR ASTHMA SYMPTOMS (WHEEZING, COUGHING, SHORTNESS OF BREATH, CHEST TIGHTNESS OR PAIN) WAKE YOU UP AT NIGHT OR EARLIER THAN USUAL IN THE MORNING: NOT AT ALL
ACT_TOTALSCORE: 22
ACT_TOTALSCORE: 22
QUESTION_4 LAST FOUR WEEKS HOW OFTEN HAVE YOU USED YOUR RESCUE INHALER OR NEBULIZER MEDICATION (SUCH AS ALBUTEROL): ONCE A WEEK OR LESS

## 2023-02-20 ENCOUNTER — OFFICE VISIT (OUTPATIENT)
Dept: PULMONOLOGY | Facility: CLINIC | Age: 68
End: 2023-02-20
Payer: COMMERCIAL

## 2023-02-20 VITALS
BODY MASS INDEX: 20.87 KG/M2 | WEIGHT: 130 LBS | SYSTOLIC BLOOD PRESSURE: 122 MMHG | DIASTOLIC BLOOD PRESSURE: 80 MMHG | HEART RATE: 94 BPM | OXYGEN SATURATION: 96 %

## 2023-02-20 DIAGNOSIS — J45.30 MILD PERSISTENT ASTHMA WITHOUT COMPLICATION: Primary | ICD-10-CM

## 2023-02-20 PROCEDURE — 99214 OFFICE O/P EST MOD 30 MIN: CPT | Performed by: INTERNAL MEDICINE

## 2023-02-20 NOTE — PATIENT INSTRUCTIONS
1) I I think you have a good regimen  2) While you are struggling to breathe, I would use the nebulizer every day  3) If you are struggling to control the mucous I have a few things we could try medicinally but they come with side effects.  4) If you ever need pulmonary help again we are here for you

## 2023-02-20 NOTE — PROGRESS NOTES
Assessment and Plan:Wendy Francis is a 67 year old female with a past medical history significant for asthma who presents to clinic today for follow up.  She seems to be a hypermucous maker per her descriptions.  I offered a couple of medical maneuvers to help dry up secretions from systemic anthistamines to the use of a LAMA but she is not overly bothered by it and wants to minimize medications.  She is content with symbicort and her nasal steroids.  She could use her duonebs more often, particularly when suffering a cold which she is now.    1) Asthma - mild persistent, recommend she stay on symbicort with as needed albuterol MDI or duoneb.  2) Nasal polyposis - on both liquid budesonide for irrigation and pulmicort, she believes both work.  Continue and follow with ENT  3) Lung nodules - these are mucous, she doesn't require follow up for this.  4) RTC as needed - she doesn't need ongoing pulmonary follow up for her asthma          CCx: asthma    HPI: Ms. Francis is a 67 year old female with a history of asthma and nasal polyposis who presents for follow up.  She was initially referred to discuss lung nodules which were deemed benign as she is a nonsmoker.  She presents today with a cold and increased congestion in her nose and lungs.  She feels she makes increased mucous all the time, including in her stool.  She isn't necessarily bothered by this.  She is on nasal steroids for nasal polyps.  She is using symbicort for her asthma without issue.  She will use her nebulizer every once in a while if short of breath. She has an albuterol MDI but isn't really using it at all.  She doesn't feel her current cold is making her overly short of breath and doesn't feel she needs anything besides her cold medicine.    ROS:  Review of Systems - History obtained from the patient  General ROS: negative  Psychological ROS: negative  ENT ROS: negative  Allergy and Immunology ROS: negative  Endocrine ROS: negative  Respiratory  ROS: positive for - cough, shortness of breath and wheezing  negative for - stridor or tachypnea  Cardiovascular ROS: no chest pain or palpitations  Gastrointestinal ROS: no abdominal pain, change in bowel habits, or black or bloody stools  Genito-Urinary ROS: no dysuria, trouble voiding, or hematuria  Musculoskeletal ROS: negative  Neurological ROS: no TIA or stroke symptoms  Dermatological ROS: negative      Current Meds:  Current Outpatient Medications   Medication Sig Dispense Refill     albuterol (PROAIR HFA/PROVENTIL HFA/VENTOLIN HFA) 108 (90 Base) MCG/ACT inhaler Inhale 2 puffs into the lungs every 4 hours as needed for shortness of breath / dyspnea 18 g 11     alendronate (FOSAMAX) 70 MG tablet TAKE 1 TABLET EVERY 7 DAYS 12 tablet 0     amLODIPine (NORVASC) 10 MG tablet Take 1 tablet (10 mg) by mouth daily 90 tablet 3     amoxicillin (AMOXIL) 500 MG capsule Take 4 capsules (2,000 mg) by mouth once as needed (take 30-60 minutes prior to any dental work/cleaning) 4 capsule 2     budesonide (PULMICORT) 0.5 MG/2ML neb solution Add one ampule into [facility] sinus rinse bottle with saline mixture and irrigate nose daily. 60 mL 12     budesonide-formoterol (SYMBICORT) 80-4.5 MCG/ACT Inhaler USE 2 INHALATIONS TWICE A DAY 30.6 g 3     cetirizine (ZYRTEC) 10 MG tablet Take 1 tablet by mouth daily as needed for allergies.       COMPOUNDED NON-CONTROLLED SUBSTANCE (CMPD RX) - PHARMACY TO MIX COMPOUNDED MEDICATION Budesonide 2 mg/L irrigation solution. Rinse with 20 mls in each nostril two times a day 80 mL 11     ipratropium - albuterol 0.5 mg/2.5 mg/3 mL (DUONEB) 0.5-2.5 (3) MG/3ML neb solution Take 1 vial (3 mLs) by nebulization every 6 hours as needed for shortness of breath / dyspnea or wheezing 90 mL 4     MULTI-VITAMIN OR Take 1 tablet by mouth daily       Omega-3 Fatty Acids (FISH OIL) 500 MG CAPS Take by mouth daily 1 daily       tolterodine ER (DETROL LA) 4 MG 24 hr capsule Take 1 capsule (4 mg) by mouth daily 90  capsule 3     Triamcinolone Acetonide (NASACORT ALLERGY 24HR NA)          Labs:  @clab@  Lab Results   Component Value Date    HGB TOO OLD TO TEST 07/28/2020     03/21/2022    POTASSIUM 3.8 03/21/2022    CHLORIDE 106 03/21/2022    CO2 29 03/21/2022    GLC 93 03/21/2022    BUN 15 03/21/2022    CR 0.82 03/21/2022       I have personally reviewed all pertinent imaging studies and PFT results unless otherwise noted.    Imaging studies:  POC US Guidance Needle Placement    Result Date: 11/30/2018  Bilateral TAP.        Physical Exam:  /80 (BP Location: Right arm, Patient Position: Chair, Cuff Size: Adult Regular)   Pulse 94   Wt 59 kg (130 lb)   LMP  (LMP Unknown)   SpO2 96%   BMI 20.87 kg/m    General - Well nourished  Ears/Mouth -  Deferred given mask use during pandemic  Neck - no cervical lymphadenopathy  Lungs - Scattered wheezing  CVS - regular rhythm with no murmurs, rubs or gallups  Abdomen - soft, NT, ND, NABS  Ext - no cyanosis, clubbing or edema  Skin - no rash  Psychology - alert and oriented, answers appropriate        Electronically signed by:    Kerwin Barroso MD PhD  [facility]

## 2023-02-20 NOTE — LETTER
2/20/2023       RE: Wendy Francis  [address] [place] [id number]     Dear Colleague,    Thank you for referring your patient, Wendy Francis, to the [facility]. Please see a copy of my visit note below.    Assessment and Plan:Wendy Francis is a 67 year old female with a past medical history significant for asthma who presents to clinic today for follow up.  She seems to be a hypermucous maker per her descriptions.  I offered a couple of medical maneuvers to help dry up secretions from systemic anthistamines to the use of a LAMA but she is not overly bothered by it and wants to minimize medications.  She is content with symbicort and her nasal steroids.  She could use her duonebs more often, particularly when suffering a cold which she is now.    1) Asthma - mild persistent, recommend she stay on symbicort with as needed albuterol MDI or duoneb.  2) Nasal polyposis - on both liquid budesonide for irrigation and pulmicort, she believes both work.  Continue and follow with ENT  3) Lung nodules - these are mucous, she doesn't require follow up for this.  4) RTC as needed - she doesn't need ongoing pulmonary follow up for her asthma          CCx: asthma    HPI: Ms. Francis is a 67 year old female with a history of asthma and nasal polyposis who presents for follow up.  She was initially referred to discuss lung nodules which were deemed benign as she is a nonsmoker.  She presents today with a cold and increased congestion in her nose and lungs.  She feels she makes increased mucous all the time, including in her stool.  She isn't necessarily bothered by this.  She is on nasal steroids for nasal polyps.  She is using symbicort for her asthma without issue.  She will use her nebulizer every once in a while if short of breath. She has an albuterol MDI but isn't really using it at all.  She doesn't feel her current  cold is making her overly short of breath and doesn't feel she needs anything besides her cold medicine.    ROS:  Review of Systems - History obtained from the patient  General ROS: negative  Psychological ROS: negative  ENT ROS: negative  Allergy and Immunology ROS: negative  Endocrine ROS: negative  Respiratory ROS: positive for - cough, shortness of breath and wheezing  negative for - stridor or tachypnea  Cardiovascular ROS: no chest pain or palpitations  Gastrointestinal ROS: no abdominal pain, change in bowel habits, or black or bloody stools  Genito-Urinary ROS: no dysuria, trouble voiding, or hematuria  Musculoskeletal ROS: negative  Neurological ROS: no TIA or stroke symptoms  Dermatological ROS: negative      Current Meds:  Current Outpatient Medications   Medication Sig Dispense Refill     albuterol (PROAIR HFA/PROVENTIL HFA/VENTOLIN HFA) 108 (90 Base) MCG/ACT inhaler Inhale 2 puffs into the lungs every 4 hours as needed for shortness of breath / dyspnea 18 g 11     alendronate (FOSAMAX) 70 MG tablet TAKE 1 TABLET EVERY 7 DAYS 12 tablet 0     amLODIPine (NORVASC) 10 MG tablet Take 1 tablet (10 mg) by mouth daily 90 tablet 3     amoxicillin (AMOXIL) 500 MG capsule Take 4 capsules (2,000 mg) by mouth once as needed (take 30-60 minutes prior to any dental work/cleaning) 4 capsule 2     budesonide (PULMICORT) 0.5 MG/2ML neb solution Add one ampule into [facility] sinus rinse bottle with saline mixture and irrigate nose daily. 60 mL 12     budesonide-formoterol (SYMBICORT) 80-4.5 MCG/ACT Inhaler USE 2 INHALATIONS TWICE A DAY 30.6 g 3     cetirizine (ZYRTEC) 10 MG tablet Take 1 tablet by mouth daily as needed for allergies.       COMPOUNDED NON-CONTROLLED SUBSTANCE (CMPD RX) - PHARMACY TO MIX COMPOUNDED MEDICATION Budesonide 2 mg/L irrigation solution. Rinse with 20 mls in each nostril two times a day 80 mL 11     ipratropium - albuterol 0.5 mg/2.5 mg/3 mL (DUONEB) 0.5-2.5 (3) MG/3ML neb solution Take 1 vial (3  mLs) by nebulization every 6 hours as needed for shortness of breath / dyspnea or wheezing 90 mL 4     MULTI-VITAMIN OR Take 1 tablet by mouth daily       Omega-3 Fatty Acids (FISH OIL) 500 MG CAPS Take by mouth daily 1 daily       tolterodine ER (DETROL LA) 4 MG 24 hr capsule Take 1 capsule (4 mg) by mouth daily 90 capsule 3     Triamcinolone Acetonide (NASACORT ALLERGY 24HR NA)          Labs:  @clab@  Lab Results   Component Value Date    HGB TOO OLD TO TEST 07/28/2020     03/21/2022    POTASSIUM 3.8 03/21/2022    CHLORIDE 106 03/21/2022    CO2 29 03/21/2022    GLC 93 03/21/2022    BUN 15 03/21/2022    CR 0.82 03/21/2022       I have personally reviewed all pertinent imaging studies and PFT results unless otherwise noted.    Imaging studies:  POC US Guidance Needle Placement    Result Date: 11/30/2018  Bilateral TAP.        Physical Exam:  /80 (BP Location: Right arm, Patient Position: Chair, Cuff Size: Adult Regular)   Pulse 94   Wt 59 kg (130 lb)   LMP  (LMP Unknown)   SpO2 96%   BMI 20.87 kg/m    General - Well nourished  Ears/Mouth -  Deferred given mask use during pandemic  Neck - no cervical lymphadenopathy  Lungs - Scattered wheezing  CVS - regular rhythm with no murmurs, rubs or gallups  Abdomen - soft, NT, ND, NABS  Ext - no cyanosis, clubbing or edema  Skin - no rash  Psychology - alert and oriented, answers appropriate        Electronically signed by:    Kerwin Barroso MD PhD  [facility]      Again, thank you for allowing me to participate in the care of your patient.      Sincerely,    Kerwin Barroso MD

## 2023-02-26 DIAGNOSIS — J45.30 MILD PERSISTENT ASTHMA WITHOUT COMPLICATION: ICD-10-CM

## 2023-02-27 RX ORDER — BUDESONIDE AND FORMOTEROL FUMARATE DIHYDRATE 80; 4.5 UG/1; UG/1
AEROSOL RESPIRATORY (INHALATION)
Qty: 30.6 G | Refills: 0 | Status: SHIPPED | OUTPATIENT
Start: 2023-02-27 | End: 2023-05-30

## 2023-03-23 DIAGNOSIS — I10 HYPERTENSION GOAL BP (BLOOD PRESSURE) < 140/90: ICD-10-CM

## 2023-03-23 DIAGNOSIS — N32.81 OVERACTIVE BLADDER: ICD-10-CM

## 2023-03-24 RX ORDER — TOLTERODINE 4 MG/1
CAPSULE, EXTENDED RELEASE ORAL
Qty: 90 CAPSULE | Refills: 0 | Status: SHIPPED | OUTPATIENT
Start: 2023-03-24 | End: 2023-06-22

## 2023-03-24 RX ORDER — AMLODIPINE BESYLATE 10 MG/1
TABLET ORAL
Qty: 90 TABLET | Refills: 0 | Status: SHIPPED | OUTPATIENT
Start: 2023-03-24 | End: 2023-06-22

## 2023-03-28 ENCOUNTER — TELEPHONE (OUTPATIENT)
Dept: FAMILY MEDICINE | Facility: CLINIC | Age: 68
End: 2023-03-28
Payer: COMMERCIAL

## 2023-03-28 NOTE — TELEPHONE ENCOUNTER
Patient Quality Outreach    Patient is due for the following:   Physical Annual Wellness Visit      Topic Date Due     COVID-19 Vaccine (5 - Booster for Pfizer series) 08/18/2022     Pneumococcal Vaccine (3 - PPSV23 if available, else PCV20) 09/20/2022       Next Steps:   Schedule a Annual Wellness Visit    Type of outreach:    Sent [employer] message.      Questions for provider review:    None     Erendira Graves [facility]  Chart routed to Care Team.

## 2023-03-31 DIAGNOSIS — Z96.641 STATUS POST TOTAL REPLACEMENT OF RIGHT HIP: ICD-10-CM

## 2023-03-31 RX ORDER — AMOXICILLIN 500 MG/1
2000 CAPSULE ORAL
Qty: 4 CAPSULE | Refills: 0 | Status: SHIPPED | OUTPATIENT
Start: 2023-03-31 | End: 2023-12-15

## 2023-03-31 NOTE — TELEPHONE ENCOUNTER
Patient is having a dental procedure on Wednesday, and the previous prescription was written a little over a years ago, her prescriber has retired and it seems like you are her new pcp.

## 2023-04-04 NOTE — TELEPHONE ENCOUNTER
Patient Quality Outreach    Patient is due for the following:   Physical Annual Wellness Visit    Next Steps:   Schedule a Annual Wellness Visit    Type of outreach:    [employer] message reviewed and no appointment scheduled at this time.    Next Steps:  Reach out within 90 days via [employer].    Max number of attempts reached: Yes. Will try again in 90 days if patient still on fail list.    Questions for provider review:    None     Erendira Graves, [facility]  Chart routed to Care Team.

## 2023-04-06 ENCOUNTER — MYC MEDICAL ADVICE (OUTPATIENT)
Dept: OTOLARYNGOLOGY | Facility: CLINIC | Age: 68
End: 2023-04-06
Payer: COMMERCIAL

## 2023-04-06 DIAGNOSIS — J45.40 MODERATE PERSISTENT ASTHMA WITHOUT COMPLICATION: ICD-10-CM

## 2023-04-06 DIAGNOSIS — J32.4 CHRONIC PANSINUSITIS: ICD-10-CM

## 2023-04-06 RX ORDER — BUDESONIDE 0.5 MG/2ML
INHALANT ORAL
Qty: 60 ML | Refills: 10 | Status: SHIPPED | OUTPATIENT
Start: 2023-04-06 | End: 2023-06-26

## 2023-04-06 NOTE — TELEPHONE ENCOUNTER
Called [facility] to determine how many fills patient has left. Currently has 11 on file. Notified pharmacy tech that patient will be transferring rx. Rx deactivated at [facility].  Remaining fills sent to [facility] 4/6/2023 2:23 PM

## 2023-04-27 DIAGNOSIS — M81.0 AGE RELATED OSTEOPOROSIS, UNSPECIFIED PATHOLOGICAL FRACTURE PRESENCE: ICD-10-CM

## 2023-04-27 RX ORDER — ALENDRONATE SODIUM 70 MG/1
TABLET ORAL
Qty: 12 TABLET | Refills: 0 | Status: SHIPPED | OUTPATIENT
Start: 2023-04-27 | End: 2023-06-26

## 2023-06-01 ENCOUNTER — HEALTH MAINTENANCE LETTER (OUTPATIENT)
Age: 68
End: 2023-06-01

## 2023-06-19 ASSESSMENT — ENCOUNTER SYMPTOMS
SHORTNESS OF BREATH: 0
COUGH: 0
PARESTHESIAS: 0
HEMATURIA: 0
ABDOMINAL PAIN: 0
FEVER: 0
DYSURIA: 0
HEMATOCHEZIA: 0
SORE THROAT: 0
PALPITATIONS: 0
ARTHRALGIAS: 0
FREQUENCY: 0
HEARTBURN: 0
NERVOUS/ANXIOUS: 0
HEADACHES: 0
DIARRHEA: 0
BREAST MASS: 0
MYALGIAS: 0
CONSTIPATION: 0
JOINT SWELLING: 0
CHILLS: 0
EYE PAIN: 0
NAUSEA: 1
WEAKNESS: 0
DIZZINESS: 0

## 2023-06-19 ASSESSMENT — ACTIVITIES OF DAILY LIVING (ADL): CURRENT_FUNCTION: NO ASSISTANCE NEEDED

## 2023-06-26 ENCOUNTER — OFFICE VISIT (OUTPATIENT)
Dept: FAMILY MEDICINE | Facility: CLINIC | Age: 68
End: 2023-06-26
Payer: COMMERCIAL

## 2023-06-26 VITALS
BODY MASS INDEX: 21.38 KG/M2 | DIASTOLIC BLOOD PRESSURE: 75 MMHG | RESPIRATION RATE: 16 BRPM | WEIGHT: 133 LBS | HEIGHT: 66 IN | SYSTOLIC BLOOD PRESSURE: 135 MMHG | TEMPERATURE: 98.4 F | OXYGEN SATURATION: 96 % | HEART RATE: 71 BPM

## 2023-06-26 DIAGNOSIS — J32.4 CHRONIC PANSINUSITIS: ICD-10-CM

## 2023-06-26 DIAGNOSIS — Z00.00 ENCOUNTER FOR MEDICARE ANNUAL WELLNESS EXAM: Primary | ICD-10-CM

## 2023-06-26 DIAGNOSIS — L82.0 INFLAMED SEBORRHEIC KERATOSIS: ICD-10-CM

## 2023-06-26 DIAGNOSIS — J45.30 MILD PERSISTENT ASTHMA WITHOUT COMPLICATION: ICD-10-CM

## 2023-06-26 DIAGNOSIS — I10 HYPERTENSION GOAL BP (BLOOD PRESSURE) < 140/90: ICD-10-CM

## 2023-06-26 DIAGNOSIS — N32.81 OVERACTIVE BLADDER: ICD-10-CM

## 2023-06-26 DIAGNOSIS — M81.0 AGE RELATED OSTEOPOROSIS, UNSPECIFIED PATHOLOGICAL FRACTURE PRESENCE: ICD-10-CM

## 2023-06-26 LAB
ANION GAP SERPL CALCULATED.3IONS-SCNC: 9 MMOL/L (ref 7–15)
BUN SERPL-MCNC: 12.3 MG/DL (ref 8–23)
CALCIUM SERPL-MCNC: 8.7 MG/DL (ref 8.8–10.2)
CHLORIDE SERPL-SCNC: 106 MMOL/L (ref 98–107)
CREAT SERPL-MCNC: 0.78 MG/DL (ref 0.51–0.95)
DEPRECATED HCO3 PLAS-SCNC: 26 MMOL/L (ref 22–29)
GFR SERPL CREATININE-BSD FRML MDRD: 83 ML/MIN/1.73M2
GLUCOSE SERPL-MCNC: 96 MG/DL (ref 70–99)
POTASSIUM SERPL-SCNC: 4.2 MMOL/L (ref 3.4–5.3)
SODIUM SERPL-SCNC: 141 MMOL/L (ref 136–145)

## 2023-06-26 PROCEDURE — 80048 BASIC METABOLIC PNL TOTAL CA: CPT | Performed by: FAMILY MEDICINE

## 2023-06-26 PROCEDURE — G0009 ADMIN PNEUMOCOCCAL VACCINE: HCPCS | Performed by: FAMILY MEDICINE

## 2023-06-26 PROCEDURE — G0438 PPPS, INITIAL VISIT: HCPCS | Performed by: FAMILY MEDICINE

## 2023-06-26 PROCEDURE — 17110 DESTRUCTION B9 LES UP TO 14: CPT | Performed by: FAMILY MEDICINE

## 2023-06-26 PROCEDURE — 90732 PPSV23 VACC 2 YRS+ SUBQ/IM: CPT | Performed by: FAMILY MEDICINE

## 2023-06-26 PROCEDURE — 36415 COLL VENOUS BLD VENIPUNCTURE: CPT | Performed by: FAMILY MEDICINE

## 2023-06-26 RX ORDER — ALBUTEROL SULFATE 90 UG/1
2 AEROSOL, METERED RESPIRATORY (INHALATION) EVERY 4 HOURS PRN
Qty: 18 G | Refills: 11 | Status: SHIPPED | OUTPATIENT
Start: 2023-06-26 | End: 2024-07-15

## 2023-06-26 RX ORDER — IPRATROPIUM BROMIDE AND ALBUTEROL SULFATE 2.5; .5 MG/3ML; MG/3ML
1 SOLUTION RESPIRATORY (INHALATION) EVERY 6 HOURS PRN
Qty: 90 ML | Refills: 4 | Status: SHIPPED | OUTPATIENT
Start: 2023-06-26 | End: 2024-07-15

## 2023-06-26 RX ORDER — BUDESONIDE AND FORMOTEROL FUMARATE DIHYDRATE 80; 4.5 UG/1; UG/1
AEROSOL RESPIRATORY (INHALATION)
Qty: 30.6 G | Refills: 3 | Status: SHIPPED | OUTPATIENT
Start: 2023-06-26 | End: 2024-07-15

## 2023-06-26 RX ORDER — AMLODIPINE BESYLATE 10 MG/1
TABLET ORAL
Qty: 90 TABLET | Refills: 3 | Status: SHIPPED | OUTPATIENT
Start: 2023-06-26 | End: 2024-07-05

## 2023-06-26 RX ORDER — TRIAMCINOLONE ACETONIDE 55 UG/1
1 SPRAY, METERED NASAL DAILY
COMMUNITY
Start: 2023-06-26

## 2023-06-26 RX ORDER — TOLTERODINE 4 MG/1
CAPSULE, EXTENDED RELEASE ORAL
Qty: 90 CAPSULE | Refills: 3 | Status: SHIPPED | OUTPATIENT
Start: 2023-06-26 | End: 2024-07-05

## 2023-06-26 RX ORDER — BUDESONIDE 0.5 MG/2ML
INHALANT ORAL
Qty: 60 ML | Refills: 10 | Status: SHIPPED | OUTPATIENT
Start: 2023-06-26 | End: 2024-04-29

## 2023-06-26 RX ORDER — ALENDRONATE SODIUM 70 MG/1
70 TABLET ORAL
Qty: 12 TABLET | Refills: 3 | Status: SHIPPED | OUTPATIENT
Start: 2023-06-26 | End: 2024-05-29

## 2023-06-26 ASSESSMENT — ENCOUNTER SYMPTOMS
ARTHRALGIAS: 0
WEAKNESS: 0
PARESTHESIAS: 0
COUGH: 0
PALPITATIONS: 0
ABDOMINAL PAIN: 0
NAUSEA: 1
HEADACHES: 0
SHORTNESS OF BREATH: 0
JOINT SWELLING: 0
DYSURIA: 0
SORE THROAT: 0
HEMATOCHEZIA: 0
NERVOUS/ANXIOUS: 0
FEVER: 0
CHILLS: 0
BREAST MASS: 0
DIARRHEA: 0
CONSTIPATION: 0
EYE PAIN: 0
DIZZINESS: 0
HEARTBURN: 0
MYALGIAS: 0
FREQUENCY: 0
HEMATURIA: 0

## 2023-06-26 ASSESSMENT — ACTIVITIES OF DAILY LIVING (ADL): CURRENT_FUNCTION: NO ASSISTANCE NEEDED

## 2023-06-26 NOTE — PATIENT INSTRUCTIONS
Patient Education   Personalized Prevention Plan  You are due for the preventive services outlined below.  Your care team is available to assist you in scheduling these services.  If you have already completed any of these items, please share that information with your care team to update in your medical record.  Health Maintenance Due   Topic Date Due     COVID-19 Vaccine (5 - Pfizer series) 08/18/2022     Pneumococcal Vaccine (3 - PPSV23 if available, else PCV20) 09/20/2022     Basic Metabolic Panel  03/21/2023     Asthma Action Plan - yearly  03/21/2023       Signs of Hearing Loss  Hearing loss is a problem shared by many people. In fact, it's one of the most common health problems, particularly as people age. Most people aged 65 and older have some hearing loss. By age 80, almost everyone does. Hearing loss often occurs slowly over the years. So, you may not realize your hearing has gotten worse.   When sudden hearing loss occurs, it's important to contact your healthcare provider right away. Your provider will do a medical exam and a hearing exam as soon as possible. This is to help find the cause and type of your sudden hearing loss. Based on your diagnosis, your healthcare provider will discuss possible treatments.      Hearing much better with one ear can be a sign of hearing loss.     Have your hearing checked  Call your healthcare provider if you:     Have to strain to hear normal conversation    Have to watch other people s faces very carefully to follow what they re saying    Need to ask people to repeat what they ve said    Often misunderstand what people are saying    Turn the volume of the television or radio up so high that others complain    Feel that people are mumbling when they re talking to you    Find that the effort to hear leaves you feeling tired and irritated    Notice, when using the phone, that you hear better with one ear than the other  Whit last reviewed this educational content  on 6/1/2022 2000-2022 [employer]. All rights reserved. This information is not intended as a substitute for professional medical care. Always follow your healthcare professional's instructions.          Urinary Incontinence, Female (Adult)   Urinary incontinence means loss of bladder control. This problem affects many women, especially as they get older. If you have incontinence, you may be embarrassed to ask for help. But know that this problem can be treated.   Types of Incontinence  There are different types of incontinence. Two of the main types are described here. You can have more than one type.     Stress incontinence. With this type, urine leaks when pressure (stress) is put on the bladder. This may happen when you cough, sneeze, or laugh. Stress incontinence most often occurs because the pelvic floor muscles that support the bladder and urethra are weak. This can happen after pregnancy and vaginal childbirth or a hysterectomy. It can also be due to excess body weight or hormone changes.    Urge incontinence (also called overactive bladder). With this type, a sudden urge to urinate is felt often. This may happen even though there may not be much urine in the bladder. The need to urinate often during the night is common. Urge incontinence most often occurs because of bladder spasms. This may be due to bladder irritation or infection. Damage to bladder nerves or pelvic muscles, constipation, and certain medicines can also lead to urge incontinence.  Treatment depends on the cause. Further evaluation is needed to find the type you have. This will likely include an exam and certain tests. Based on the results, you and your healthcare provider can then plan treatment. Until a diagnosis is made, the home care tips below can help ease symptoms.   Home care    Do pelvic floor muscle exercises, if they are prescribed. The pelvic floor muscles help support the bladder and urethra. Many women find  that their symptoms improve when doing special exercises that strengthen these muscles. To do the exercises, contract the muscles you would use to stop your stream of urine. But do this when you re not urinating. Hold for 10 seconds, then relax. Repeat 10 to 20 times in a row, at least 3 times a day. Your healthcare provider may give you other instructions for how to do the exercises and how often.    Keep a bladder diary. This helps track how often and how much you urinate over a set period of time. Bring this diary with you to your next visit with the provider. The information can help your provider learn more about your bladder problem.    Lose weight, if advised to by your provider. Extra weight puts pressure on the bladder. Your provider can help you create a weight-loss plan that s right for you. This may include exercising more and making certain diet changes.    Don't have foods and drinks that may irritate the bladder. These can include alcohol and caffeinated drinks.    Quit smoking. Smoking and other tobacco use can lead to a long-term (chronic) cough that strains the pelvic floor muscles. Smoking may also damage the bladder and urethra. Talk with your provider about treatments or methods you can use to quit smoking.    If drinking large amounts of fluid makes you have symptoms, you may be advised to limit your fluid intake. You may also be advised to drink most of your fluids during the day and to limit fluids at night.    If you re worried about urine leakage or accidents, you may wear absorbent pads to catch urine. Change the pads often. This helps reduce discomfort. It may also reduce the risk of skin or bladder infections.    Follow-up care  Follow up with your healthcare provider, or as directed. It may take some to find the right treatment for your problem. But healthy lifestyle changes can be made right away. These include such things as exercising on a regular basis, eating a healthy diet, losing  weight (if needed), and quitting smoking. Your treatment plan may include special therapies or medicines. Certain procedures or surgery may also be options. Talk about any questions you have with your provider.   When to seek medical advice  Call the healthcare provider right away if any of these occur:    Fever of 100.4 F (38 C) or higher, or as directed by your provider    Bladder pain or fullness    Belly swelling    Nausea or vomiting    Back pain    Weakness, dizziness, or fainting  Whit last reviewed this educational content on 1/1/2020 2000-2022 [employer]. All rights reserved. This information is not intended as a substitute for professional medical care. Always follow your healthcare professional's instructions.

## 2023-06-26 NOTE — PROGRESS NOTES
"SUBJECTIVE:   Ele is a 68 year old who presents for Preventive Visit.      6/26/2023     9:46 AM   Additional Questions   Roomed by Alicia GARIBAY CMA     Are you in the first 12 months of your Medicare coverage?  No    Hypertension well controlled on current medications without side effects, chest pain, or dyspnea. Patient has osteoporosis by prior DEXA scan in unknown control with medication. Overactive bladder controlled with medication without side effects.     She has irritated skin lesions on her back.     She is also followed by pulmonology and ENT for asthma and chronic sinusitis.     Healthy Habits:     In general, how would you rate your overall health?  Good    Frequency of exercise:  2-3 days/week    Duration of exercise:  30-45 minutes    Do you usually eat at least 4 servings of fruit and vegetables a day, include whole grains    & fiber and avoid regularly eating high fat or \"junk\" foods?  Yes    Taking medications regularly:  Yes    Medication side effects:  None    Ability to successfully perform activities of daily living:  No assistance needed    Home Safety:  Lack of grab bars in the bathroom    Hearing Impairment:  Difficulty following a conversation in a noisy restaurant or crowded room    In the past 6 months, have you been bothered by leaking of urine? Yes    In general, how would you rate your overall mental or emotional health?  Good      PHQ-2 Total Score: 0    Additional concerns today:  Yes        Have you ever done Advance Care Planning? (For example, a Health Directive, POLST, or a discussion with a medical provider or your loved ones about your wishes): No, advance care planning information given to patient to review.  Advanced care planning was discussed at today's visit.       Fall risk  Fallen 2 or more times in the past year?: No  Any fall with injury in the past year?: No    Cognitive Screening   1) Repeat 3 items (Leader, Season, Table)    2) Clock draw: NORMAL  3) 3 item recall: " Recalls 3 objects  Results: 3 items recalled: COGNITIVE IMPAIRMENT LESS LIKELY    Mini-CogTM Copyright MANISH Kingsley. Licensed by the author for use in [facility]; reprinted with permission (young@[facility]). All rights reserved.      Do you have sleep apnea, excessive snoring or daytime drowsiness?: no    Reviewed and updated as needed this visit by clinical staff   Tobacco  Allergies  Meds  Problems  Med Hx  Surg Hx  Fam Hx          Reviewed and updated as needed this visit by Provider   Tobacco  Allergies  Meds  Problems  Med Hx  Surg Hx  Fam Hx         Social History     Tobacco Use     Smoking status: Never     Smokeless tobacco: Never   Substance Use Topics     Alcohol use: Yes     Alcohol/week: 1.7 standard drinks of alcohol     Comment: 1-2 a month             6/19/2023    10:31 PM   Alcohol Use   Prescreen: >3 drinks/day or >7 drinks/week? No     Do you have a current opioid prescription? No  Do you use any other controlled substances or medications that are not prescribed by a provider? None     Current providers sharing in care for this patient include:   Patient Care Team:  Rosanna Lenz MD as PCP - General (Family Practice)  Rosanna Lenz MD as Assigned PCP  Harley Voss MD as MD (Critical Care)  Harley Voss MD as MD (Critical Care)  Dutch Ott MD as MD (Otolaryngology)  Kerwin Barroso MD as Assigned Pulmonology Provider  Dutch Ott MD as Assigned Surgical Provider    The following health maintenance items are reviewed in Epic and correct as of today:  Health Maintenance   Topic Date Due     COVID-19 Vaccine (5 - Pfizer series) 08/18/2022     BMP  03/21/2023     ASTHMA CONTROL TEST  08/20/2023     MAMMO SCREENING  01/14/2024     MEDICARE ANNUAL WELLNESS VISIT  06/26/2024     ANNUAL REVIEW OF HM ORDERS  06/26/2024     ASTHMA ACTION PLAN  06/26/2024     FALL RISK ASSESSMENT  06/26/2024     PAP FOLLOW-UP  03/21/2025      HPV FOLLOW-UP  03/21/2025     COLORECTAL CANCER SCREENING  03/01/2026     DEXA  01/14/2027     LIPID  03/21/2027     ADVANCE CARE PLANNING  06/26/2028     DTAP/TDAP/TD IMMUNIZATION (3 - Td or Tdap) 06/23/2032     HEPATITIS C SCREENING  Completed     PHQ-2 (once per calendar year)  Completed     INFLUENZA VACCINE  Completed     Pneumococcal Vaccine: 65+ Years  Completed     ZOSTER IMMUNIZATION  Completed     IPV IMMUNIZATION  Aged Out     MENINGITIS IMMUNIZATION  Aged Out     PAP  Discontinued     Patient Active Problem List   Diagnosis     Mild persistent asthma     Overactive bladder     History of cervical cancer     Allergic rhinitis     Hypertension goal BP (blood pressure) < 140/90     Nasal polyposis     Degenerative joint disease of right hip     Osteoporosis     Posterior vitreous detachment of right eye     Suspected glaucoma of both eyes     Past Surgical History:   Procedure Laterality Date     COLONOSCOPY WITH CO2 INSUFFLATION N/A 04/12/2017    Procedure: COLONOSCOPY WITH CO2 INSUFFLATION;  Surgeon: Anatoly Do MD;  Location:  OR     COLPOSCOPY,LOOP ELECTRD CERVIX EXCIS  2000    Radiation & chemotherapy for cervical cancer. Diagnosed in 2000     CYSTOSCOPY N/A 11/30/2018    Procedure: CYSTOSCOPY;  Surgeon: Genesis Hicks MD;  Location: [facility] OR      CORRECT BUNION,SIMPLE      ESTEPHANIE     LAPAROSCOPIC SALPINGO-OOPHORECTOMY Bilateral 11/30/2018    Procedure: Laparoscopic Bilateral Salpingo-Oophorectomy, Pelvic washings, cystoscopy;  Surgeon: Genesis Hicks MD;  Location: [facility] OR     SINUS SURGERY      x 2, Nasal polyps removed       Social History     Tobacco Use     Smoking status: Never     Smokeless tobacco: Never   Substance Use Topics     Alcohol use: Yes     Alcohol/week: 1.7 standard drinks of alcohol     Comment: 1-2 a month     Family History   Problem Relation Age of Onset     Glaucoma Mother      Hypertension Father      Asthma Father      Pacemaker Father      Cancer Maternal  "Grandmother         pelvic     Ovarian Cancer Maternal Grandmother      Diabetes Maternal Grandfather      Coronary Artery Disease Paternal Grandmother      Allergies Brother      Neurologic Disorder Sister         brain aneurysm     Cerebrovascular Disease Sister      C.A.D. No family hx of          Current Outpatient Medications   Medication Sig Dispense Refill     albuterol (PROAIR HFA/PROVENTIL HFA/VENTOLIN HFA) 108 (90 Base) MCG/ACT inhaler Inhale 2 puffs into the lungs every 4 hours as needed for shortness of breath 18 g 11     alendronate (FOSAMAX) 70 MG tablet Take 1 tablet (70 mg) by mouth every 7 days 12 tablet 3     amLODIPine (NORVASC) 10 MG tablet TAKE 1 TABLET DAILY 90 tablet 3     amoxicillin (AMOXIL) 500 MG capsule Take 4 capsules (2,000 mg) by mouth once as needed (take 30-60 minutes prior to any dental work/cleaning) 4 capsule 0     budesonide (PULMICORT) 0.5 MG/2ML neb solution Add one ampule into [employer] sinus rinse bottle with saline mixture and irrigate nose daily. 60 mL 10     budesonide-formoterol (SYMBICORT) 80-4.5 MCG/ACT Inhaler USE 2 INHALATIONS TWICE A DAY 30.6 g 3     cetirizine (ZYRTEC) 10 MG tablet Take 1 tablet by mouth daily as needed for allergies.       ipratropium - albuterol 0.5 mg/2.5 mg/3 mL (DUONEB) 0.5-2.5 (3) MG/3ML neb solution Take 1 vial (3 mLs) by nebulization every 6 hours as needed for shortness of breath or wheezing 90 mL 4     MULTI-VITAMIN OR Take 1 tablet by mouth daily       Omega-3 Fatty Acids (FISH OIL) 500 MG CAPS Take by mouth daily 1 daily       tolterodine ER (DETROL LA) 4 MG 24 hr capsule TAKE 1 CAPSULE DAILY 90 capsule 3     triamcinolone (NASACORT ALLERGY 24HR) 55 MCG/ACT nasal aerosol Spray 1 spray into both nostrils daily       Allergies   Allergen Reactions     Fluticasone Other (See Comments)     Sneezing     Citalopram Other (See Comments)     Thinking changes  \"Thinking changes\"     Flonase [Fluticasone Propionate]      Sulfa Antibiotics Rash     " "Sulfasalazine Rash             3/20/2022     9:20 PM   Breast CA Risk Assessment (FHS-7)   Do you have a family history of breast, colon, or ovarian cancer? No / Unknown          Mammogram Screening: Recommended mammography every 1-2 years with patient discussion and risk factor consideration  Pertinent mammograms are reviewed under the imaging tab.    Review of Systems   Constitutional: Negative for chills and fever.   HENT: Negative for congestion, ear pain, hearing loss and sore throat.    Eyes: Negative for pain and visual disturbance.   Respiratory: Negative for cough and shortness of breath.    Cardiovascular: Positive for peripheral edema. Negative for chest pain and palpitations.   Gastrointestinal: Positive for nausea. Negative for abdominal pain, constipation, diarrhea, heartburn and hematochezia.   Breasts:  Negative for tenderness, breast mass and discharge.   Genitourinary: Negative for dysuria, frequency, genital sores, hematuria, pelvic pain, urgency, vaginal bleeding and vaginal discharge.   Musculoskeletal: Negative for arthralgias, joint swelling and myalgias.   Skin: Negative for rash.   Neurological: Negative for dizziness, weakness, headaches and paresthesias.   Psychiatric/Behavioral: Negative for mood changes. The patient is not nervous/anxious.          OBJECTIVE:   /75 (BP Location: Right arm, Patient Position: Sitting, Cuff Size: Adult Regular)   Pulse 71   Temp 98.4  F (36.9  C) (Oral)   Resp 16   Ht 1.682 m (5' 6.22\")   Wt 60.3 kg (133 lb)   LMP  (LMP Unknown)   SpO2 96%   BMI 21.32 kg/m   Estimated body mass index is 21.32 kg/m  as calculated from the following:    Height as of this encounter: 1.682 m (5' 6.22\").    Weight as of this encounter: 60.3 kg (133 lb).  Physical Exam  GENERAL: healthy, alert and no distress  EYES: Eyes grossly normal to inspection, PERRL and conjunctivae and sclerae normal  HENT: ear canals and TM's normal, nose and mouth without ulcers or " lesions  NECK: no adenopathy, no asymmetry, masses, or scars and thyroid normal to palpation  RESP: lungs clear to auscultation - no rales, rhonchi or wheezes  CV: regular rate and rhythm, normal S1 S2, no S3 or S4, no murmur, click or rub, no peripheral edema   MS: no gross musculoskeletal defects noted, no edema  SKIN: keratoses - seborrheic - numerous on back   PSYCH: mentation appears normal, affect normal/bright    Diagnostic Test Results:  Labs reviewed in Epic    ASSESSMENT / PLAN:   (Z00.00) Encounter for Medicare annual wellness exam  (primary encounter diagnosis)    (I10) Hypertension goal BP (blood pressure) < 140/90  Comment: Well controlled with medications without side effects.   Plan: BASIC METABOLIC PANEL, amLODIPine (NORVASC) 10         MG tablet, OFFICE/OUTPT VISIT,EST,LEVL IV          (M81.0) Age related osteoporosis, unspecified pathological fracture presence  Plan: alendronate (FOSAMAX) 70 MG tablet,         OFFICE/OUTPT VISIT,EST,LEVL IV        Plan 5 year course of medication. Continue vitamin D supplement, regular exercise, falls precautions and DEXA bone scan every 2 - 3 years.     (N32.81) Overactive bladder  Comment: Well controlled with medications without side effects.   Plan: tolterodine ER (DETROL LA) 4 MG 24 hr capsule,         OFFICE/OUTPT VISIT,EST,LEVL IV          (J45.30) Mild persistent asthma without complication  Comment: Well controlled with medications without side effects.   Plan: budesonide-formoterol (SYMBICORT) 80-4.5         MCG/ACT Inhaler, ipratropium - albuterol 0.5         mg/2.5 mg/3 mL (DUONEB) 0.5-2.5 (3) MG/3ML neb         solution, albuterol (PROAIR HFA/PROVENTIL         HFA/VENTOLIN HFA) 108 (90 Base) MCG/ACT         inhaler, OFFICE/OUTPT VISIT,EST,LEVL IV          (J32.4) Chronic pansinusitis  Comment: Well controlled with medications without side effects.   Plan: budesonide (PULMICORT) 0.5 MG/2ML neb solution         (L82.0) Inflamed seborrheic  keratosis  Comment: back   Plan: DESTRUCT BENIGN LESION, UP TO 14        Liquid nitrogen was applied for 5 -10 seconds x 2 to the skin lesions x 6 and the expected blistering or scabbing reaction explained. Patient reminded to expect hypopigmented scars from the procedure. Return if lesions fail to fully resolve.        Patient has been advised of split billing requirements and indicates understanding: Yes      COUNSELING:  Reviewed preventive health counseling, as reflected in patient instructions  Special attention given to:       Regular exercise       Healthy diet/nutrition       Bladder control       Osteoporosis prevention/bone health       Colon cancer screening       The 10-year ASCVD risk score (Lorna RODRÍGUEZ, et al., 2019) is: 9.1%    Values used to calculate the score:      Age: 68 years      Sex: Female      Is Non- : No      Diabetic: No      Tobacco smoker: No      Systolic Blood Pressure: 135 mmHg      Is BP treated: Yes      HDL Cholesterol: 87 mg/dL      Total Cholesterol: 219 mg/dL        She reports that she has never smoked. She has never used smokeless tobacco.      Appropriate preventive services were discussed with this patient, including applicable screening as appropriate for cardiovascular disease, diabetes, osteopenia/osteoporosis, and glaucoma.  As appropriate for age/gender, discussed screening for colorectal cancer, prostate cancer, breast cancer, and cervical cancer. Checklist reviewing preventive services available has been given to the patient.    Reviewed patients plan of care and provided an AVS. The Intermediate Care Plan ( asthma action plan, low back pain action plan, and migraine action plan) for Wendy meets the Care Plan requirement. This Care Plan has been established and reviewed with the Patient.          Rosanna Lenz MD  [facility]    Identified Health Risks:    I have reviewed Opioid Use Disorder and Substance Use Disorder  risk factors and made any needed referrals.       The patient was provided with written information regarding signs of hearing loss.  Information on urinary incontinence and treatment options given to patient.

## 2023-06-26 NOTE — LETTER
My Asthma Action Plan    Name: Wendy Francis   YOB: 1955  Date: 6/26/2023   My doctor: Rosanna Lenz MD   My clinic: [facility]        My Control Medicine: Budesonide + formoterol (Symbicort HFA) -  80/4.5 mcg twice daily  My Rescue Medicine: Albuterol (Proair/Ventolin/Proventil HFA) 2-4 puffs EVERY 4 HOURS as needed. Use a spacer if recommended by your provider.   My Asthma Severity:   Mild Persistent  Know your asthma triggers: upper respiratory infections               GREEN ZONE   Good Control  I feel good  No cough or wheeze  Can work, sleep and play without asthma symptoms       Take your asthma control medicine every day.     If exercise triggers your asthma, take your rescue medication  15 minutes before exercise or sports, and  During exercise if you have asthma symptoms  Spacer to use with inhaler: If you have a spacer, make sure to use it with your inhaler             YELLOW ZONE Getting Worse  I have ANY of these:  I do not feel good  Cough or wheeze  Chest feels tight  Wake up at night   Keep taking your Green Zone medications  Start taking your rescue medicine:  every 20 minutes for up to 1 hour. Then every 4 hours for 24-48 hours.  If you stay in the Yellow Zone for more than 12-24 hours, contact your doctor.  If you do not return to the Green Zone in 12-24 hours or you get worse, start taking your oral steroid medicine if prescribed by your provider.           RED ZONE Medical Alert - Get Help  I have ANY of these:  I feel awful  Medicine is not helping  Breathing getting harder  Trouble walking or talking  Nose opens wide to breathe       Take your rescue medicine NOW  If your provider has prescribed an oral steroid medicine, start taking it NOW  Call your doctor NOW  If you are still in the Red Zone after 20 minutes and you have not reached your doctor:  Take your rescue medicine again and  Call 911 or go to the emergency room right away    See your  regular doctor within 2 weeks of an Emergency Room or Urgent Care visit for follow-up treatment.          Annual Reminders:  Meet with Asthma Educator,  Flu Shot in the Fall, consider Pneumonia Vaccination for patients with asthma (aged 19 and older).    Pharmacy:    [facility] [place]  [place] [facility] [address]  [facility] - [address]    Electronically signed by Rosanna Lenz MD   Date: 06/26/23                      Asthma Triggers  How To Control Things That Make Your Asthma Worse    Triggers are things that make your asthma worse.  Look at the list below to help you find your triggers and what you can do about them.  You can help prevent asthma flare-ups by staying away from your triggers.      Trigger                                                          What you can do   Cigarette Smoke  Tobacco smoke can make asthma worse. Do not allow smoking in your home, car or around you.  Be sure no one smokes at a child s day care or school.  If you smoke, ask your health care provider for ways to help you quit.  Ask family members to quit too.  Ask your health care provider for a referral to Quit Plan to help you quit smoking, or call 6-962-438-PLAN.     Colds, Flu, Bronchitis  These are common triggers of asthma. Wash your hands often.  Don t touch your eyes, nose or mouth.  Get a flu shot every year.     Dust Mites  These are tiny bugs that live in cloth or carpet. They are too small to see. Wash sheets and blankets in hot water every week.   Encase pillows and mattress in dust mite proof covers.  Avoid having carpet if you can. If you have carpet, vacuum weekly.   Use a dust mask and HEPA vacuum.   Pollen and Outdoor Mold  Some people are allergic to trees, grass, or weed pollen, or molds. Try to keep your windows closed.  Limit time out doors when pollen count is high.   Ask you health care provider about  taking medicine during allergy season.     Animal Dander  Some people are allergic to skin flakes, urine or saliva from pets with fur or feathers. Keep pets with fur or feathers out of your home.    If you can t keep the pet outdoors, then keep the pet out of your bedroom.  Keep the bedroom door closed.  Keep pets off cloth furniture and away from stuffed toys.     Mice, Rats, and Cockroaches   Some people are allergic to the waste from these pests.   Cover food and garbage.  Clean up spills and food crumbs.  Store grease in the refrigerator.   Keep food out of the bedroom.   Indoor Mold  This can be a trigger if your home has high moisture. Fix leaking faucets, pipes, or other sources of water.   Clean moldy surfaces.  Dehumidify basement if it is damp and smelly.   Smoke, Strong Odors, and Sprays  These can reduce air quality. Stay away from strong odors and sprays, such as perfume, powder, hair spray, paints, smoke incense, paint, cleaning products, candles and new carpet.   Exercise or Sports  Some people with asthma have this trigger. Be active!  Ask your doctor about taking medicine before sports or exercise to prevent symptoms.    Warm up for 5-10 minutes before and after sports or exercise.     Other Triggers of Asthma  Cold air:  Cover your nose and mouth with a scarf.  Sometimes laughing or crying can be a trigger.  Some medicines and food can trigger asthma.

## 2023-06-26 NOTE — NURSING NOTE
Prior to immunization administration, verified patients identity using patient s name and date of birth. Please see Immunization Activity for additional information.     Screening Questionnaire for Adult Immunization    Are you sick today?   No   Do you have allergies to medications, food, a vaccine component or latex?   No   Have you ever had a serious reaction after receiving a vaccination?   No   Do you have a long-term health problem with heart, lung, kidney, or metabolic disease (e.g., diabetes), asthma, a blood disorder, no spleen, complement component deficiency, a cochlear implant, or a spinal fluid leak?  Are you on long-term aspirin therapy?   Yes   Do you have cancer, leukemia, HIV/AIDS, or any other immune system problem?   No   Do you have a parent, brother, or sister with an immune system problem?   No   In the past 3 months, have you taken medications that affect  your immune system, such as prednisone, other steroids, or anticancer drugs; drugs for the treatment of rheumatoid arthritis, Crohn s disease, or psoriasis; or have you had radiation treatments?   No   Have you had a seizure, or a brain or other nervous system problem?   No   During the past year, have you received a transfusion of blood or blood    products, or been given immune (gamma) globulin or antiviral drug?   No   For women: Are you pregnant or is there a chance you could become       pregnant during the next month?   No   Have you received any vaccinations in the past 4 weeks?   No     Immunization questionnaire was positive for at least one answer.  Notified Dr. Lenz.      Patient instructed to remain in clinic for 15 minutes afterwards, and to report any adverse reactions.     Screening performed by Erendira Graves CMA on 6/26/2023 at 10:51 AM.

## 2023-09-21 ENCOUNTER — OFFICE VISIT (OUTPATIENT)
Dept: OPTOMETRY | Facility: CLINIC | Age: 68
End: 2023-09-21
Payer: COMMERCIAL

## 2023-09-21 DIAGNOSIS — Z01.01 ENCOUNTER FOR EXAMINATION OF EYES AND VISION WITH ABNORMAL FINDINGS: Primary | ICD-10-CM

## 2023-09-21 DIAGNOSIS — H43.811 PVD (POSTERIOR VITREOUS DETACHMENT), RIGHT: ICD-10-CM

## 2023-09-21 DIAGNOSIS — H52.223 REGULAR ASTIGMATISM OF BOTH EYES: ICD-10-CM

## 2023-09-21 DIAGNOSIS — H52.03 HYPERMETROPIA, BILATERAL: ICD-10-CM

## 2023-09-21 DIAGNOSIS — H52.4 PRESBYOPIA: ICD-10-CM

## 2023-09-21 DIAGNOSIS — H40.003 GLAUCOMA SUSPECT, BILATERAL: ICD-10-CM

## 2023-09-21 PROCEDURE — 92014 COMPRE OPH EXAM EST PT 1/>: CPT | Performed by: OPTOMETRIST

## 2023-09-21 PROCEDURE — 92015 DETERMINE REFRACTIVE STATE: CPT | Performed by: OPTOMETRIST

## 2023-09-21 ASSESSMENT — CONF VISUAL FIELD
OS_SUPERIOR_NASAL_RESTRICTION: 0
METHOD: COUNTING FINGERS
OS_SUPERIOR_TEMPORAL_RESTRICTION: 0
OD_INFERIOR_TEMPORAL_RESTRICTION: 0
OS_INFERIOR_TEMPORAL_RESTRICTION: 0
OS_INFERIOR_NASAL_RESTRICTION: 0
OD_INFERIOR_NASAL_RESTRICTION: 0
OD_SUPERIOR_NASAL_RESTRICTION: 0
OD_NORMAL: 1
OD_SUPERIOR_TEMPORAL_RESTRICTION: 0
OS_NORMAL: 1

## 2023-09-21 ASSESSMENT — VISUAL ACUITY
OS_CC: 20/30-2
OD_CC: 20/20-2
OD_SC+: -1
METHOD: SNELLEN - LINEAR
OS_SC: 20/120-1
OD_CC: 20/20
OD_SC: 20/30
OS_SC: 20/40
CORRECTION_TYPE: GLASSES
OS_CC: 20/20
OD_SC: 20/200
OS_CC+: -2

## 2023-09-21 ASSESSMENT — CUP TO DISC RATIO
OD_RATIO: 0.3
OS_RATIO: 0.4

## 2023-09-21 ASSESSMENT — REFRACTION_MANIFEST
OD_CYLINDER: +1.00
OS_SPHERE: +0.50
OS_CYLINDER: +1.25
OS_AXIS: 176
OD_ADD: +2.50
OS_ADD: +2.50
OD_SPHERE: +0.25
OD_AXIS: 177

## 2023-09-21 ASSESSMENT — TONOMETRY
OD_IOP_MMHG: 13
OS_IOP_MMHG: 12
IOP_METHOD: APPLANATION

## 2023-09-21 ASSESSMENT — REFRACTION_WEARINGRX
SPECS_TYPE: PAL
OS_AXIS: 180
OS_CYLINDER: +1.00
OD_CYLINDER: +1.50
OD_ADD: +2.25
OD_SPHERE: -0.25
OD_AXIS: 176
OS_SPHERE: +0.25
OS_ADD: +2.25

## 2023-09-21 ASSESSMENT — EXTERNAL EXAM - LEFT EYE: OS_EXAM: 2+ BROW PTOSIS

## 2023-09-21 ASSESSMENT — EXTERNAL EXAM - RIGHT EYE: OD_EXAM: 2+ BROW PTOSIS

## 2023-09-21 NOTE — LETTER
"    9/21/2023         RE: Wendy Francis  [address] [place] [id number]        Dear Colleague,    Thank you for referring your patient, Wendy Francis, to the [facility]. Please see a copy of my visit note below.    Chief Complaint   Patient presents with     Annual Eye Exam     Glaucoma Suspect Follow Up      -Glaucoma suspect (family hx - mother), NFL thinning L>R, asymmetric C/D ratio    -Floaters each eye - none new (-)flashes/curtain effect       Last Eye Exam: 7/7/2022 Dr. Larios  Dilated Previously: Yes, side effects of dilation explained today    What are you currently using to see?  Glasses - PAL's - wears full time        Distance Vision Acuity: Satisfied with vision    Near Vision Acuity: Not satisfied - relying more on magnifying glass for fine print     Eye Comfort: good  Do you use eye drops? : No  Occupation or Hobbies: Retired    Tatyana Canas  Optometry Assistant      Medical, surgical and family histories reviewed and updated 9/21/2023.       OBJECTIVE: See Ophthalmology exam    ASSESSMENT:    ICD-10-CM    1. Encounter for examination of eyes and vision with abnormal findings  Z01.01       2. Glaucoma suspect, bilateral  H40.003       3. PVD (posterior vitreous detachment), right  H43.811       4. Hypermetropia, bilateral  H52.03       5. Regular astigmatism of both eyes  H52.223       6. Presbyopia  H52.4           PLAN:     Patient Instructions   Previously followed as glaucoma suspect by Dr. Larios. Last testing July 2022.   Return for glaucoma testing with Dr. Larios.     You have a PVD- posterior vitreous detachment which is due to the gel of the eye shrinking and clumping together.  This can sometimes cause holes or tears in the retina.  The signs of a retinal detachment are flashes of light or a \"curtain veil\" coming over your vision. If you notice any of these changes return to clinic for re-evaluation.     Updated glasses prescription provided " today. Optional to fill.       The effects of the dilating drops last for 4- 6 hours.  You will be more sensitive to light and vision will be blurry up close.  Mydriatic sunglasses were given if needed.     Brice Oleary OD  [address]  [id number]    [phone]       Again, thank you for allowing me to participate in the care of your patient.        Sincerely,        Brice Oleary OD

## 2023-09-21 NOTE — PROGRESS NOTES
"Chief Complaint   Patient presents with    Annual Eye Exam    Glaucoma Suspect Follow Up      -Glaucoma suspect (family hx - mother), NFL thinning L>R, asymmetric C/D ratio    -Floaters each eye - none new (-)flashes/curtain effect       Last Eye Exam: 7/7/2022 Dr. Larios  Dilated Previously: Yes, side effects of dilation explained today    What are you currently using to see?  Glasses - PAL's - wears full time        Distance Vision Acuity: Satisfied with vision    Near Vision Acuity: Not satisfied - relying more on magnifying glass for fine print     Eye Comfort: good  Do you use eye drops? : No  Occupation or Hobbies: Retired    Tatyana Canas  Optometry Assistant      Medical, surgical and family histories reviewed and updated 9/21/2023.       OBJECTIVE: See Ophthalmology exam    ASSESSMENT:    ICD-10-CM    1. Encounter for examination of eyes and vision with abnormal findings  Z01.01       2. Glaucoma suspect, bilateral  H40.003       3. PVD (posterior vitreous detachment), right  H43.811       4. Hypermetropia, bilateral  H52.03       5. Regular astigmatism of both eyes  H52.223       6. Presbyopia  H52.4           PLAN:     Patient Instructions   Previously followed as glaucoma suspect by Dr. Larios. Last testing July 2022.   Return for glaucoma testing with Dr. Larios.     You have a PVD- posterior vitreous detachment which is due to the gel of the eye shrinking and clumping together.  This can sometimes cause holes or tears in the retina.  The signs of a retinal detachment are flashes of light or a \"curtain veil\" coming over your vision. If you notice any of these changes return to clinic for re-evaluation.     Updated glasses prescription provided today. Optional to fill.       The effects of the dilating drops last for 4- 6 hours.  You will be more sensitive to light and vision will be blurry up close.  Mydriatic sunglasses were given if needed.     Brice Oleary, [facility] - " [place]  [address]  [place]  [address]    [phone]

## 2023-09-21 NOTE — PATIENT INSTRUCTIONS
"Previously followed as glaucoma suspect by Dr. Larios. Last testing July 2022.   Return for glaucoma testing with Dr. Larios.     You have a PVD- posterior vitreous detachment which is due to the gel of the eye shrinking and clumping together.  This can sometimes cause holes or tears in the retina.  The signs of a retinal detachment are flashes of light or a \"curtain veil\" coming over your vision. If you notice any of these changes return to clinic for re-evaluation.     Updated glasses prescription provided today. Optional to fill.       The effects of the dilating drops last for 4- 6 hours.  You will be more sensitive to light and vision will be blurry up close.  Mydriatic sunglasses were given if needed.     Brice Oleary, OD  [address]. [place]  [address]    [phone]   "

## 2023-12-15 ENCOUNTER — PATIENT OUTREACH (OUTPATIENT)
Dept: CARE COORDINATION | Facility: CLINIC | Age: 68
End: 2023-12-15
Payer: COMMERCIAL

## 2023-12-15 ENCOUNTER — MYC REFILL (OUTPATIENT)
Dept: FAMILY MEDICINE | Facility: CLINIC | Age: 68
End: 2023-12-15
Payer: COMMERCIAL

## 2023-12-15 DIAGNOSIS — Z96.641 STATUS POST TOTAL REPLACEMENT OF RIGHT HIP: ICD-10-CM

## 2023-12-15 RX ORDER — AMOXICILLIN 500 MG/1
2000 CAPSULE ORAL
Qty: 4 CAPSULE | Refills: 1 | Status: SHIPPED | OUTPATIENT
Start: 2023-12-15 | End: 2024-07-15

## 2024-01-05 ENCOUNTER — OFFICE VISIT (OUTPATIENT)
Dept: OPHTHALMOLOGY | Facility: CLINIC | Age: 69
End: 2024-01-05
Payer: COMMERCIAL

## 2024-01-05 DIAGNOSIS — H40.003 GLAUCOMA SUSPECT OF BOTH EYES: Primary | ICD-10-CM

## 2024-01-05 PROCEDURE — 92012 INTRM OPH EXAM EST PATIENT: CPT | Performed by: STUDENT IN AN ORGANIZED HEALTH CARE EDUCATION/TRAINING PROGRAM

## 2024-01-05 PROCEDURE — 92133 CPTRZD OPH DX IMG PST SGM ON: CPT | Performed by: STUDENT IN AN ORGANIZED HEALTH CARE EDUCATION/TRAINING PROGRAM

## 2024-01-05 PROCEDURE — 92083 EXTENDED VISUAL FIELD XM: CPT | Performed by: STUDENT IN AN ORGANIZED HEALTH CARE EDUCATION/TRAINING PROGRAM

## 2024-01-05 ASSESSMENT — EXTERNAL EXAM - LEFT EYE: OS_EXAM: 2+ BROW PTOSIS

## 2024-01-05 ASSESSMENT — TONOMETRY
OD_IOP_MMHG: 15
OS_IOP_MMHG: 16
IOP_METHOD: APPLANATION

## 2024-01-05 ASSESSMENT — EXTERNAL EXAM - RIGHT EYE: OD_EXAM: 2+ BROW PTOSIS

## 2024-01-05 ASSESSMENT — VISUAL ACUITY
METHOD: SNELLEN - LINEAR
OD_CC+: -1
CORRECTION_TYPE: GLASSES
OD_CC: 20/20
OS_CC: 20/20

## 2024-01-05 NOTE — LETTER
1/5/2024         RE: Wendy Francis  [address] [place] [id number]        Dear Colleague,    Thank you for referring your patient, Wendy Francis, to the [facility]. Please see a copy of my visit note below.     Current Eye Medications:  none     Subjective: Here for intraocular pressure check, glaucoma OCT, and Blakely Visual Field for glaucoma suspect. Referred by Dr. Oleary.  No vision issues noted, mother had glaucoma.      Objective:  See Ophthalmology Exam.       Assessment:  Wendy Francis is a 68 year old female who presents with:   Encounter Diagnosis   Name Primary?     Glaucoma suspect of both eyes Intraocular pressure 15/16 today. Continue monitoring.    OCT optic nerve: avg retinal nerve fiber layer 84/79; within normal limits and stable right eye; borderline temp and stable left eye     Blakely visual field (HVF) 24-2; within normal limits right eye, stable inf nasal step left eye        Plan:  Continue monitoring for glaucoma suspicion    Renetta Travis MD  [phone]       Again, thank you for allowing me to participate in the care of your patient.        Sincerely,        Renetta Travis MD

## 2024-01-05 NOTE — PROGRESS NOTES
Current Eye Medications:  none     Subjective: Here for intraocular pressure check, glaucoma OCT, and Blakely Visual Field for glaucoma suspect. Referred by Dr. Oleary.  No vision issues noted, mother had glaucoma.      Objective:  See Ophthalmology Exam.       Assessment:  Wendy Francis is a 68 year old female who presents with:   Encounter Diagnosis   Name Primary?    Glaucoma suspect of both eyes Intraocular pressure 15/16 today. Continue monitoring.    OCT optic nerve: avg retinal nerve fiber layer 84/79; within normal limits and stable right eye; borderline temp and stable left eye     Blakely visual field (HVF) 24-2; within normal limits right eye, stable inf nasal step left eye        Plan:  Continue monitoring for glaucoma suspicion    Renetta Travis MD  [phone]

## 2024-01-12 ENCOUNTER — PATIENT OUTREACH (OUTPATIENT)
Dept: CARE COORDINATION | Facility: CLINIC | Age: 69
End: 2024-01-12
Payer: COMMERCIAL

## 2024-02-06 ENCOUNTER — TELEPHONE (OUTPATIENT)
Dept: FAMILY MEDICINE | Facility: CLINIC | Age: 69
End: 2024-02-06

## 2024-02-06 ENCOUNTER — VIRTUAL VISIT (OUTPATIENT)
Dept: URGENT CARE | Facility: CLINIC | Age: 69
End: 2024-02-06
Payer: COMMERCIAL

## 2024-02-06 DIAGNOSIS — U07.1 INFECTION DUE TO 2019 NOVEL CORONAVIRUS: Primary | ICD-10-CM

## 2024-02-06 PROCEDURE — 99441 PR PHYSICIAN TELEPHONE EVALUATION 5-10 MIN: CPT | Mod: 93

## 2024-02-06 NOTE — TELEPHONE ENCOUNTER
Patient reports testing positive for COVID today. Thought she was coming down with a bad cold.   Saturday was first symptoms. Patient in stable condition and seeking paxlovid.     RN COVID TREATMENT VISIT  02/06/24      The patient has been triaged and does not require a higher level of care.    Wendy Negreteien  68 year old  Current weight? 130 lbs     Has the patient been seen by a primary care provider at an [facility] or [facility] within the past two years? Yes.   Have you been in close proximity to/do you have a known exposure to a person with a confirmed case of influenza? No.     General treatment eligibility:  Date of positive COVID test (PCR or at home)?  2/6/24    Are you or have you been hospitalized for this COVID-19 infection? No.   Have you received monoclonal antibodies or antiviral treatment for COVID-19 since this positive test? No.   Do you have any of the following conditions that place you at risk of being very sick from COVID-19?   - Age 50 years or older  - Chronic lung diseases such as asthma, bronchiectasis, COPD, interstitial lung disease, pulmonary embolism, pulmonary hypertension   Yes, patient has at least one high risk condition as noted above.     Current COVID symptoms:   - fatigue  - muscle or body aches  - congestion or runny nose  Yes. Patient has at least one symptom as selected.     How many days since symptoms started? 5 days or less. Established patient, 12 years or older weighing at least 88.2 lbs, who has symptoms that started in the past 5 days, has not been hospitalized nor received treatment already, and is at risk for being very sick from COVID-19.     Treatment eligibility by RN:  Are you currently pregnant or nursing? No  Do you have a clinically significant hypersensitivity to nirmatrelvir or ritonavir, or toxic epidermal necrolysis (TEN) or Richards-Tonny Syndrome? No  Do you have a history of hepatitis, any hepatic impairment on the  Problem List (such as Child-Gabriel Class C, cirrhosis, fatty liver disease, alcoholic liver disease), or was the last liver lab (hepatic panel, ALT, AST, ALK Phos, bilirubin) elevated in the past 6 months? No  Do you have any history of severe renal impairment (eGFR < 30mL/min)? No    Is patient eligible to continue? Yes, patient meets all eligibility requirements for the RN COVID treatment (as denoted by all no responses above).     Current Outpatient Medications   Medication Sig Dispense Refill    albuterol (PROAIR HFA/PROVENTIL HFA/VENTOLIN HFA) 108 (90 Base) MCG/ACT inhaler Inhale 2 puffs into the lungs every 4 hours as needed for shortness of breath 18 g 11    alendronate (FOSAMAX) 70 MG tablet Take 1 tablet (70 mg) by mouth every 7 days 12 tablet 3    amLODIPine (NORVASC) 10 MG tablet TAKE 1 TABLET DAILY 90 tablet 3    amoxicillin (AMOXIL) 500 MG capsule Take 4 capsules (2,000 mg) by mouth once as needed (take 30-60 minutes prior to any dental work/cleaning) 4 capsule 1    budesonide (PULMICORT) 0.5 MG/2ML neb solution Add one ampule into [employer]Med sinus rinse bottle with saline mixture and irrigate nose daily. 60 mL 10    budesonide-formoterol (SYMBICORT) 80-4.5 MCG/ACT Inhaler USE 2 INHALATIONS TWICE A DAY 30.6 g 3    cetirizine (ZYRTEC) 10 MG tablet Take 1 tablet by mouth daily as needed for allergies.      ipratropium - albuterol 0.5 mg/2.5 mg/3 mL (DUONEB) 0.5-2.5 (3) MG/3ML neb solution Take 1 vial (3 mLs) by nebulization every 6 hours as needed for shortness of breath or wheezing 90 mL 4    MULTI-VITAMIN OR Take 1 tablet by mouth daily      Omega-3 Fatty Acids (FISH OIL) 500 MG CAPS Take by mouth daily 1 daily      tolterodine ER (DETROL LA) 4 MG 24 hr capsule TAKE 1 CAPSULE DAILY 90 capsule 3    triamcinolone (NASACORT ALLERGY 24HR) 55 MCG/ACT nasal aerosol Spray 1 spray into both nostrils daily         Medications from List 1 of the standing order (on medications that exclude the use of Paxlovid) that  patient is taking: NONE. Is patient taking [place]'s Wort? No  Is patient taking Karyna's Wort or any meds from List 1? No.   Medications from List 2 of the standing order (on meds that provider needs to adjust) that patient is taking: amlodipine (Norvasc), explained a provider visit is necessary to discuss medication adjustments while taking Paxlovid. Is patient on any of the meds from List 2? Yes. Patient was scheduled with virtual urgent care agent to get paxlovid prescribed.    RN did education on emergency symptoms, when to seek more urgent care and encouraged to call RN triage back with changes.       Marivel Salcido RN

## 2024-02-06 NOTE — PROGRESS NOTES
"Ele is a 68 year old who is being evaluated via a billable telephone visit.      What phone number would you like to be contacted at?   How would you like to obtain your AVS? Katyhart    Distant Location (provider location):  Off-site    Assessment & Plan     Infection due to 2019 novel coronavirus  Patient did not want paxlovid. Wanted to try molnupiravir    - molnupiravir (LAGEVRIO) 200 MG capsule; Take 4 capsules (800 mg) by mouth every 12 hours for 5 days      COVID-19 positive patient.  Encounter for consideration of medication intervention. Patient does qualify for a prescription. Full discussion with patient including medication options, risks and benefits. Potential drug interactions reviewed with patient.     Treatment Planned Molnupiravir (18+) Patient is aware that effectiveness is less for this medication. Rx sent to [place] [facility]  [facility] [phone]  [address]  [place] [address]    Hours:  Mon-Fri: 7:00a - 7:00p    Drive-thru services available.  Temporary change to home medications:  None     Estimated body mass index is 21.32 kg/m  as calculated from the following:    Height as of 6/26/23: 1.682 m (5' 6.22\").    Weight as of 6/26/23: 60.3 kg (133 lb).  GFR Estimate   Date Value Ref Range Status   06/26/2023 83 >60 mL/min/1.73m2 Final   11/30/2018 64 >60 mL/min/1.7m2 Final     Comment:     Non  GFR Calc     No results found for: \"[id number]\"    Return in about 1 week (around 2/13/2024), or if symptoms worsen or fail to improve.    Subjective   Ele is a 68 year old, presenting for the following health issues:  No chief complaint on file.    HPI       COVID-19 Symptom Review  How many days ago did these symptoms start? 2 days ago. Tested positive today    Are any of the following symptoms significant for you?  New or worsening difficulty breathing? No  Worsening cough? No  Fever or chills? No  Headache: No  Sore throat: YES  Chest pain: " No  Diarrhea: No  Body aches? YES    What treatments has patient tried? none   Does patient live in a nursing home, group home, or shelter? No  Does patient have a way to get food/medications during quarantined? Yes, I have a friend or family member who can help me.                Review of Systems  Constitutional, HEENT, cardiovascular, pulmonary, gi and gu systems are negative, except as otherwise noted.      Objective           Vitals:  No vitals were obtained today due to virtual visit.    Physical Exam   General: Alert and no distress //Respiratory: No audible wheeze, cough, or shortness of breath // Psychiatric:  Appropriate affect, tone, and pace of words            Phone call duration: 7 minutes  Signed Electronically by: Virtual Urgent Care

## 2024-02-06 NOTE — PATIENT INSTRUCTIONS
For informational purposes only. Not to replace the advice of your health care provider. Copyright   2022 [facility]. All rights reserved. Clinically reviewed by Elizabeth Cuellar, PharmD, BCACP. LiveTop [id number] - REV 06/23.  COVID-19 Outpatient Treatments  Your care team can help you find the best treatments for COVID-19. Talk to a health care provider or refer to the FDA medicine fact sheets below.  Paxlovid (nirmatrelvir and ritonavir): https://www.paxlovid.Battlepro/resources  Molnupiravir (Lagevrio): https://www.fda.gov/media/068968/download  Important: We can only prescribe Paxlovid or Molnupiravir when it can be started within 5 days of first having symptoms.  Paxlovid (nimatrelvir and ritonavir)  How it works  Two medicines (nirmatrelvir and ritonavir) are taken together. They stop the virus from growing. Less amount of virus is easier for your body to fight.  Benefits  Lowers risk of a hospital stay or death from COVID-19.  How to take  Medicine comes in a daily container with both medicine tablets. Take by mouth twice daily (once in the morning, once at night) for 5 days.  The number of tablets to take varies by patient.  Don't chew or break capsules. Swallow whole.  When to take  Take it as soon as possible and within 5 days of your first symptoms.  Who can take it  Patients must be 12 years or older weigh at least 88 pounds. Paxlovid is the preferred treatment for pregnant patients.  Possible side effects  Can cause altered sense of taste, diarrhea (loose, watery stools), high blood pressure, muscle aches.  Medicine conflicts  Some medicines may conflict with Paxlovid and may cause serious side effects.  Tell your care team about all the medicines you take, including prescription and over-the-counter medicines, vitamins, and herbal supplements.  Your care team will review your medicines to make sure that you can safely take Paxlovid.  Cautions  Paxlovid is not advised for patients with severe  kidney or liver disease. If you have kidney or liver problems, the dose may need to be changed.  If you're pregnant or breastfeeding, talk to your care team about your options.  If you take hormonal birth control (such as the Pill), then you or your partner should also use a non-hormonal form of birth control (such as a condom). Keep doing this for 1 menstrual cycle after your last dose of Paxlovid.  Molnupiravir (lagevrio)  How it works  Stops the virus from growing. Less amount of virus is easier for your body to fight.  Benefits  Lowers risk of a hospital stay or death from COVID-19.  How to take  Take 4 capsules by mouth every 12 hours (4 in the morning and 4 at night) for 5 days. Don't chew or break capsules. Swallow whole.  When to take  Take as soon as possible and within 5 days of your first symptoms.  Who can take it  Patients must be 18 years or older.   Possible side effects  Diarrhea (loose, watery stools), nausea (feeling sick to your stomach), dizziness, headaches.  Medicine conflicts  Right now, there are no known conflicts with other drugs. But tell your care team about all medicines you take.  Cautions  This medicine is not advised for patients who are pregnant.  If you are someone who could become pregnant, use trusted birth control until 4 days after your last dose of molnupiravir.  If your partner could become pregnant, you should use trusted birth control until 3 months after your last dose of molnupiravir.      Coping with Life After COVID-19  Being in the hospital because of COVID-19 is scary. Going home can be scary, too. You may face changes to your life, the way you work or what you can eat. It s hard to adjust to change, and it s normal to feel afraid, frustrated or even angry. These feelings usually go away over time. If your feelings don t start to get better, it s called  adjustment disorder.      What signs should I look out for?  Adjustment disorder can happen to anyone in a time of  stress. It makes it hard to cope with daily life. You may feel lonely or fight with loved ones, even if you re glad to be home. Watch for these signs:  Fear or worry  Hard time focusing  Sadness or anger  Trouble talking to family or friends  Feeling like you don t fit in or isolating yourself  Problems with sleep   Drinking alcohol or taking drugs to cope    What can I do?  You can help yourself get better. Feeling you have control helps you move forward. You may wonder if you ll be able to do things you did before. Be patient. Do your best to make the most of every day. Try to build relationships, be as active as you can, eat right and keep a sense of humor. Avoid smoking and drinking too much alcohol. Call your family doctor or clinic if you re not sure what to do. They can guide you to care or other services.    When should I get help?  Think about getting counseling if your sadness or frustration gets worse. Together with a trained counselor, you can talk about your problems adjusting to life after your hospital stay. You can come up with new ways to handle changes that give you more control. Your family doctor or care team can help you find a counselor.     Get help if you re thinking about hurting yourself. If you need help right away, call 911 or go to the nearest emergency room. You can also try the Crisis Text Line.    Crisis Text Line: 863-780 (http://www.crisistextline.org)  The Crisis Text Line serves anyone, in any crisis. It gives free, 24/7 support. Here's how it works:  Text HOME to 403412 from anywhere in the [place], anytime, about any type of crisis.  A live, trained Crisis Counselor will text you back quickly.  The volunteer Crisis Counselor can help you move from a  hot moment  to a  cool moment.  They can also help you work out a safety plan.

## 2024-04-06 ENCOUNTER — HEALTH MAINTENANCE LETTER (OUTPATIENT)
Age: 69
End: 2024-04-06

## 2024-04-29 DIAGNOSIS — J32.4 CHRONIC PANSINUSITIS: ICD-10-CM

## 2024-04-29 RX ORDER — BUDESONIDE 0.5 MG/2ML
INHALANT ORAL
Qty: 60 ML | Refills: 0 | Status: SHIPPED | OUTPATIENT
Start: 2024-04-29 | End: 2024-04-30

## 2024-04-30 ENCOUNTER — TELEPHONE (OUTPATIENT)
Dept: FAMILY MEDICINE | Facility: CLINIC | Age: 69
End: 2024-04-30
Payer: COMMERCIAL

## 2024-04-30 DIAGNOSIS — J32.4 CHRONIC PANSINUSITIS: ICD-10-CM

## 2024-04-30 RX ORDER — BUDESONIDE 0.5 MG/2ML
INHALANT ORAL
Qty: 180 ML | Refills: 0 | Status: SHIPPED | OUTPATIENT
Start: 2024-04-30 | End: 2024-07-15

## 2024-04-30 NOTE — TELEPHONE ENCOUNTER
Patient is requesting a 3 month supply 180ml vs 60ml that was authorized for next month.  Thank you  Jessica Hackett, [facility]  [address] [place] [address]  [place] [id number] [phone]

## 2024-05-28 ENCOUNTER — TELEPHONE (OUTPATIENT)
Dept: FAMILY MEDICINE | Facility: CLINIC | Age: 69
End: 2024-05-28
Payer: COMMERCIAL

## 2024-05-28 DIAGNOSIS — M81.0 AGE RELATED OSTEOPOROSIS, UNSPECIFIED PATHOLOGICAL FRACTURE PRESENCE: ICD-10-CM

## 2024-05-28 NOTE — TELEPHONE ENCOUNTER
Patient Quality Outreach    Patient is due for the following:   Asthma  -  ACT needed  Breast Cancer Screening - Mammogram  Physical Annual Wellness Visit    Next Steps:   Patient has upcoming appointment, these items will be addressed at that time. 7/15/24 Wellness Visit    Type of outreach:    Chart review performed, no outreach needed.    Next Steps:  Reach out within 90 days via Dwollat.    Max number of attempts reached: Yes. Will try again in 90 days if patient still on fail list.    Questions for provider review:    None           Erendira Graves CMA  Chart routed to Care Team.

## 2024-05-29 RX ORDER — ALENDRONATE SODIUM 70 MG/1
70 TABLET ORAL
Qty: 12 TABLET | Refills: 0 | Status: SHIPPED | OUTPATIENT
Start: 2024-05-29 | End: 2024-07-15

## 2024-07-03 DIAGNOSIS — N32.81 OVERACTIVE BLADDER: ICD-10-CM

## 2024-07-03 DIAGNOSIS — I10 HYPERTENSION GOAL BP (BLOOD PRESSURE) < 140/90: ICD-10-CM

## 2024-07-05 RX ORDER — AMLODIPINE BESYLATE 10 MG/1
TABLET ORAL
Qty: 90 TABLET | Refills: 0 | Status: SHIPPED | OUTPATIENT
Start: 2024-07-05 | End: 2024-07-15

## 2024-07-05 RX ORDER — TOLTERODINE 4 MG/1
CAPSULE, EXTENDED RELEASE ORAL
Qty: 90 CAPSULE | Refills: 0 | Status: SHIPPED | OUTPATIENT
Start: 2024-07-05 | End: 2024-07-15

## 2024-07-11 SDOH — HEALTH STABILITY: PHYSICAL HEALTH: ON AVERAGE, HOW MANY DAYS PER WEEK DO YOU ENGAGE IN MODERATE TO STRENUOUS EXERCISE (LIKE A BRISK WALK)?: 4 DAYS

## 2024-07-11 SDOH — HEALTH STABILITY: PHYSICAL HEALTH: ON AVERAGE, HOW MANY MINUTES DO YOU ENGAGE IN EXERCISE AT THIS LEVEL?: 80 MIN

## 2024-07-11 ASSESSMENT — ASTHMA QUESTIONNAIRES
QUESTION_2 LAST FOUR WEEKS HOW OFTEN HAVE YOU HAD SHORTNESS OF BREATH: NOT AT ALL
ACT_TOTALSCORE: 25
QUESTION_4 LAST FOUR WEEKS HOW OFTEN HAVE YOU USED YOUR RESCUE INHALER OR NEBULIZER MEDICATION (SUCH AS ALBUTEROL): NOT AT ALL
ACT_TOTALSCORE: 25
QUESTION_1 LAST FOUR WEEKS HOW MUCH OF THE TIME DID YOUR ASTHMA KEEP YOU FROM GETTING AS MUCH DONE AT WORK, SCHOOL OR AT HOME: NONE OF THE TIME
QUESTION_5 LAST FOUR WEEKS HOW WOULD YOU RATE YOUR ASTHMA CONTROL: COMPLETELY CONTROLLED
QUESTION_3 LAST FOUR WEEKS HOW OFTEN DID YOUR ASTHMA SYMPTOMS (WHEEZING, COUGHING, SHORTNESS OF BREATH, CHEST TIGHTNESS OR PAIN) WAKE YOU UP AT NIGHT OR EARLIER THAN USUAL IN THE MORNING: NOT AT ALL

## 2024-07-11 ASSESSMENT — SOCIAL DETERMINANTS OF HEALTH (SDOH): HOW OFTEN DO YOU GET TOGETHER WITH FRIENDS OR RELATIVES?: THREE TIMES A WEEK

## 2024-07-12 ENCOUNTER — PATIENT OUTREACH (OUTPATIENT)
Dept: CARE COORDINATION | Facility: CLINIC | Age: 69
End: 2024-07-12
Payer: COMMERCIAL

## 2024-07-15 ENCOUNTER — ANCILLARY PROCEDURE (OUTPATIENT)
Dept: MAMMOGRAPHY | Facility: CLINIC | Age: 69
End: 2024-07-15
Attending: FAMILY MEDICINE
Payer: COMMERCIAL

## 2024-07-15 ENCOUNTER — OFFICE VISIT (OUTPATIENT)
Dept: FAMILY MEDICINE | Facility: CLINIC | Age: 69
End: 2024-07-15
Attending: FAMILY MEDICINE
Payer: COMMERCIAL

## 2024-07-15 VITALS
RESPIRATION RATE: 16 BRPM | OXYGEN SATURATION: 94 % | WEIGHT: 135.6 LBS | DIASTOLIC BLOOD PRESSURE: 73 MMHG | SYSTOLIC BLOOD PRESSURE: 118 MMHG | TEMPERATURE: 98.1 F | HEART RATE: 80 BPM | HEIGHT: 66 IN | BODY MASS INDEX: 21.79 KG/M2

## 2024-07-15 DIAGNOSIS — M81.0 AGE RELATED OSTEOPOROSIS, UNSPECIFIED PATHOLOGICAL FRACTURE PRESENCE: ICD-10-CM

## 2024-07-15 DIAGNOSIS — Z00.00 ENCOUNTER FOR MEDICARE ANNUAL WELLNESS EXAM: Primary | ICD-10-CM

## 2024-07-15 DIAGNOSIS — Z96.641 HISTORY OF RIGHT HIP REPLACEMENT: ICD-10-CM

## 2024-07-15 DIAGNOSIS — Z12.31 VISIT FOR SCREENING MAMMOGRAM: ICD-10-CM

## 2024-07-15 DIAGNOSIS — N32.81 OVERACTIVE BLADDER: ICD-10-CM

## 2024-07-15 DIAGNOSIS — J33.9 NASAL POLYPOSIS: ICD-10-CM

## 2024-07-15 DIAGNOSIS — J45.30 MILD PERSISTENT ASTHMA WITHOUT COMPLICATION: ICD-10-CM

## 2024-07-15 DIAGNOSIS — I10 HYPERTENSION GOAL BP (BLOOD PRESSURE) < 140/90: ICD-10-CM

## 2024-07-15 LAB
ANION GAP SERPL CALCULATED.3IONS-SCNC: 7 MMOL/L (ref 7–15)
BUN SERPL-MCNC: 14.9 MG/DL (ref 8–23)
CALCIUM SERPL-MCNC: 8.8 MG/DL (ref 8.8–10.2)
CHLORIDE SERPL-SCNC: 104 MMOL/L (ref 98–107)
CREAT SERPL-MCNC: 0.95 MG/DL (ref 0.51–0.95)
EGFRCR SERPLBLD CKD-EPI 2021: 65 ML/MIN/1.73M2
GLUCOSE SERPL-MCNC: 96 MG/DL (ref 70–99)
HCO3 SERPL-SCNC: 30 MMOL/L (ref 22–29)
POTASSIUM SERPL-SCNC: 4 MMOL/L (ref 3.4–5.3)
SODIUM SERPL-SCNC: 141 MMOL/L (ref 135–145)

## 2024-07-15 PROCEDURE — 77067 SCR MAMMO BI INCL CAD: CPT | Mod: TC | Performed by: RADIOLOGY

## 2024-07-15 PROCEDURE — 99214 OFFICE O/P EST MOD 30 MIN: CPT | Mod: 25 | Performed by: FAMILY MEDICINE

## 2024-07-15 PROCEDURE — 36415 COLL VENOUS BLD VENIPUNCTURE: CPT | Performed by: FAMILY MEDICINE

## 2024-07-15 PROCEDURE — G0439 PPPS, SUBSEQ VISIT: HCPCS | Performed by: FAMILY MEDICINE

## 2024-07-15 PROCEDURE — 80048 BASIC METABOLIC PNL TOTAL CA: CPT | Performed by: FAMILY MEDICINE

## 2024-07-15 PROCEDURE — 77063 BREAST TOMOSYNTHESIS BI: CPT | Mod: TC | Performed by: RADIOLOGY

## 2024-07-15 RX ORDER — IPRATROPIUM BROMIDE AND ALBUTEROL SULFATE 2.5; .5 MG/3ML; MG/3ML
1 SOLUTION RESPIRATORY (INHALATION) EVERY 6 HOURS PRN
Qty: 90 ML | Refills: 4 | Status: SHIPPED | OUTPATIENT
Start: 2024-07-15

## 2024-07-15 RX ORDER — ALENDRONATE SODIUM 70 MG/1
70 TABLET ORAL
Qty: 12 TABLET | Refills: 3 | Status: SHIPPED | OUTPATIENT
Start: 2024-07-15

## 2024-07-15 RX ORDER — AMOXICILLIN 500 MG/1
2000 CAPSULE ORAL
Qty: 4 CAPSULE | Refills: 1 | Status: SHIPPED | OUTPATIENT
Start: 2024-07-15

## 2024-07-15 RX ORDER — BUDESONIDE 0.5 MG/2ML
INHALANT ORAL
Qty: 180 ML | Refills: 3 | Status: SHIPPED | OUTPATIENT
Start: 2024-07-15

## 2024-07-15 RX ORDER — ALBUTEROL SULFATE 90 UG/1
2 AEROSOL, METERED RESPIRATORY (INHALATION) EVERY 4 HOURS PRN
Qty: 18 G | Refills: 11 | Status: SHIPPED | OUTPATIENT
Start: 2024-07-15

## 2024-07-15 RX ORDER — TOLTERODINE 4 MG/1
CAPSULE, EXTENDED RELEASE ORAL
Qty: 90 CAPSULE | Refills: 3 | Status: SHIPPED | OUTPATIENT
Start: 2024-07-15

## 2024-07-15 RX ORDER — AMLODIPINE BESYLATE 10 MG/1
TABLET ORAL
Qty: 90 TABLET | Refills: 3 | Status: SHIPPED | OUTPATIENT
Start: 2024-07-15

## 2024-07-15 RX ORDER — BUDESONIDE AND FORMOTEROL FUMARATE DIHYDRATE 80; 4.5 UG/1; UG/1
AEROSOL RESPIRATORY (INHALATION)
Qty: 30.6 G | Refills: 3 | Status: SHIPPED | OUTPATIENT
Start: 2024-07-15

## 2024-07-15 NOTE — PROGRESS NOTES
Preventive Care Visit  [facility] SAMIRA Lenz MD, Family Medicine  Jul 15, 2024      Assessment & Plan     Encounter for Medicare annual wellness exam  - PRIMARY CARE FOLLOW-UP SCHEDULING  Follow-up with me yearly     Age related osteoporosis, unspecified pathological fracture presence  - alendronate (FOSAMAX) 70 MG tablet; Take 1 tablet (70 mg) by mouth every 7 days    Hypertension goal BP (blood pressure) < 140/90  Well controlled with medications without side effects.   - BASIC METABOLIC PANEL; Future  - amLODIPine (NORVASC) 10 MG tablet; TAKE 1 TABLET DAILY    Mild persistent asthma without complication  Well controlled with medications without side effects.   - albuterol (PROAIR HFA/PROVENTIL HFA/VENTOLIN HFA) 108 (90 Base) MCG/ACT inhaler; Inhale 2 puffs into the lungs every 4 hours as needed for shortness of breath  - budesonide-formoterol (SYMBICORT) 80-4.5 MCG/ACT Inhaler; USE 2 INHALATIONS TWICE A DAY  - ipratropium - albuterol 0.5 mg/2.5 mg/3 mL (DUONEB) 0.5-2.5 (3) MG/3ML neb solution; Take 1 vial (3 mLs) by nebulization every 6 hours as needed for shortness of breath or wheezing  - budesonide (PULMICORT) 0.5 MG/2ML neb solution; Add one ampule into [employer] sinus rinse bottle with saline mixture and irrigate nose daily.    Nasal polyposis  - budesonide (PULMICORT) 0.5 MG/2ML neb solution; Add one ampule into [employer]Med sinus rinse bottle with saline mixture and irrigate nose daily.    Overactive bladder  - tolterodine ER (DETROL LA) 4 MG 24 hr capsule; TAKE 1 CAPSULE DAILY    History of right hip replacement  - amoxicillin (AMOXIL) 500 MG capsule; Take 4 capsules (2,000 mg) by mouth once as needed (take 30-60 minutes prior to any dental work/cleaning)            Counseling  Appropriate preventive services were discussed with this patient, including applicable screening as appropriate for fall prevention, nutrition, physical activity, Tobacco-use cessation, weight loss and  cognition.  Checklist reviewing preventive services available has been given to the patient.  Reviewed patient's diet, addressing concerns and/or questions.   She is at risk for psychosocial distress and has been provided with information to reduce risk.   The patient was provided with written information regarding signs of hearing loss.   Information on urinary incontinence and treatment options given to patient.           Phu Marlow is a 69 year old, presenting for the following:  Physical        7/15/2024     9:13 AM   Additional Questions   Roomed by Alicia GARIBAY CMA   Accompanied by Self         Health Care Directive  Patient does not have a Health Care Directive or Living Will: Discussed advance care planning with patient; information given to patient to review.    HPI  Patient has osteoporosis by prior DEXA scan in unknown control with medications. Occasional dyspepsia but not bothersome.     Hypertension well controlled on current medications without side effects, chest pain, or dyspnea.     Patient also has asthma. Occasional wheezing and shortness of breath are triggered by nasal polyposis and relieved by albuterol.     Urinary frequency controlled with medications without side effects.             7/11/2024   General Health   How would you rate your overall physical health? Good   Feel stress (tense, anxious, or unable to sleep) Only a little      (!) STRESS CONCERN      7/11/2024   Nutrition   Diet: Regular (no restrictions)            7/11/2024   Exercise   Days per week of moderate/strenous exercise 4 days   Average minutes spent exercising at this level 80 min            7/11/2024   Social Factors   Frequency of gathering with friends or relatives Three times a week   Worry food won't last until get money to buy more Patient declined   Food not last or not have enough money for food? Patient declined   Do you have housing? (Housing is defined as stable permanent housing and does not include staying  ouside in a car, in a tent, in an abandoned building, in an overnight shelter, or couch-surfing.) Patient declined   Are you worried about losing your housing? Patient declined   Lack of transportation? Patient declined   Unable to get utilities (heat,electricity)? Patient declined            7/11/2024   Fall Risk   Fallen 2 or more times in the past year? No    No   Trouble with walking or balance? No    No       Multiple values from one day are sorted in reverse-chronological order          7/11/2024   Activities of Daily Living- Home Safety   Needs help with the following daily activites None of the above   Safety concerns in the home None of the above            7/11/2024   Dental   Dentist two times every year? Yes            7/11/2024   Hearing Screening   Hearing concerns? (!) IT'S HARD TO FOLLOW A CONVERSATION IN A NOISY RESTAURANT OR CROWDED ROOM.            7/11/2024   Driving Risk Screening   Patient/family members have concerns about driving No            7/11/2024   General Alertness/Fatigue Screening   Have you been more tired than usual lately? No            7/11/2024   Urinary Incontinence Screening   Bothered by leaking urine in past 6 months Yes            7/11/2024   TB Screening   Were you born outside of the [place]? Yes            Today's PHQ-2 Score:       7/15/2024     8:57 AM   PHQ-2 ( 1999 [employer])   Q1: Little interest or pleasure in doing things 0   Q2: Feeling down, depressed or hopeless 0   PHQ-2 Score 0   Q1: Little interest or pleasure in doing things Not at all   Q2: Feeling down, depressed or hopeless Not at all   PHQ-2 Score 0           7/11/2024   Substance Use   Alcohol more than 3/day or more than 7/wk No   Do you have a current opioid prescription? No   How severe/bad is pain from 1 to 10? 0/10 (No Pain)   Do you use any other substances recreationally? No        Social History     Tobacco Use    Smoking status: Never    Smokeless tobacco: Never   Vaping Use    Vaping status: Never  Used   Substance Use Topics    Alcohol use: Yes     Alcohol/week: 1.7 standard drinks of alcohol     Comment: 1-2 a month    Drug use: No           1/14/2022   LAST FHS-7 RESULTS   1st degree relative breast or ovarian cancer No   Any relative bilateral breast cancer No   Any male have breast cancer No   Any ONE woman have BOTH breast AND ovarian cancer No   Any woman with breast cancer before 50yrs No   2 or more relatives with breast AND/OR ovarian cancer No   2 or more relatives with breast AND/OR bowel cancer No           Mammogram Screening - Mammogram every 1-2 years updated in Health Maintenance based on mutual decision making      History of abnormal Pap smear: YES - other categories - see link Cervical Cytology Screening Guidelines        Latest Ref Rng & Units 3/21/2022     9:54 AM 12/10/2019     5:57 PM 8/6/2018     3:38 PM   PAP / HPV   PAP  Negative for Intraepithelial Lesion or Malignancy (NILM)      PAP (Historical)   NIL     HPV 16 DNA Negative Negative   Negative    HPV 18 DNA Negative Negative   Negative    Other HR HPV Negative Negative   Negative      ASCVD Risk   The 10-year ASCVD risk score (Lorna RODRÍGUEZ, et al., 2019) is: 8%    Values used to calculate the score:      Age: 69 years      Sex: Female      Is Non- : No      Diabetic: No      Tobacco smoker: No      Systolic Blood Pressure: 118 mmHg      Is BP treated: Yes      HDL Cholesterol: 87 mg/dL      Total Cholesterol: 219 mg/dL            Reviewed and updated as needed this visit by Provider   Tobacco  Allergies  Meds  Problems  Med Hx  Surg Hx  Fam Hx     Sexual Activity          Patient Active Problem List   Diagnosis    Mild persistent asthma    Overactive bladder    History of cervical cancer    Allergic rhinitis    Hypertension goal BP (blood pressure) < 140/90    Nasal polyposis    Osteoporosis    Posterior vitreous detachment of right eye    Suspected glaucoma of both eyes    History of right  hip replacement     Past Surgical History:   Procedure Laterality Date    COLONOSCOPY WITH CO2 INSUFFLATION N/A 04/12/2017    Procedure: COLONOSCOPY WITH CO2 INSUFFLATION;  Surgeon: Anatoly Do MD;  Location: [facility] OR    COLPOSCOPY,LOOP ELECTRD CERVIX EXCIS  2000    Radiation & chemotherapy for cervical cancer. Diagnosed in 2000    CYSTOSCOPY N/A 11/30/2018    Procedure: CYSTOSCOPY;  Surgeon: Genesis Hicks MD;  Location: [facility] OR    HC CORRECT BUNION,SIMPLE      ESTEPHANIE    JOINT REPLACEMENT, HIP RT/LT Right     LAPAROSCOPIC SALPINGO-OOPHORECTOMY Bilateral 11/30/2018    Procedure: Laparoscopic Bilateral Salpingo-Oophorectomy, Pelvic washings, cystoscopy;  Surgeon: Genesis Hicks MD;  Location: [facility] OR    SINUS SURGERY      x 2, Nasal polyps removed       Social History     Tobacco Use    Smoking status: Never    Smokeless tobacco: Never   Substance Use Topics    Alcohol use: Yes     Alcohol/week: 1.7 standard drinks of alcohol     Comment: 1-2 a month     Family History   Problem Relation Age of Onset    Glaucoma Mother     Hypertension Father     Asthma Father     Pacemaker Father     Cancer Maternal Grandmother         pelvic    Ovarian Cancer Maternal Grandmother     Diabetes Maternal Grandfather     Coronary Artery Disease Paternal Grandmother     Allergies Brother     Neurologic Disorder Sister         brain aneurysm    Cerebrovascular Disease Sister     C.A.D. No family hx of          Current providers sharing in care for this patient include:  Patient Care Team:  Rosanna Lenz MD as PCP - General (Family Practice)  Rosanna Lenz MD as Assigned PCP  Dutch Ott MD as MD (Otolaryngology)  Kerwin Barroso MD as Assigned Pulmonology Provider  Renetta Travis MD as Assigned Surgical Provider    The following health maintenance items are reviewed in Epic and correct as of today:  Health Maintenance   Topic Date Due    COVID-19 Vaccine (5 - 2023-24 season)  "09/01/2023    MAMMO SCREENING  01/14/2024    BMP  06/26/2024    INFLUENZA VACCINE (1) 09/01/2024    ASTHMA CONTROL TEST  01/15/2025    PAP FOLLOW-UP  03/21/2025    HPV FOLLOW-UP  03/21/2025    MEDICARE ANNUAL WELLNESS VISIT  07/15/2025    ANNUAL REVIEW OF HM ORDERS  07/15/2025    ASTHMA ACTION PLAN  07/15/2025    FALL RISK ASSESSMENT  07/15/2025    COLORECTAL CANCER SCREENING  03/01/2026    GLUCOSE  06/26/2026    DEXA  01/14/2027    LIPID  03/21/2027    ADVANCE CARE PLANNING  07/15/2029    DTAP/TDAP/TD IMMUNIZATION (3 - Td or Tdap) 06/23/2032    HEPATITIS C SCREENING  Completed    PHQ-2 (once per calendar year)  Completed    Pneumococcal Vaccine: 65+ Years  Completed    ZOSTER IMMUNIZATION  Completed    RSV VACCINE (Pregnancy & 60+)  Completed    IPV IMMUNIZATION  Aged Out    HPV IMMUNIZATION  Aged Out    MENINGITIS IMMUNIZATION  Aged Out    RSV MONOCLONAL ANTIBODY  Aged Out    PAP  Discontinued            Objective    Exam  /73 (BP Location: Right arm, Patient Position: Sitting, Cuff Size: Adult Regular)   Pulse 80   Temp 98.1  F (36.7  C) (Oral)   Resp 16   Ht 1.683 m (5' 6.26\")   Wt 61.5 kg (135 lb 9.6 oz)   LMP  (LMP Unknown)   SpO2 94%   BMI 21.71 kg/m     Estimated body mass index is 21.71 kg/m  as calculated from the following:    Height as of this encounter: 1.683 m (5' 6.26\").    Weight as of this encounter: 61.5 kg (135 lb 9.6 oz).    Physical Exam  GENERAL: alert and no distress  EYES: Eyes grossly normal to inspection, PERRL and conjunctivae and sclerae normal  HENT: ear canals and TM's normal, nose and mouth without ulcers or lesions  NECK: no adenopathy, no asymmetry, masses, or scars  RESP: lungs clear to auscultation - no rales, rhonchi or wheezes  CV: regular rate and rhythm, normal S1 S2, no S3 or S4, no murmur, click or rub, no peripheral edema  MS: no gross musculoskeletal defects noted, no edema  NEURO: Normal strength and tone, mentation intact and speech normal  PSYCH: mentation " appears normal, affect normal/bright        7/15/2024   Mini Cog   Clock Draw Score 2 Normal   3 Item Recall 3 objects recalled   Mini Cog Total Score 5                 Signed Electronically by: Rosanna Lenz MD

## 2024-07-15 NOTE — LETTER
My Asthma Action Plan    Name: Wendy Francis   YOB: 1955  Date: 7/15/2024   My doctor: Rosanna Lenz MD   My clinic: [facility]        My Control Medicine: Budesonide + formoterol (Symbicort HFA) -  80/4.5 mcg 2 puffs twice daily   My Rescue Medicine: Albuterol (Proair/Ventolin/Proventil HFA) 2-4 puffs EVERY 4 HOURS as needed. Use a spacer if recommended by your provider.   My Asthma Severity:   Mild Persistent  Know your asthma triggers: upper respiratory infections               GREEN ZONE   Good Control  I feel good  No cough or wheeze  Can work, sleep and play without asthma symptoms       Take your asthma control medicine every day.     If exercise triggers your asthma, take your rescue medication  15 minutes before exercise or sports, and  During exercise if you have asthma symptoms  Spacer to use with inhaler: If you have a spacer, make sure to use it with your inhaler             YELLOW ZONE Getting Worse  I have ANY of these:  I do not feel good  Cough or wheeze  Chest feels tight  Wake up at night   Keep taking your Green Zone medications  Start taking your rescue medicine:  every 20 minutes for up to 1 hour. Then every 4 hours for 24-48 hours.  If you stay in the Yellow Zone for more than 12-24 hours, contact your doctor.  If you do not return to the Green Zone in 12-24 hours or you get worse, start taking your oral steroid medicine if prescribed by your provider.           RED ZONE Medical Alert - Get Help  I have ANY of these:  I feel awful  Medicine is not helping  Breathing getting harder  Trouble walking or talking  Nose opens wide to breathe       Take your rescue medicine NOW  If your provider has prescribed an oral steroid medicine, start taking it NOW  Call your doctor NOW  If you are still in the Red Zone after 20 minutes and you have not reached your doctor:  Take your rescue medicine again and  Call 911 or go to the emergency room right away    See  your regular doctor within 2 weeks of an Emergency Room or Urgent Care visit for follow-up treatment.          Annual Reminders:  Meet with Asthma Educator,  Flu Shot in the Fall, consider Pneumonia Vaccination for patients with asthma (aged 19 and older).    Pharmacy:    [facility] [place]  [place] - [address] [facility] [address]  [facility] - [address]    Electronically signed by Rosanna Lenz MD   Date: 07/15/24                      Asthma Triggers  How To Control Things That Make Your Asthma Worse    Triggers are things that make your asthma worse.  Look at the list below to help you find your triggers and what you can do about them.  You can help prevent asthma flare-ups by staying away from your triggers.      Trigger                                                          What you can do   Cigarette Smoke  Tobacco smoke can make asthma worse. Do not allow smoking in your home, car or around you.  Be sure no one smokes at a child s day care or school.  If you smoke, ask your health care provider for ways to help you quit.  Ask family members to quit too.  Ask your health care provider for a referral to Quit Plan to help you quit smoking, or call 2-568-991-PLAN.     Colds, Flu, Bronchitis  These are common triggers of asthma. Wash your hands often.  Don t touch your eyes, nose or mouth.  Get a flu shot every year.     Dust Mites  These are tiny bugs that live in cloth or carpet. They are too small to see. Wash sheets and blankets in hot water every week.   Encase pillows and mattress in dust mite proof covers.  Avoid having carpet if you can. If you have carpet, vacuum weekly.   Use a dust mask and HEPA vacuum.   Pollen and Outdoor Mold  Some people are allergic to trees, grass, or weed pollen, or molds. Try to keep your windows closed.  Limit time out doors when pollen count is high.   Ask you health care provider about  taking medicine during allergy season.     Animal Dander  Some people are allergic to skin flakes, urine or saliva from pets with fur or feathers. Keep pets with fur or feathers out of your home.    If you can t keep the pet outdoors, then keep the pet out of your bedroom.  Keep the bedroom door closed.  Keep pets off cloth furniture and away from stuffed toys.     Mice, Rats, and Cockroaches   Some people are allergic to the waste from these pests.   Cover food and garbage.  Clean up spills and food crumbs.  Store grease in the refrigerator.   Keep food out of the bedroom.   Indoor Mold  This can be a trigger if your home has high moisture. Fix leaking faucets, pipes, or other sources of water.   Clean moldy surfaces.  Dehumidify basement if it is damp and smelly.   Smoke, Strong Odors, and Sprays  These can reduce air quality. Stay away from strong odors and sprays, such as perfume, powder, hair spray, paints, smoke incense, paint, cleaning products, candles and new carpet.   Exercise or Sports  Some people with asthma have this trigger. Be active!  Ask your doctor about taking medicine before sports or exercise to prevent symptoms.    Warm up for 5-10 minutes before and after sports or exercise.     Other Triggers of Asthma  Cold air:  Cover your nose and mouth with a scarf.  Sometimes laughing or crying can be a trigger.  Some medicines and food can trigger asthma.

## 2024-07-15 NOTE — PATIENT INSTRUCTIONS
Patient Education   Preventive Care Advice   This is general advice given by our system to help you stay healthy. However, your care team may have specific advice just for you. Please talk to your care team about your preventive care needs.  Nutrition  Eat 5 or more servings of fruits and vegetables each day.  Try wheat bread, brown rice and whole grain pasta (instead of white bread, rice, and pasta).  Get enough calcium and vitamin D. Check the label on foods and aim for 100% of the RDA (recommended daily allowance).  Lifestyle  Exercise at least 150 minutes each week  (30 minutes a day, 5 days a week).  Do muscle strengthening activities 2 days a week. These help control your weight and prevent disease.  No smoking.  Wear sunscreen to prevent skin cancer.  Have a dental exam and cleaning every 6 months.  Yearly exams  See your health care team every year to talk about:  Any changes in your health.  Any medicines your care team has prescribed.  Preventive care, family planning, and ways to prevent chronic diseases.  Shots (vaccines)   HPV shots (up to age 26), if you've never had them before.  Hepatitis B shots (up to age 59), if you've never had them before.  COVID-19 shot: Get this shot when it's due.  Flu shot: Get a flu shot every year.  Tetanus shot: Get a tetanus shot every 10 years.  Pneumococcal, hepatitis A, and RSV shots: Ask your care team if you need these based on your risk.  Shingles shot (for age 50 and up)  General health tests  Diabetes screening:  Starting at age 35, Get screened for diabetes at least every 3 years.  If you are younger than age 35, ask your care team if you should be screened for diabetes.  Cholesterol test: At age 39, start having a cholesterol test every 5 years, or more often if advised.  Bone density scan (DEXA): At age 50, ask your care team if you should have this scan for osteoporosis (brittle bones).  Hepatitis C: Get tested at least once in your life.  STIs (sexually  transmitted infections)  Before age 24: Ask your care team if you should be screened for STIs.  After age 24: Get screened for STIs if you're at risk. You are at risk for STIs (including HIV) if:  You are sexually active with more than one person.  You don't use condoms every time.  You or a partner was diagnosed with a sexually transmitted infection.  If you are at risk for HIV, ask about PrEP medicine to prevent HIV.  Get tested for HIV at least once in your life, whether you are at risk for HIV or not.  Cancer screening tests  Cervical cancer screening: If you have a cervix, begin getting regular cervical cancer screening tests starting at age 21.  Breast cancer scan (mammogram): If you've ever had breasts, begin having regular mammograms starting at age 40. This is a scan to check for breast cancer.  Colon cancer screening: It is important to start screening for colon cancer at age 45.  Have a colonoscopy test every 10 years (or more often if you're at risk) Or, ask your provider about stool tests like a FIT test every year or Cologuard test every 3 years.  To learn more about your testing options, visit:   .  For help making a decision, visit:   https://bit.ly/px45546.  Prostate cancer screening test: If you have a prostate, ask your care team if a prostate cancer screening test (PSA) at age 55 is right for you.  Lung cancer screening: If you are a current or former smoker ages 50 to 80, ask your care team if ongoing lung cancer screenings are right for you.  For informational purposes only. Not to replace the advice of your health care provider. Copyright   2023 [facility] [employer] [facility]. All rights reserved. Clinically reviewed by the [facility]. Kuaidi Dache [id number] - REV 01/24.  Hearing Loss: Care Instructions  Overview     Hearing loss is a sudden or slow decrease in how well you hear. It can range from slight to profound. Permanent hearing loss can occur with aging. It also can  happen when you are exposed long-term to loud noise. Examples include listening to loud music, riding motorcycles, or being around other loud machines.  Hearing loss can affect your work and home life. It can make you feel lonely or depressed. You may feel that you have lost your independence. But hearing aids and other devices can help you hear better and feel connected to others.  Follow-up care is a key part of your treatment and safety. Be sure to make and go to all appointments, and call your doctor if you are having problems. It's also a good idea to know your test results and keep a list of the medicines you take.  How can you care for yourself at home?  Avoid loud noises whenever possible. This helps keep your hearing from getting worse.  Always wear hearing protection around loud noises.  Wear a hearing aid as directed.  A professional can help you pick a hearing aid that will work best for you.  You can also get hearing aids over the counter for mild to moderate hearing loss.  Have hearing tests as your doctor suggests. They can show whether your hearing has changed. Your hearing aid may need to be adjusted.  Use other devices as needed. These may include:  Telephone amplifiers and hearing aids that can connect to a television, stereo, radio, or microphone.  Devices that use lights or vibrations. These alert you to the doorbell, a ringing telephone, or a baby monitor.  Television closed-captioning. This shows the words at the bottom of the screen. Most new TVs can do this.  TTY (text telephone). This lets you type messages back and forth on the telephone instead of talking or listening. These devices are also called TDD. When messages are typed on the keyboard, they are sent over the phone line to a receiving TTY. The message is shown on a monitor.  Use text messaging, social media, and email if it is hard for you to communicate by telephone.  Try to learn a listening technique called speechreading. It is  "not lipreading. You pay attention to people's gestures, expressions, posture, and tone of voice. These clues can help you understand what a person is saying. Face the person you are talking to, and have them face you. Make sure the lighting is good. You need to see the other person's face clearly.  Think about counseling if you need help to adjust to your hearing loss.  When should you call for help?  Watch closely for changes in your health, and be sure to contact your doctor if:    You think your hearing is getting worse.     You have new symptoms, such as dizziness or nausea.   Where can you learn more?  Go to https://www.[employer].net/patiented  Enter R798 in the search box to learn more about \"Hearing Loss: Care Instructions.\"  Current as of: September 27, 2023               Content Version: 14.0    9652-1209 [employer].   Care instructions adapted under license by your healthcare professional. If you have questions about a medical condition or this instruction, always ask your healthcare professional. [employer] disclaims any warranty or liability for your use of this information.      Bladder Training: Care Instructions  Your Care Instructions     Bladder training is used to treat urge incontinence and stress incontinence. Urge incontinence means that the need to urinate comes on so fast that you can't get to a toilet in time. Stress incontinence means that you leak urine because of pressure on your bladder. For example, it may happen when you laugh, cough, or lift something heavy.  Bladder training can increase how long you can wait before you have to urinate. It can also help your bladder hold more urine. And it can give you better control over the urge to urinate.  It is important to remember that bladder training takes a few weeks to a few months to make a difference. You may not see results right away, but don't give up.  Follow-up care is a key part of your treatment and " safety. Be sure to make and go to all appointments, and call your doctor if you are having problems. It's also a good idea to know your test results and keep a list of the medicines you take.  How can you care for yourself at home?  Work with your doctor to come up with a bladder training program that is right for you. You may use one or more of the following methods.  Delayed urination  In the beginning, try to keep from urinating for 5 minutes after you first feel the need to go.  While you wait, take deep, slow breaths to relax. Kegel exercises can also help you delay the need to go to the bathroom.  After some practice, when you can easily wait 5 minutes to urinate, try to wait 10 minutes before you urinate.  Slowly increase the waiting period until you are able to control when you have to urinate.  Scheduled urination  Empty your bladder when you first wake up in the morning.  Schedule times throughout the day when you will urinate.  Start by going to the bathroom every hour, even if you don't need to go.  Slowly increase the time between trips to the bathroom.  When you have found a schedule that works well for you, keep doing it.  If you wake up during the night and have to urinate, do it. Apply your schedule to waking hours only.  Kegel exercises  These tighten and strengthen pelvic muscles, which can help you control the flow of urine. (If doing these exercises causes pain, stop doing them and talk with your doctor.) To do Kegel exercises:  Squeeze your muscles as if you were trying not to pass gas. Or squeeze your muscles as if you were stopping the flow of urine. Your belly, legs, and buttocks shouldn't move.  Hold the squeeze for 3 seconds, then relax for 5 to 10 seconds.  Start with 3 seconds, then add 1 second each week until you are able to squeeze for 10 seconds.  Repeat the exercise 10 times a session. Do 3 to 8 sessions a day.  When should you call for help?  Watch closely for changes in your  "health, and be sure to contact your doctor if:    Your incontinence is getting worse.     You do not get better as expected.   Where can you learn more?  Go to https://[email or web address].[email or web address].net/patiented  Enter V684 in the search box to learn more about \"Bladder Training: Care Instructions.\"  Current as of: November 15, 2023               Content Version: 14.0    8026-7343 [employer].   Care instructions adapted under license by your healthcare professional. If you have questions about a medical condition or this instruction, always ask your healthcare professional. [employer] disclaims any warranty or liability for your use of this information.         "

## 2025-05-09 ENCOUNTER — TRANSFERRED RECORDS (OUTPATIENT)
Dept: HEALTH INFORMATION MANAGEMENT | Facility: CLINIC | Age: 70
End: 2025-05-09

## 2025-05-16 ENCOUNTER — TRANSFERRED RECORDS (OUTPATIENT)
Dept: HEALTH INFORMATION MANAGEMENT | Facility: CLINIC | Age: 70
End: 2025-05-16

## 2025-06-10 DIAGNOSIS — J33.9 NASAL POLYPOSIS: ICD-10-CM

## 2025-06-10 DIAGNOSIS — J45.30 MILD PERSISTENT ASTHMA WITHOUT COMPLICATION: ICD-10-CM

## 2025-06-10 RX ORDER — BUDESONIDE 0.5 MG/2ML
INHALANT ORAL
Qty: 180 ML | Refills: 0 | Status: SHIPPED | OUTPATIENT
Start: 2025-06-10

## 2025-06-10 NOTE — TELEPHONE ENCOUNTER
"[facility] pharmacy calling to request refills on pulmicort nebs.    Patient previously got this at [facility] pharmacy, no refills to transfer.    Patient is scheduled for annual visit in August.    Routed to PCP to send due to \"allergy alert\".    Rae GROVES RN  [facility]    "

## 2025-07-07 DIAGNOSIS — M81.0 AGE RELATED OSTEOPOROSIS, UNSPECIFIED PATHOLOGICAL FRACTURE PRESENCE: ICD-10-CM

## 2025-07-07 DIAGNOSIS — J45.30 MILD PERSISTENT ASTHMA WITHOUT COMPLICATION: ICD-10-CM

## 2025-07-07 RX ORDER — BUDESONIDE AND FORMOTEROL FUMARATE DIHYDRATE 80; 4.5 UG/1; UG/1
AEROSOL RESPIRATORY (INHALATION) 2 TIMES DAILY
Qty: 10.2 G | Refills: 0 | Status: SHIPPED | OUTPATIENT
Start: 2025-07-07

## 2025-07-07 RX ORDER — ALENDRONATE SODIUM 70 MG/1
70 TABLET ORAL
Qty: 4 TABLET | Refills: 0 | Status: SHIPPED | OUTPATIENT
Start: 2025-07-07

## 2025-07-29 DIAGNOSIS — Z96.641 HISTORY OF RIGHT HIP REPLACEMENT: ICD-10-CM

## 2025-07-29 RX ORDER — AMOXICILLIN 500 MG/1
2000 CAPSULE ORAL
Qty: 4 CAPSULE | Refills: 1 | Status: SHIPPED | OUTPATIENT
Start: 2025-07-29

## 2025-07-31 SDOH — HEALTH STABILITY: PHYSICAL HEALTH: ON AVERAGE, HOW MANY DAYS PER WEEK DO YOU ENGAGE IN MODERATE TO STRENUOUS EXERCISE (LIKE A BRISK WALK)?: 4 DAYS

## 2025-07-31 SDOH — HEALTH STABILITY: PHYSICAL HEALTH: ON AVERAGE, HOW MANY MINUTES DO YOU ENGAGE IN EXERCISE AT THIS LEVEL?: 110 MIN

## 2025-07-31 ASSESSMENT — ASTHMA QUESTIONNAIRES
ACT_TOTALSCORE: 25
QUESTION_2 LAST FOUR WEEKS HOW OFTEN HAVE YOU HAD SHORTNESS OF BREATH: NOT AT ALL
QUESTION_1 LAST FOUR WEEKS HOW MUCH OF THE TIME DID YOUR ASTHMA KEEP YOU FROM GETTING AS MUCH DONE AT WORK, SCHOOL OR AT HOME: NONE OF THE TIME
QUESTION_5 LAST FOUR WEEKS HOW WOULD YOU RATE YOUR ASTHMA CONTROL: COMPLETELY CONTROLLED
QUESTION_3 LAST FOUR WEEKS HOW OFTEN DID YOUR ASTHMA SYMPTOMS (WHEEZING, COUGHING, SHORTNESS OF BREATH, CHEST TIGHTNESS OR PAIN) WAKE YOU UP AT NIGHT OR EARLIER THAN USUAL IN THE MORNING: NOT AT ALL
QUESTION_4 LAST FOUR WEEKS HOW OFTEN HAVE YOU USED YOUR RESCUE INHALER OR NEBULIZER MEDICATION (SUCH AS ALBUTEROL): NOT AT ALL

## 2025-07-31 ASSESSMENT — SOCIAL DETERMINANTS OF HEALTH (SDOH): HOW OFTEN DO YOU GET TOGETHER WITH FRIENDS OR RELATIVES?: THREE TIMES A WEEK

## 2025-08-04 ENCOUNTER — OFFICE VISIT (OUTPATIENT)
Dept: FAMILY MEDICINE | Facility: CLINIC | Age: 70
End: 2025-08-04
Attending: FAMILY MEDICINE
Payer: COMMERCIAL

## 2025-08-04 ENCOUNTER — TELEPHONE (OUTPATIENT)
Dept: FAMILY MEDICINE | Facility: CLINIC | Age: 70
End: 2025-08-04

## 2025-08-04 VITALS
SYSTOLIC BLOOD PRESSURE: 125 MMHG | WEIGHT: 137.4 LBS | RESPIRATION RATE: 16 BRPM | HEART RATE: 93 BPM | TEMPERATURE: 98.5 F | OXYGEN SATURATION: 95 % | HEIGHT: 66 IN | BODY MASS INDEX: 22.08 KG/M2 | DIASTOLIC BLOOD PRESSURE: 72 MMHG

## 2025-08-04 DIAGNOSIS — N32.81 OVERACTIVE BLADDER: ICD-10-CM

## 2025-08-04 DIAGNOSIS — J33.9 NASAL POLYPOSIS: ICD-10-CM

## 2025-08-04 DIAGNOSIS — J45.30 MILD PERSISTENT ASTHMA WITHOUT COMPLICATION: ICD-10-CM

## 2025-08-04 DIAGNOSIS — I10 HYPERTENSION GOAL BP (BLOOD PRESSURE) < 140/90: ICD-10-CM

## 2025-08-04 DIAGNOSIS — Z85.41 HISTORY OF CERVICAL CANCER: ICD-10-CM

## 2025-08-04 DIAGNOSIS — M81.0 AGE-RELATED OSTEOPOROSIS WITHOUT CURRENT PATHOLOGICAL FRACTURE: ICD-10-CM

## 2025-08-04 DIAGNOSIS — Z00.00 ENCOUNTER FOR MEDICARE ANNUAL WELLNESS EXAM: Primary | ICD-10-CM

## 2025-08-04 DIAGNOSIS — H91.93 HEARING DIFFICULTY OF BOTH EARS: ICD-10-CM

## 2025-08-04 DIAGNOSIS — Z12.4 CERVICAL CANCER SCREENING: ICD-10-CM

## 2025-08-04 LAB
ALBUMIN SERPL BCG-MCNC: 3.8 G/DL (ref 3.5–5.2)
ALP SERPL-CCNC: 63 U/L (ref 40–150)
ALT SERPL W P-5'-P-CCNC: 16 U/L (ref 0–50)
ANION GAP SERPL CALCULATED.3IONS-SCNC: 8 MMOL/L (ref 7–15)
AST SERPL W P-5'-P-CCNC: 21 U/L (ref 0–45)
BILIRUB SERPL-MCNC: 0.3 MG/DL
BUN SERPL-MCNC: 12 MG/DL (ref 8–23)
CALCIUM SERPL-MCNC: 8.7 MG/DL (ref 8.8–10.4)
CHLORIDE SERPL-SCNC: 103 MMOL/L (ref 98–107)
CREAT SERPL-MCNC: 0.87 MG/DL (ref 0.51–0.95)
EGFRCR SERPLBLD CKD-EPI 2021: 72 ML/MIN/1.73M2
GLUCOSE SERPL-MCNC: 100 MG/DL (ref 70–99)
HCO3 SERPL-SCNC: 28 MMOL/L (ref 22–29)
HPV HR 12 DNA CVX QL NAA+PROBE: NEGATIVE
HPV16 DNA CVX QL NAA+PROBE: NEGATIVE
HPV18 DNA CVX QL NAA+PROBE: NEGATIVE
HUMAN PAPILLOMA VIRUS FINAL DIAGNOSIS: NORMAL
POTASSIUM SERPL-SCNC: 3.7 MMOL/L (ref 3.4–5.3)
PROT SERPL-MCNC: 6.1 G/DL (ref 6.4–8.3)
SODIUM SERPL-SCNC: 139 MMOL/L (ref 135–145)

## 2025-08-04 PROCEDURE — 80053 COMPREHEN METABOLIC PANEL: CPT | Performed by: FAMILY MEDICINE

## 2025-08-04 PROCEDURE — 3078F DIAST BP <80 MM HG: CPT | Performed by: FAMILY MEDICINE

## 2025-08-04 PROCEDURE — G2211 COMPLEX E/M VISIT ADD ON: HCPCS | Performed by: FAMILY MEDICINE

## 2025-08-04 PROCEDURE — G0123 SCREEN CERV/VAG THIN LAYER: HCPCS | Performed by: FAMILY MEDICINE

## 2025-08-04 PROCEDURE — 99214 OFFICE O/P EST MOD 30 MIN: CPT | Mod: 25 | Performed by: FAMILY MEDICINE

## 2025-08-04 PROCEDURE — 87624 HPV HI-RISK TYP POOLED RSLT: CPT | Mod: GY | Performed by: FAMILY MEDICINE

## 2025-08-04 PROCEDURE — 3074F SYST BP LT 130 MM HG: CPT | Performed by: FAMILY MEDICINE

## 2025-08-04 PROCEDURE — 36415 COLL VENOUS BLD VENIPUNCTURE: CPT | Performed by: FAMILY MEDICINE

## 2025-08-04 PROCEDURE — G0439 PPPS, SUBSEQ VISIT: HCPCS | Performed by: FAMILY MEDICINE

## 2025-08-04 RX ORDER — ALENDRONATE SODIUM 70 MG/1
70 TABLET ORAL
Qty: 4 TABLET | Refills: 4 | Status: SHIPPED | OUTPATIENT
Start: 2025-08-04 | End: 2025-08-06

## 2025-08-04 RX ORDER — TOLTERODINE 4 MG/1
CAPSULE, EXTENDED RELEASE ORAL
Qty: 90 CAPSULE | Refills: 4 | Status: SHIPPED | OUTPATIENT
Start: 2025-08-04 | End: 2025-08-06

## 2025-08-04 RX ORDER — IPRATROPIUM BROMIDE AND ALBUTEROL SULFATE 2.5; .5 MG/3ML; MG/3ML
1 SOLUTION RESPIRATORY (INHALATION) EVERY 6 HOURS PRN
Qty: 90 ML | Refills: 4 | Status: SHIPPED | OUTPATIENT
Start: 2025-08-04

## 2025-08-04 RX ORDER — BUDESONIDE AND FORMOTEROL FUMARATE DIHYDRATE 80; 4.5 UG/1; UG/1
2 AEROSOL RESPIRATORY (INHALATION) 2 TIMES DAILY
Qty: 30.6 G | Refills: 4 | Status: SHIPPED | OUTPATIENT
Start: 2025-08-04

## 2025-08-04 RX ORDER — ALBUTEROL SULFATE 90 UG/1
2 INHALANT RESPIRATORY (INHALATION) EVERY 4 HOURS PRN
Qty: 18 G | Refills: 4 | Status: SHIPPED | OUTPATIENT
Start: 2025-08-04

## 2025-08-04 RX ORDER — BUDESONIDE 0.5 MG/2ML
INHALANT ORAL
Qty: 180 ML | Refills: 4 | Status: SHIPPED | OUTPATIENT
Start: 2025-08-04

## 2025-08-04 RX ORDER — AMLODIPINE BESYLATE 10 MG/1
TABLET ORAL
Qty: 90 TABLET | Refills: 4 | Status: SHIPPED | OUTPATIENT
Start: 2025-08-04 | End: 2025-08-06

## 2025-08-05 ENCOUNTER — PATIENT OUTREACH (OUTPATIENT)
Dept: CARE COORDINATION | Facility: CLINIC | Age: 70
End: 2025-08-05
Payer: COMMERCIAL

## 2025-08-06 RX ORDER — TOLTERODINE 4 MG/1
CAPSULE, EXTENDED RELEASE ORAL
Qty: 90 CAPSULE | Refills: 4 | Status: SHIPPED | OUTPATIENT
Start: 2025-08-06

## 2025-08-06 RX ORDER — ALENDRONATE SODIUM 70 MG/1
70 TABLET ORAL
Qty: 4 TABLET | Refills: 4 | Status: SHIPPED | OUTPATIENT
Start: 2025-08-06

## 2025-08-06 RX ORDER — AMLODIPINE BESYLATE 10 MG/1
TABLET ORAL
Qty: 90 TABLET | Refills: 4 | Status: SHIPPED | OUTPATIENT
Start: 2025-08-06

## 2025-08-07 ENCOUNTER — PATIENT OUTREACH (OUTPATIENT)
Dept: FAMILY MEDICINE | Facility: CLINIC | Age: 70
End: 2025-08-07
Payer: COMMERCIAL

## 2025-08-07 ENCOUNTER — PATIENT OUTREACH (OUTPATIENT)
Dept: CARE COORDINATION | Facility: CLINIC | Age: 70
End: 2025-08-07
Payer: COMMERCIAL

## 2025-08-07 LAB
BKR AP ASSOCIATED HPV REPORT: NORMAL
BKR LAB AP GYN ADEQUACY: NORMAL
BKR LAB AP GYN INTERPRETATION: NORMAL
BKR LAB AP PREVIOUS ABNORMAL: NORMAL
PATH REPORT.COMMENTS IMP SPEC: NORMAL
PATH REPORT.COMMENTS IMP SPEC: NORMAL
PATH REPORT.RELEVANT HX SPEC: NORMAL

## 2025-08-14 ENCOUNTER — ANCILLARY PROCEDURE (OUTPATIENT)
Dept: BONE DENSITY | Facility: CLINIC | Age: 70
End: 2025-08-14
Attending: FAMILY MEDICINE
Payer: COMMERCIAL

## 2025-08-14 DIAGNOSIS — M81.0 AGE-RELATED OSTEOPOROSIS WITHOUT CURRENT PATHOLOGICAL FRACTURE: ICD-10-CM

## 2025-08-14 PROCEDURE — 77081 DXA BONE DENSITY APPENDICULR: CPT | Mod: TC | Performed by: PHYSICIAN ASSISTANT

## 2025-08-14 PROCEDURE — 77080 DXA BONE DENSITY AXIAL: CPT | Mod: TC | Performed by: PHYSICIAN ASSISTANT

## (undated) DEVICE — ENDO TROCAR BLUNT TIP KII BALLOON 12X100MM C0R47

## (undated) DEVICE — SOL ADH LIQUID BENZOIN SWAB 0.6ML C1544

## (undated) DEVICE — DEVICE SUTURE GRASPER TROCAR CLOSURE 14GA PMITCSG

## (undated) DEVICE — SOL NACL 0.9% INJ 1000ML BAG 2B1324X

## (undated) DEVICE — ENDO TROCAR FIRST ENTRY KII FIOS Z-THRD 12X100MM CTF73

## (undated) DEVICE — SPECIMEN TRAP MUCOUS 40ML LUKI C30200A

## (undated) DEVICE — SOL NACL 0.9% IRRIG 1000ML BOTTLE 2F7124

## (undated) DEVICE — BLADE KNIFE SURG 15 371115

## (undated) DEVICE — SUCTION MANIFOLD DORNOCH ULTRA CART UL-CL500

## (undated) DEVICE — KOH COLPOTOMIZER OCCLUDER  CPO-6

## (undated) DEVICE — GLOVE PROTEXIS W/NEU-THERA 6.0  2D73TE60

## (undated) DEVICE — SU VICRYL 0 TIE 54" J608H

## (undated) DEVICE — SUCTION IRR STRYKERFLOW II W/TIP 250-070-520

## (undated) DEVICE — ESU ENDO SCISSORS 5MM CVD 5DCS

## (undated) DEVICE — SOL WATER IRRIG 1000ML BOTTLE 2F7114

## (undated) DEVICE — DRSG PRIMAPORE 02X3" 7133

## (undated) DEVICE — PROTECTOR ARM ONE-STEP TRENDELENBURG 40418

## (undated) DEVICE — PREP TECHNI-CARE CHLOROXYLENOL 3% 4OZ BOTTLE C222-4ZWO

## (undated) DEVICE — PREP CHLORAPREP 26ML TINTED ORANGE  260815

## (undated) DEVICE — LINEN TOWEL PACK X30 5481

## (undated) DEVICE — SU VICRYL 0 UR-6 27" J603H

## (undated) DEVICE — SU DERMABOND ADVANCED .7ML DNX12

## (undated) DEVICE — ESU GROUND PAD ADULT W/CORD E7507

## (undated) DEVICE — NDL BLUNT 18GA 1" W/O FILTER 305181

## (undated) DEVICE — KIT PATIENT POSITIONING PIGAZZI LATEX FREE 40580

## (undated) DEVICE — ENDO TROCAR FIRST ENTRY KII FIOS Z-THRD 05X100MM CTF03

## (undated) DEVICE — SOL WATER IRRIG 1000ML BOTTLE 07139-09

## (undated) DEVICE — Device

## (undated) DEVICE — JELLY LUBRICATING SURGILUBE 2OZ TUBE

## (undated) DEVICE — TUBING IRRIG CYSTO/BLADDER SET 81" LF 2C4040

## (undated) DEVICE — LINEN TOWEL PACK X6 WHITE 5487

## (undated) DEVICE — ENDO TROCAR SLEEVE KII Z-THREADED 05X100MM CTS02

## (undated) DEVICE — SU VICRYL 4-0 PS-2 18" UND J496H

## (undated) DEVICE — GLOVE PROTEXIS BLUE W/NEU-THERA 6.5  2D73EB65

## (undated) DEVICE — TUBING SMOKE EVAC ATTACHMENT E3590

## (undated) DEVICE — WIPES FOLEY CARE SURESTEP PROVON DFC100

## (undated) DEVICE — ESU LIGASURE MARYLAND VESSEL LAP 44CM XLONG LF1944

## (undated) DEVICE — NDL INSUFFLATION 13GA 120MM C2201

## (undated) DEVICE — GOWN LG DISP 9515

## (undated) DEVICE — DRAPE MAYO STAND 23X54 8337

## (undated) DEVICE — ENDO POUCH UNIV RETRIEVAL SYSTEM INZII 10MM CD001

## (undated) RX ORDER — OXYCODONE HYDROCHLORIDE 5 MG/1
TABLET ORAL
Status: DISPENSED
Start: 2018-11-30

## (undated) RX ORDER — HYDROMORPHONE HYDROCHLORIDE 1 MG/ML
INJECTION, SOLUTION INTRAMUSCULAR; INTRAVENOUS; SUBCUTANEOUS
Status: DISPENSED
Start: 2018-11-30

## (undated) RX ORDER — HEPARIN SODIUM 5000 [USP'U]/.5ML
INJECTION, SOLUTION INTRAVENOUS; SUBCUTANEOUS
Status: DISPENSED
Start: 2018-11-30

## (undated) RX ORDER — BUPIVACAINE HYDROCHLORIDE 5 MG/ML
INJECTION, SOLUTION EPIDURAL; INTRACAUDAL
Status: DISPENSED
Start: 2018-11-30

## (undated) RX ORDER — PHENAZOPYRIDINE HYDROCHLORIDE 200 MG/1
TABLET, FILM COATED ORAL
Status: DISPENSED
Start: 2018-11-30

## (undated) RX ORDER — CEFAZOLIN SODIUM 2 G/100ML
INJECTION, SOLUTION INTRAVENOUS
Status: DISPENSED
Start: 2018-11-30

## (undated) RX ORDER — PHENYLEPHRINE HCL IN 0.9% NACL 1 MG/10 ML
SYRINGE (ML) INTRAVENOUS
Status: DISPENSED
Start: 2018-11-30

## (undated) RX ORDER — PROPOFOL 10 MG/ML
INJECTION, EMULSION INTRAVENOUS
Status: DISPENSED
Start: 2018-11-30

## (undated) RX ORDER — FENTANYL CITRATE 50 UG/ML
INJECTION, SOLUTION INTRAMUSCULAR; INTRAVENOUS
Status: DISPENSED
Start: 2018-11-30

## (undated) RX ORDER — EPHEDRINE SULFATE 50 MG/ML
INJECTION, SOLUTION INTRAMUSCULAR; INTRAVENOUS; SUBCUTANEOUS
Status: DISPENSED
Start: 2018-11-30

## (undated) RX ORDER — IPRATROPIUM BROMIDE AND ALBUTEROL SULFATE 2.5; .5 MG/3ML; MG/3ML
SOLUTION RESPIRATORY (INHALATION)
Status: DISPENSED
Start: 2018-11-30

## (undated) RX ORDER — ONDANSETRON 2 MG/ML
INJECTION INTRAMUSCULAR; INTRAVENOUS
Status: DISPENSED
Start: 2018-11-30

## (undated) RX ORDER — LIDOCAINE HYDROCHLORIDE 20 MG/ML
INJECTION, SOLUTION EPIDURAL; INFILTRATION; INTRACAUDAL; PERINEURAL
Status: DISPENSED
Start: 2018-11-30

## (undated) RX ORDER — GLYCOPYRROLATE 0.2 MG/ML
INJECTION, SOLUTION INTRAMUSCULAR; INTRAVENOUS
Status: DISPENSED
Start: 2018-11-30